# Patient Record
Sex: FEMALE | Race: WHITE | Employment: OTHER | URBAN - METROPOLITAN AREA
[De-identification: names, ages, dates, MRNs, and addresses within clinical notes are randomized per-mention and may not be internally consistent; named-entity substitution may affect disease eponyms.]

---

## 2017-01-17 ENCOUNTER — TELEPHONE (OUTPATIENT)
Dept: HEMATOLOGY/ONCOLOGY | Facility: HOSPITAL | Age: 72
End: 2017-01-17

## 2017-01-31 ENCOUNTER — OFFICE VISIT (OUTPATIENT)
Dept: FAMILY MEDICINE CLINIC | Facility: CLINIC | Age: 72
End: 2017-01-31

## 2017-01-31 VITALS — DIASTOLIC BLOOD PRESSURE: 80 MMHG | SYSTOLIC BLOOD PRESSURE: 130 MMHG

## 2017-01-31 DIAGNOSIS — E78.00 HYPERCHOLESTEREMIA: Primary | ICD-10-CM

## 2017-01-31 DIAGNOSIS — I10 ESSENTIAL HYPERTENSION: ICD-10-CM

## 2017-01-31 PROCEDURE — 99213 OFFICE O/P EST LOW 20 MIN: CPT | Performed by: FAMILY MEDICINE

## 2017-01-31 RX ORDER — ATORVASTATIN CALCIUM 20 MG/1
40 TABLET, FILM COATED ORAL NIGHTLY
Qty: 90 TABLET | Refills: 3 | Status: SHIPPED | OUTPATIENT
Start: 2017-01-31 | End: 2017-04-18

## 2017-01-31 NOTE — PROGRESS NOTES
[facility] Office Note  Chief Complaint:   Patient presents with:  Blood Pressure      HPI:   This is a 66year old female coming in for f\u cholesterol and blood pressure      Results for orders placed or performed dur cm.  The cores   are inked black and submitted in toto in cassettes A1 and A2. Jar A2 is    labeled \"left breast without calcifications, 2-3 o'clock\".   Specimen   consists of multiple pieces of yellow to white-tan fibrofatty needle core   biopsies of br BIOPSY STEREOTACTIC NODULE 1 SITE RIGHT  3/2015    Comment benign     Social History:    Smoking Status: Former Smoker                   Packs/Day: 1.00  Years: 21        Quit date: 10/10/2014    Alcohol Use: No              Family History:  Family History conjunctivae clear bilaterally, no eye discharge Ears: External normal. Nose: patent, no nasal discharge Throat:  No tonsillar erythema or exudate. Mouth:  No oral lesions or ulcerations, good dentition. NECK: Supple, no CLAD, no JVD, no thyromegaly.   [facility] Office Note  Chief Complaint:   Patient presents with:  Blood Pressure      HPI:   This is a 66year old female coming in for      Results for orders placed or performed during the hospital encounter of 11/08/16  -CHANDNI submitted in toto in cassettes A1 and A2. Jar A2 is    labeled \"left breast without calcifications, 2-3 o'clock\". Specimen   consists of multiple pieces of yellow to white-tan fibrofatty needle core   biopsies of breast tissue measuring in aggregate 2. 3/2015    Comment benign     Social History:    Smoking Status: Former Smoker                   Packs/Day: 1.00  Years: 21        Quit date: 10/10/2014    Alcohol Use: No              Family History:  Family History   Problem Relation Age of Onset   • Elisabeth Ears: External normal. Nose: patent, no nasal discharge Throat:  No tonsillar erythema or exudate. Mouth:  No oral lesions or ulcerations, good dentition. NECK: Supple, no CLAD, no JVD, no thyromegaly.   SKIN: No rashes, no skin lesion, no bruising, good

## 2017-02-02 ENCOUNTER — TELEPHONE (OUTPATIENT)
Dept: FAMILY MEDICINE CLINIC | Facility: CLINIC | Age: 72
End: 2017-02-02

## 2017-02-02 RX ORDER — ATORVASTATIN CALCIUM 40 MG/1
40 TABLET, FILM COATED ORAL NIGHTLY
Qty: 90 TABLET | Refills: 2 | Status: ON HOLD | OUTPATIENT
Start: 2017-02-02 | End: 2018-01-07

## 2017-02-14 ENCOUNTER — HOSPITAL ENCOUNTER (OUTPATIENT)
Age: 72
Discharge: HOME | End: 2017-02-14
Attending: FAMILY MEDICINE
Payer: MEDICARE

## 2017-02-14 VITALS
WEIGHT: 215 LBS | DIASTOLIC BLOOD PRESSURE: 91 MMHG | BODY MASS INDEX: 32.58 KG/M2 | HEART RATE: 78 BPM | HEIGHT: 68 IN | SYSTOLIC BLOOD PRESSURE: 129 MMHG | OXYGEN SATURATION: 98 % | RESPIRATION RATE: 18 BRPM | TEMPERATURE: 98 F

## 2017-02-14 DIAGNOSIS — J06.9 UPPER RESPIRATORY TRACT INFECTION, UNSPECIFIED TYPE: Primary | ICD-10-CM

## 2017-02-14 PROCEDURE — 99214 OFFICE O/P EST MOD 30 MIN: CPT

## 2017-02-14 PROCEDURE — 99213 OFFICE O/P EST LOW 20 MIN: CPT

## 2017-02-14 RX ORDER — BENZONATATE 200 MG/1
200 CAPSULE ORAL 3 TIMES DAILY PRN
Qty: 30 CAPSULE | Refills: 0 | Status: SHIPPED | OUTPATIENT
Start: 2017-02-14 | End: 2017-02-24

## 2017-02-14 RX ORDER — FLUTICASONE PROPIONATE 50 MCG
SPRAY, SUSPENSION (ML) NASAL
Qty: 1 INHALER | Refills: 0 | Status: SHIPPED | OUTPATIENT
Start: 2017-02-14 | End: 2017-04-18

## 2017-02-14 NOTE — ED PROVIDER NOTES
Patient Seen in: [facility]    History   Patient presents with:  Sinus Problem    Stated Complaint: SINUS ISSUES / Shira NOYOLA    This 13-year-old female presents to the office with complaint of cough which is productive of scarlet CHEMOTHERAPY  2014    LUMPECTOMY LEFT  5/2014    Comment IDC -done at [address] [place] [address]  2014    MARIAM BIOPSY STEREOTACTIC NODULE 1 SITE RIGHT  3/2015    Comment benign       Medications :   Fluticasone Propionate 50 MCG/ACT Nasal Eri HPI.    Physical Exam     ED Triage Vitals   BP 02/14/17 0805 129/91 mmHg   Pulse 02/14/17 0805 78   Resp 02/14/17 0805 18   Temp 02/14/17 0805 98.3 °F (36.8 °C)   Temp src 02/14/17 0805 Temporal   SpO2 02/14/17 0805 98 %   O2 Device 02/14/17 0805 None (Ro Discharge Medication List    START taking these medications    Fluticasone Propionate 50 MCG/ACT Nasal Suspension  Use 2 sprays each nostril once daily after shower. Stop if you develop nose bleeds.   Qty: 1 Inhaler Refills: 0  Associated Diagnoses:Upper re

## 2017-02-14 NOTE — ED INITIAL ASSESSMENT (HPI)
Pt began with a cough 1-2 days ago, she has congestion and has clear mucus from her nose, she also has a headache

## 2017-02-23 ENCOUNTER — TELEPHONE (OUTPATIENT)
Dept: FAMILY MEDICINE CLINIC | Facility: CLINIC | Age: 72
End: 2017-02-23

## 2017-02-27 ENCOUNTER — TELEPHONE (OUTPATIENT)
Dept: HEMATOLOGY/ONCOLOGY | Facility: HOSPITAL | Age: 72
End: 2017-02-27

## 2017-04-18 ENCOUNTER — OFFICE VISIT (OUTPATIENT)
Dept: HEMATOLOGY/ONCOLOGY | Facility: HOSPITAL | Age: 72
End: 2017-04-18
Attending: INTERNAL MEDICINE
Payer: MEDICARE

## 2017-04-18 VITALS
SYSTOLIC BLOOD PRESSURE: 143 MMHG | TEMPERATURE: 99 F | HEART RATE: 94 BPM | OXYGEN SATURATION: 94 % | RESPIRATION RATE: 18 BRPM | DIASTOLIC BLOOD PRESSURE: 82 MMHG

## 2017-04-18 DIAGNOSIS — C50.412 MALIGNANT NEOPLASM OF UPPER-OUTER QUADRANT OF LEFT FEMALE BREAST (HCC): ICD-10-CM

## 2017-04-18 DIAGNOSIS — Z85.3 HISTORY OF BREAST CANCER: Primary | ICD-10-CM

## 2017-04-18 PROCEDURE — 99213 OFFICE O/P EST LOW 20 MIN: CPT | Performed by: INTERNAL MEDICINE

## 2017-04-18 NOTE — PROGRESS NOTES
Pt here for 6 month MD f/u for hx of LB ca. Continues on Tamoxifen daily- c/o ongoing hot flashes that are manageable. Pt has no further complaints at this time.      Education Record    Learner:  Patient    Disease / Diagnosis:    Barriers / Limitations:

## 2017-05-02 NOTE — PROGRESS NOTES
[facility]    PATIENT'S NAME: Tomi [place]   ATTENDING PHYSICIAN: Justina Kiser M.D.    PATIENT ACCOUNT #: [de-identified] LOCATION: [address] [place] [address] RECORD #: [id number] YOB: 1945   DATE OF SERVICE: 04/18/2017       CAN GENERAL:  She is a well-developed, obese female in no acute distress. VITAL SIGNS:  Her performance status is 0. Her weight was not measured. Blood pressure 143/82, pulse 94, respiratory rate 20, temperature 98. 6. HEENT:  Unremarkable.   She has pink

## 2017-08-11 ENCOUNTER — TELEPHONE (OUTPATIENT)
Dept: FAMILY MEDICINE CLINIC | Facility: CLINIC | Age: 72
End: 2017-08-11

## 2017-08-11 NOTE — TELEPHONE ENCOUNTER
Patient states that she stopped her Isosorbide mononitrate due to extreme fatigue and sob, she has not had it in 5 days and feels a lot better. I advised her that I would let Dr. Yg Dunbar know and if he wanted to something different I would contact her.  She s

## 2017-09-25 RX ORDER — METOPROLOL SUCCINATE 50 MG/1
TABLET, EXTENDED RELEASE ORAL
Qty: 30 TABLET | Refills: 0 | Status: SHIPPED | OUTPATIENT
Start: 2017-09-25 | End: 2017-10-27

## 2017-09-25 RX ORDER — METOPROLOL SUCCINATE 50 MG/1
TABLET, EXTENDED RELEASE ORAL
Qty: 30 TABLET | Refills: 0 | Status: SHIPPED | OUTPATIENT
Start: 2017-09-25 | End: 2017-09-25

## 2017-09-25 RX ORDER — DULOXETIN HYDROCHLORIDE 60 MG/1
CAPSULE, DELAYED RELEASE ORAL
Qty: 30 CAPSULE | Refills: 0 | Status: SHIPPED | OUTPATIENT
Start: 2017-09-25 | End: 2017-09-25

## 2017-09-25 RX ORDER — ISOSORBIDE MONONITRATE 30 MG/1
TABLET, EXTENDED RELEASE ORAL
Qty: 30 TABLET | Refills: 0 | Status: SHIPPED | OUTPATIENT
Start: 2017-09-25 | End: 2017-09-25

## 2017-09-25 RX ORDER — ISOSORBIDE MONONITRATE 30 MG/1
TABLET, EXTENDED RELEASE ORAL
Qty: 30 TABLET | Refills: 0 | Status: SHIPPED | OUTPATIENT
Start: 2017-09-25 | End: 2017-11-10

## 2017-09-25 RX ORDER — DULOXETIN HYDROCHLORIDE 60 MG/1
CAPSULE, DELAYED RELEASE ORAL
Qty: 30 CAPSULE | Refills: 0 | Status: SHIPPED | OUTPATIENT
Start: 2017-09-25 | End: 2017-10-27

## 2017-10-03 ENCOUNTER — TELEPHONE (OUTPATIENT)
Dept: HEMATOLOGY/ONCOLOGY | Facility: HOSPITAL | Age: 72
End: 2017-10-03

## 2017-10-13 RX ORDER — CELECOXIB 200 MG/1
200 CAPSULE ORAL DAILY
Qty: 30 CAPSULE | Refills: 5 | Status: ON HOLD | OUTPATIENT
Start: 2017-10-13 | End: 2018-01-07

## 2017-10-27 ENCOUNTER — TELEPHONE (OUTPATIENT)
Dept: FAMILY MEDICINE CLINIC | Facility: CLINIC | Age: 72
End: 2017-10-27

## 2017-10-27 RX ORDER — DULOXETIN HYDROCHLORIDE 60 MG/1
CAPSULE, DELAYED RELEASE ORAL
Qty: 90 CAPSULE | Refills: 0 | OUTPATIENT
Start: 2017-10-27

## 2017-10-27 RX ORDER — METOPROLOL SUCCINATE 50 MG/1
TABLET, EXTENDED RELEASE ORAL
Qty: 90 TABLET | Refills: 0 | OUTPATIENT
Start: 2017-10-27

## 2017-10-27 RX ORDER — DULOXETIN HYDROCHLORIDE 60 MG/1
CAPSULE, DELAYED RELEASE ORAL
Qty: 30 CAPSULE | Refills: 0 | Status: SHIPPED | OUTPATIENT
Start: 2017-10-27 | End: 2017-11-17

## 2017-10-27 RX ORDER — METOPROLOL SUCCINATE 50 MG/1
TABLET, EXTENDED RELEASE ORAL
Qty: 30 TABLET | Refills: 0 | Status: SHIPPED | OUTPATIENT
Start: 2017-10-27 | End: 2017-11-10 | Stop reason: ALTCHOICE

## 2017-10-27 NOTE — TELEPHONE ENCOUNTER
Patient is calling to get a refill on two of her medications. She has an appointment scheduled for 11/10.  She needs her metroprolol and duloxetine refill until her appointment time

## 2017-10-30 RX ORDER — DULOXETIN HYDROCHLORIDE 60 MG/1
CAPSULE, DELAYED RELEASE ORAL
Qty: 30 CAPSULE | Refills: 0 | Status: SHIPPED | OUTPATIENT
Start: 2017-10-30 | End: 2018-01-05

## 2017-11-09 ENCOUNTER — HOSPITAL ENCOUNTER (OUTPATIENT)
Dept: MAMMOGRAPHY | Facility: HOSPITAL | Age: 72
Discharge: HOME | End: 2017-11-09
Attending: INTERNAL MEDICINE
Payer: MEDICARE

## 2017-11-09 DIAGNOSIS — Z85.3 HISTORY OF BREAST CANCER: ICD-10-CM

## 2017-11-09 PROCEDURE — 77066 DX MAMMO INCL CAD BI: CPT | Performed by: INTERNAL MEDICINE

## 2017-11-09 PROCEDURE — 77062 BREAST TOMOSYNTHESIS BI: CPT | Performed by: INTERNAL MEDICINE

## 2017-11-10 ENCOUNTER — OFFICE VISIT (OUTPATIENT)
Dept: FAMILY MEDICINE CLINIC | Facility: CLINIC | Age: 72
End: 2017-11-10

## 2017-11-10 VITALS
HEART RATE: 88 BPM | SYSTOLIC BLOOD PRESSURE: 150 MMHG | OXYGEN SATURATION: 94 % | BODY MASS INDEX: 39.87 KG/M2 | HEIGHT: 67.5 IN | DIASTOLIC BLOOD PRESSURE: 100 MMHG | WEIGHT: 257 LBS

## 2017-11-10 DIAGNOSIS — I10 ESSENTIAL HYPERTENSION: Primary | ICD-10-CM

## 2017-11-10 DIAGNOSIS — E66.09 OBESITY DUE TO EXCESS CALORIES, UNSPECIFIED CLASSIFICATION, UNSPECIFIED WHETHER SERIOUS COMORBIDITY PRESENT: ICD-10-CM

## 2017-11-10 DIAGNOSIS — Z00.00 ENCOUNTER FOR MEDICARE ANNUAL WELLNESS EXAM: ICD-10-CM

## 2017-11-10 DIAGNOSIS — C50.919 MALIGNANT NEOPLASM OF FEMALE BREAST, UNSPECIFIED ESTROGEN RECEPTOR STATUS, UNSPECIFIED LATERALITY, UNSPECIFIED SITE OF BREAST (HCC): ICD-10-CM

## 2017-11-10 DIAGNOSIS — Z13.6 SCREENING FOR CARDIOVASCULAR CONDITION: ICD-10-CM

## 2017-11-10 DIAGNOSIS — E78.00 HYPERCHOLESTEREMIA: ICD-10-CM

## 2017-11-10 DIAGNOSIS — F41.9 ANXIETY: ICD-10-CM

## 2017-11-10 PROCEDURE — G0439 PPPS, SUBSEQ VISIT: HCPCS | Performed by: FAMILY MEDICINE

## 2017-11-10 RX ORDER — ALPRAZOLAM 0.25 MG/1
0.25 TABLET ORAL 3 TIMES DAILY PRN
Qty: 60 TABLET | Refills: 1 | Status: SHIPPED | OUTPATIENT
Start: 2017-11-10 | End: 2019-04-05 | Stop reason: ALTCHOICE

## 2017-11-10 RX ORDER — AMLODIPINE BESYLATE AND BENAZEPRIL HYDROCHLORIDE 5; 20 MG/1; MG/1
1 CAPSULE ORAL DAILY
Qty: 90 CAPSULE | Refills: 3 | Status: SHIPPED | OUTPATIENT
Start: 2017-11-10 | End: 2017-11-17

## 2017-11-10 NOTE — PROGRESS NOTES
[facility] Office Note  Chief Complaint:   Patient presents with:   Well Adult  HTN  Shortness Of Breath      HPI:   This is a 74year old female coming in for well exam.      Results for orders placed or performed imelda cm.  The cores   are inked black and submitted in toto in cassettes A1 and A2. Jar A2 is    labeled \"left breast without calcifications, 2-3 o'clock\".   Specimen   consists of multiple pieces of yellow to white-tan fibrofatty needle core   biopsies of br Comment: Procedure: ESOPHAGOGASTRODUODENOSCOPY,                COLONOSCOPY, POSSIBLE BIOPSY, POSSIBLE                POLYPECTOMY [id number];  Surgeon:  Maggy Flores MD;  Location: [facility] throat. INTEGUMENTARY:  Denies rashes, itching, skin lesion, or excessive skin dryness.   CARDIOVASCULAR:  Denies chest pain, chest pressure, chest discomfort, palpitations, edema, dyspnea on exertion or at rest.  RESPIRATORY:  Denies shortness of breath, tenderness. ABDOMEN:  Soft, nondistended, nontender, bowel sounds normal in all 4 quadrants, no masses, no hepatosplenomegaly. BACK: No tenderness, no spasm, SLR test negative, FROM.   EXTREMITIES:  No edema, no cyanosis, no clubbing, FROM, 2+ dorsalis pe to call with any questions, complications, allergies, or worsening or changing symptoms. Patient is to call with any side effects or complications from the treatments as a result of today.      Problem List:  Patient Active Problem List:     Breast cancer needed for Sleep.    DULOXETINE HCL 60 MG Oral Cap DR Particles TAKE 1 CAPSULE(60 MG) BY MOUTH EVERY DAY   DULoxetine HCl 60 MG Oral Cap DR Particles TAKE 1 CAPSULE(60 MG) BY MOUTH EVERY DAY   celecoxib 200 MG Oral Cap Take 1 capsule (200 mg total) by mouth denies heartburn  : denies dysuria, vaginal discharge or itching, no complaint of urinary incontinence   MUSCULOSKELETAL: denies back pain  NEURO: denies headaches  PSYCHE: denies depression or anxiety  HEMATOLOGIC: denies hx of anemia  ENDOCRINE: denies Skin: Skin color, texture, turgor normal, no rashes or lesions   Lymph nodes: Cervical, supraclavicular, and axillary nodes normal   Neurologic: Normal         SUGGESTED VACCINATIONS - Influenza, Pneumococcal, Zoster, Tetanus     There is no immunization activity?: Light    How would you describe your current health state?: Good    How do you maintain positive mental well-being?: Social Interaction;Puzzles;Games; Visiting Friends; Visiting Family    If you are a male age 38-65 or a female age 47-67, do you t patient  PREVENTATIVE SERVICES  INDICATIONS AND SCHEDULE Internal Lab or Procedure External Lab or Procedure   Diabetes Screening      HbgA1C   Annually   Lab Results  Component Value Date   A1C 5.3 09/09/2016    No flowsheet data found.     Fasting Blood S Pneumococcal 13 (Prevnar)  Covered Once after 65 No vaccine history found Please get once after your 65th birthday    Pneumococcal 23 (Pneumovax)  Covered Once after 65 No vaccine history found Please get once after your 65th birthday    Hepatitis B for (L) (%)   Date Value   09/09/2016 5.3    No flowsheet data found.     Creat/alb ratio  Annually      LDL  Annually LDL Cholesterol Calc (mg/dL)   Date Value   07/18/2006 67     LDL Cholesterol (mg/dL)   Date Value   09/09/2016 146 (H)    No flowsheet data f

## 2017-11-13 NOTE — PATIENT INSTRUCTIONS
[address] SCREENING SCHEDULE   Tests on this list are recommended by your physician but may not be covered, or covered at this frequency, by your insurer. Please check with your insurance carrier before scheduling to verify coverage.    [place] for this or any previous visit.  Limited to patients who meet one of the following criteria:   • Men who are 73-68 years old and have smoked more than 100 cigarettes in their lifetime   • Anyone with a family history    Colorectal Cancer Screening  Covered regularly   Immunizations      Influenza  Covered Annually No orders found for this or any previous visit. Please get every year    Pneumococcal 13 (Prevnar)  Covered Once after 65 No orders found for this or any previous visit.  Please get once after your

## 2017-11-17 ENCOUNTER — OFFICE VISIT (OUTPATIENT)
Dept: HEMATOLOGY/ONCOLOGY | Facility: HOSPITAL | Age: 72
End: 2017-11-17
Attending: INTERNAL MEDICINE
Payer: MEDICARE

## 2017-11-17 VITALS
HEIGHT: 66.54 IN | TEMPERATURE: 97 F | RESPIRATION RATE: 18 BRPM | SYSTOLIC BLOOD PRESSURE: 132 MMHG | HEART RATE: 68 BPM | DIASTOLIC BLOOD PRESSURE: 85 MMHG | OXYGEN SATURATION: 100 %

## 2017-11-17 DIAGNOSIS — Z17.0 MALIGNANT NEOPLASM OF UPPER-OUTER QUADRANT OF LEFT BREAST IN FEMALE, ESTROGEN RECEPTOR POSITIVE (HCC): Primary | ICD-10-CM

## 2017-11-17 DIAGNOSIS — C50.412 MALIGNANT NEOPLASM OF UPPER-OUTER QUADRANT OF LEFT BREAST IN FEMALE, ESTROGEN RECEPTOR POSITIVE (HCC): Primary | ICD-10-CM

## 2017-11-17 PROCEDURE — 99213 OFFICE O/P EST LOW 20 MIN: CPT | Performed by: INTERNAL MEDICINE

## 2017-11-17 RX ORDER — AMLODIPINE BESYLATE 5 MG/1
5 TABLET ORAL DAILY
Refills: 3 | COMMUNITY
Start: 2017-11-10 | End: 2018-05-10

## 2017-11-17 RX ORDER — BENAZEPRIL HYDROCHLORIDE 20 MG/1
20 TABLET ORAL DAILY
Refills: 3 | COMMUNITY
Start: 2017-11-10 | End: 2018-05-10

## 2017-11-17 NOTE — PROGRESS NOTES
Patient is here for MD f/u for breast cancer. On Tamoxifen. Reports occasional hot flashes and night sweats at night. Appetite and energy level is good. Mild dizziness this week d/t blood pressure med changes. Pt had a mammogram one week ago.            Edu

## 2017-11-20 NOTE — PROGRESS NOTES
[facility]    PATIENT'S NAME: Reinaldo Swanson   ATTENDING PHYSICIAN: Elsa Johnson M.D.    PATIENT ACCOUNT #: [de-identified] LOCATION: [address] [place] [address] RECORD #: [id number] YOB: 1945   DATE OF SERVICE: 11/17/2017       CAN conjunctivae, anicteric sclerae. Pharynx without lesions. LYMPHATICS:  She has no cervical, supraclavicular, or axillary adenopathy. LUNGS:  Resonant to percussion and clear to auscultation with no wheezing, rales, or rhonchi.   HEART:  Regular S1 and S2

## 2017-12-11 ENCOUNTER — TELEPHONE (OUTPATIENT)
Dept: FAMILY MEDICINE CLINIC | Facility: CLINIC | Age: 72
End: 2017-12-11

## 2017-12-11 DIAGNOSIS — R06.02 SHORTNESS OF BREATH: Primary | ICD-10-CM

## 2017-12-11 NOTE — TELEPHONE ENCOUNTER
P/Dr. Gilles Corrales order a chest xray and PFT's. Orders are in the system and she can call and schedule for when she can.

## 2017-12-12 ENCOUNTER — TELEPHONE (OUTPATIENT)
Dept: FAMILY MEDICINE CLINIC | Facility: CLINIC | Age: 72
End: 2017-12-12

## 2017-12-12 ENCOUNTER — HOSPITAL ENCOUNTER (OUTPATIENT)
Dept: GENERAL RADIOLOGY | Facility: HOSPITAL | Age: 72
Discharge: HOME | End: 2017-12-12
Attending: FAMILY MEDICINE
Payer: MEDICARE

## 2017-12-12 DIAGNOSIS — R06.02 SHORTNESS OF BREATH: ICD-10-CM

## 2017-12-12 PROCEDURE — 71020 XR CHEST PA + LAT CHEST (CPT=71020): CPT | Performed by: FAMILY MEDICINE

## 2017-12-13 ENCOUNTER — NURSE ONLY (OUTPATIENT)
Dept: RESPIRATORY THERAPY | Facility: HOSPITAL | Age: 72
End: 2017-12-13
Attending: FAMILY MEDICINE
Payer: MEDICARE

## 2017-12-13 DIAGNOSIS — R06.02 SHORTNESS OF BREATH: ICD-10-CM

## 2017-12-13 PROCEDURE — 94010 BREATHING CAPACITY TEST: CPT

## 2017-12-13 PROCEDURE — 94726 PLETHYSMOGRAPHY LUNG VOLUMES: CPT

## 2017-12-13 PROCEDURE — 94729 DIFFUSING CAPACITY: CPT

## 2017-12-13 NOTE — TELEPHONE ENCOUNTER
Advised patient that there is a hiatal hernia, she states that she was aware of the that and has no issues with it. Will be having PFT test tomorrow.

## 2017-12-13 NOTE — PROCEDURES
Spirometry and flow volume loop appear normal with no evidence of airway obstruction     Spirometry and flow volume loop suggest restriction but the total lung capacity is within normal limits.   Normal TLC, no evidence of restriction    The diffusing capac

## 2017-12-18 ENCOUNTER — TELEPHONE (OUTPATIENT)
Dept: FAMILY MEDICINE CLINIC | Facility: CLINIC | Age: 72
End: 2017-12-18

## 2017-12-18 DIAGNOSIS — R06.02 SHORTNESS OF BREATH: Primary | ICD-10-CM

## 2017-12-18 NOTE — TELEPHONE ENCOUNTER
Patient informed that she needed to see a pulmonologist, she will look for one at go and will go get bw done as well.

## 2018-01-02 ENCOUNTER — TELEPHONE (OUTPATIENT)
Dept: FAMILY MEDICINE CLINIC | Facility: CLINIC | Age: 73
End: 2018-01-02

## 2018-01-02 ENCOUNTER — LAB ENCOUNTER (OUTPATIENT)
Dept: LAB | Facility: HOSPITAL | Age: 73
End: 2018-01-02
Attending: FAMILY MEDICINE
Payer: MEDICARE

## 2018-01-02 DIAGNOSIS — I10 ESSENTIAL HYPERTENSION: ICD-10-CM

## 2018-01-02 DIAGNOSIS — E78.00 HYPERCHOLESTEREMIA: ICD-10-CM

## 2018-01-02 LAB
ALBUMIN SERPL-MCNC: 3.2 G/DL (ref 3.5–4.8)
ALP LIVER SERPL-CCNC: 64 U/L (ref 55–142)
ALT SERPL-CCNC: 16 U/L (ref 14–54)
AST SERPL-CCNC: 16 U/L (ref 15–41)
BASOPHILS # BLD AUTO: 0.13 X10(3) UL (ref 0–0.1)
BASOPHILS NFR BLD AUTO: 1.2 %
BILIRUB SERPL-MCNC: 0.3 MG/DL (ref 0.1–2)
BUN BLD-MCNC: 17 MG/DL (ref 8–20)
CALCIUM BLD-MCNC: 8.9 MG/DL (ref 8.3–10.3)
CHLORIDE: 110 MMOL/L (ref 101–111)
CHOLEST SMN-MCNC: 243 MG/DL (ref ?–200)
CO2: 21 MMOL/L (ref 22–32)
CREAT BLD-MCNC: 1.05 MG/DL (ref 0.55–1.02)
EOSINOPHIL # BLD AUTO: 0.46 X10(3) UL (ref 0–0.3)
EOSINOPHIL NFR BLD AUTO: 4.3 %
ERYTHROCYTE [DISTWIDTH] IN BLOOD BY AUTOMATED COUNT: 16.1 % (ref 11.5–16)
GLUCOSE BLD-MCNC: 128 MG/DL (ref 70–99)
HCT VFR BLD AUTO: 30.2 % (ref 34–50)
HDLC SERPL-MCNC: 48 MG/DL (ref 45–?)
HDLC SERPL: 5.06 {RATIO} (ref ?–4.44)
HGB BLD-MCNC: 9 G/DL (ref 12–16)
IMMATURE GRANULOCYTE COUNT: 0.03 X10(3) UL (ref 0–1)
IMMATURE GRANULOCYTE RATIO %: 0.3 %
LDLC SERPL CALC-MCNC: 134 MG/DL (ref ?–130)
LYMPHOCYTES # BLD AUTO: 3.71 X10(3) UL (ref 0.9–4)
LYMPHOCYTES NFR BLD AUTO: 34.8 %
M PROTEIN MFR SERPL ELPH: 7.1 G/DL (ref 6.1–8.3)
MCH RBC QN AUTO: 24.6 PG (ref 27–33.2)
MCHC RBC AUTO-ENTMCNC: 29.8 G/DL (ref 31–37)
MCV RBC AUTO: 82.5 FL (ref 81–100)
MONOCYTES # BLD AUTO: 0.74 X10(3) UL (ref 0.1–0.6)
MONOCYTES NFR BLD AUTO: 6.9 %
NEUTROPHIL ABS PRELIM: 5.58 X10 (3) UL (ref 1.3–6.7)
NEUTROPHILS # BLD AUTO: 5.58 X10(3) UL (ref 1.3–6.7)
NEUTROPHILS NFR BLD AUTO: 52.5 %
NONHDLC SERPL-MCNC: 195 MG/DL (ref ?–130)
PLATELET # BLD AUTO: 385 10(3)UL (ref 150–450)
POTASSIUM SERPL-SCNC: 3.8 MMOL/L (ref 3.6–5.1)
RBC # BLD AUTO: 3.66 X10(6)UL (ref 3.8–5.1)
RED CELL DISTRIBUTION WIDTH-SD: 48.7 FL (ref 35.1–46.3)
SODIUM SERPL-SCNC: 142 MMOL/L (ref 136–144)
TRIGL SERPL-MCNC: 304 MG/DL (ref ?–150)
VLDLC SERPL CALC-MCNC: 61 MG/DL (ref 5–40)
WBC # BLD AUTO: 10.7 X10(3) UL (ref 4–13)

## 2018-01-02 PROCEDURE — 80053 COMPREHEN METABOLIC PANEL: CPT

## 2018-01-02 PROCEDURE — 36415 COLL VENOUS BLD VENIPUNCTURE: CPT

## 2018-01-02 PROCEDURE — 85025 COMPLETE CBC W/AUTO DIFF WBC: CPT

## 2018-01-02 PROCEDURE — 80061 LIPID PANEL: CPT

## 2018-01-05 ENCOUNTER — TELEPHONE (OUTPATIENT)
Dept: FAMILY MEDICINE CLINIC | Facility: CLINIC | Age: 73
End: 2018-01-05

## 2018-01-05 RX ORDER — DULOXETIN HYDROCHLORIDE 60 MG/1
CAPSULE, DELAYED RELEASE ORAL
Qty: 30 CAPSULE | Refills: 0 | Status: SHIPPED | OUTPATIENT
Start: 2018-01-05 | End: 2018-01-18

## 2018-01-05 RX ORDER — TAMOXIFEN CITRATE 20 MG/1
TABLET ORAL
Qty: 90 TABLET | Refills: 0 | OUTPATIENT
Start: 2018-01-05

## 2018-01-05 RX ORDER — TAMOXIFEN CITRATE 20 MG/1
TABLET ORAL
Qty: 30 TABLET | Refills: 0 | Status: SHIPPED | OUTPATIENT
Start: 2018-01-05 | End: 2018-02-02

## 2018-01-06 NOTE — TELEPHONE ENCOUNTER
Per Dr. Arti Mobley, to have patient come in and discuss results. Patient would only like to go to [address] [place] [address] an appointment for Friday the 12th, and patient declined, states she doesn't want to wait that long.   She would like to be seen on Tuesday [facility]

## 2018-01-07 ENCOUNTER — APPOINTMENT (OUTPATIENT)
Dept: GENERAL RADIOLOGY | Facility: HOSPITAL | Age: 73
DRG: 378 | End: 2018-01-07
Attending: EMERGENCY MEDICINE
Payer: MEDICARE

## 2018-01-07 ENCOUNTER — HOSPITAL ENCOUNTER (INPATIENT)
Facility: HOSPITAL | Age: 73
LOS: 3 days | Discharge: HOME | DRG: 378 | End: 2018-01-10
Attending: EMERGENCY MEDICINE | Admitting: INTERNAL MEDICINE
Payer: MEDICARE

## 2018-01-07 DIAGNOSIS — D50.8 IRON DEFICIENCY ANEMIA SECONDARY TO INADEQUATE DIETARY IRON INTAKE: ICD-10-CM

## 2018-01-07 DIAGNOSIS — D64.9 SYMPTOMATIC ANEMIA: ICD-10-CM

## 2018-01-07 DIAGNOSIS — K92.2 GASTROINTESTINAL HEMORRHAGE, UNSPECIFIED GASTROINTESTINAL HEMORRHAGE TYPE: Primary | ICD-10-CM

## 2018-01-07 DIAGNOSIS — R06.00 DOE (DYSPNEA ON EXERTION): ICD-10-CM

## 2018-01-07 PROBLEM — R06.09 DOE (DYSPNEA ON EXERTION): Status: ACTIVE | Noted: 2018-01-07

## 2018-01-07 LAB
ALBUMIN SERPL-MCNC: 3.2 G/DL (ref 3.5–4.8)
ALP LIVER SERPL-CCNC: 66 U/L (ref 55–142)
ALT SERPL-CCNC: 17 U/L (ref 14–54)
ANTIBODY SCREEN: NEGATIVE
APTT PPP: 27.5 SECONDS (ref 25–34)
AST SERPL-CCNC: 15 U/L (ref 15–41)
BASOPHILS # BLD AUTO: 0.04 X10(3) UL (ref 0–0.1)
BASOPHILS NFR BLD AUTO: 0.5 %
BILIRUB SERPL-MCNC: 0.3 MG/DL (ref 0.1–2)
BUN BLD-MCNC: 22 MG/DL (ref 8–20)
CALCIUM BLD-MCNC: 8.8 MG/DL (ref 8.3–10.3)
CHLORIDE: 108 MMOL/L (ref 101–111)
CO2: 24 MMOL/L (ref 22–32)
CREAT BLD-MCNC: 1.04 MG/DL (ref 0.55–1.02)
D-DIMER: 0.4 UG/ML FEU (ref 0–0.49)
DEPRECATED HBV CORE AB SER IA-ACNC: 5.9 NG/ML (ref 10–291)
EOSINOPHIL # BLD AUTO: 0.19 X10(3) UL (ref 0–0.3)
EOSINOPHIL NFR BLD AUTO: 2.2 %
ERYTHROCYTE [DISTWIDTH] IN BLOOD BY AUTOMATED COUNT: 16 % (ref 11.5–16)
GLUCOSE BLD-MCNC: 97 MG/DL (ref 70–99)
HCT VFR BLD AUTO: 28.2 % (ref 34–50)
HGB BLD-MCNC: 8.4 G/DL (ref 12–16)
IMMATURE GRANULOCYTE COUNT: 0.04 X10(3) UL (ref 0–1)
IMMATURE GRANULOCYTE RATIO %: 0.5 %
INR BLD: 1.36 (ref 0.89–1.11)
IRON SATURATION: 3 % (ref 13–45)
IRON: 16 UG/DL (ref 28–170)
LYMPHOCYTES # BLD AUTO: 1.4 X10(3) UL (ref 0.9–4)
LYMPHOCYTES NFR BLD AUTO: 15.9 %
M PROTEIN MFR SERPL ELPH: 6.9 G/DL (ref 6.1–8.3)
MCH RBC QN AUTO: 24.1 PG (ref 27–33.2)
MCHC RBC AUTO-ENTMCNC: 29.8 G/DL (ref 31–37)
MCV RBC AUTO: 81 FL (ref 81–100)
MONOCYTES # BLD AUTO: 0.73 X10(3) UL (ref 0.1–0.6)
MONOCYTES NFR BLD AUTO: 8.3 %
NEUTROPHIL ABS PRELIM: 6.41 X10 (3) UL (ref 1.3–6.7)
NEUTROPHILS # BLD AUTO: 6.41 X10(3) UL (ref 1.3–6.7)
NEUTROPHILS NFR BLD AUTO: 72.6 %
PLATELET # BLD AUTO: 291 10(3)UL (ref 150–450)
POTASSIUM SERPL-SCNC: 4.4 MMOL/L (ref 3.6–5.1)
PRO-BETA NATRIURETIC PEPTIDE: 39 PG/ML (ref ?–125)
PSA SERPL DL<=0.01 NG/ML-MCNC: 16.9 SECONDS (ref 12–14.3)
RBC # BLD AUTO: 3.48 X10(6)UL (ref 3.8–5.1)
RED CELL DISTRIBUTION WIDTH-SD: 47.2 FL (ref 35.1–46.3)
RH BLOOD TYPE: NEGATIVE
SODIUM SERPL-SCNC: 141 MMOL/L (ref 136–144)
TOTAL IRON BINDING CAPACITY: 536 UG/DL (ref 298–536)
TRANSFERRIN: 360 MG/DL (ref 200–360)
TROPONIN: <0.046 NG/ML (ref ?–0.05)
WBC # BLD AUTO: 8.8 X10(3) UL (ref 4–13)

## 2018-01-07 PROCEDURE — 99223 1ST HOSP IP/OBS HIGH 75: CPT | Performed by: INTERNAL MEDICINE

## 2018-01-07 PROCEDURE — 71045 X-RAY EXAM CHEST 1 VIEW: CPT | Performed by: EMERGENCY MEDICINE

## 2018-01-07 RX ORDER — ONDANSETRON 2 MG/ML
4 INJECTION INTRAMUSCULAR; INTRAVENOUS EVERY 6 HOURS PRN
Status: DISCONTINUED | OUTPATIENT
Start: 2018-01-07 | End: 2018-01-10

## 2018-01-07 RX ORDER — TAMOXIFEN CITRATE 10 MG/1
20 TABLET ORAL DAILY
Status: DISCONTINUED | OUTPATIENT
Start: 2018-01-08 | End: 2018-01-10

## 2018-01-07 RX ORDER — AMLODIPINE BESYLATE 5 MG/1
5 TABLET ORAL DAILY
Status: DISCONTINUED | OUTPATIENT
Start: 2018-01-07 | End: 2018-01-08

## 2018-01-07 RX ORDER — LISINOPRIL 20 MG/1
20 TABLET ORAL DAILY
Status: DISCONTINUED | OUTPATIENT
Start: 2018-01-07 | End: 2018-01-10

## 2018-01-07 RX ORDER — DULOXETIN HYDROCHLORIDE 30 MG/1
60 CAPSULE, DELAYED RELEASE ORAL DAILY
Status: DISCONTINUED | OUTPATIENT
Start: 2018-01-07 | End: 2018-01-10

## 2018-01-07 RX ORDER — ALPRAZOLAM 0.25 MG/1
0.25 TABLET ORAL 3 TIMES DAILY PRN
Status: DISCONTINUED | OUTPATIENT
Start: 2018-01-07 | End: 2018-01-10

## 2018-01-07 RX ORDER — TAMOXIFEN CITRATE 10 MG/1
20 TABLET ORAL DAILY
Status: DISCONTINUED | OUTPATIENT
Start: 2018-01-07 | End: 2018-01-07 | Stop reason: SDUPTHER

## 2018-01-07 NOTE — ED PROVIDER NOTES
Patient Seen in: [facility] Emergency Department    History   Patient presents with:  Dyspnea MILA SOB (respiratory)    Stated Complaint: dyspnea on exertion -- chronic.   Worsening today    HPI    70-year-old female with history of breast cancer, ane ESOPHAGOGASTRODUODENOSCOPY,                COLONOSCOPY, POSSIBLE BIOPSY, POSSIBLE                POLYPECTOMY [id number];  Surgeon:  Bret Santiago MD;  Location: [address] [place] [address]  12/11/2014: PATIENT Ciara Etienne Trachea midline. No cervical lymphadenopathy. HEART: Regular rate and rhythm, no murmurs. LUNGS: Clear to auscultation bilaterally. No Rales, no rhonchi, no wheezing, no stridor.   ABDOMEN: Soft, nondistended,non tender, bowel sounds are present, no linda ---------                               -----------         ------                     CBC W/ DIFFERENTIAL[[id number]]          Abnormal            Final result                 Please view results for these tests on the individual orders.    URINALYSIS WIT Medication List        Present on Admission  Date Reviewed: 11/19/2017          ICD-10-CM Noted POA    Gastrointestinal hemorrhage K92.2 1/7/2018 Unknown

## 2018-01-07 NOTE — ED NOTES
Rectal exam done by MD at this time accompanied by RN. Pt with + microscopic blood noted per hemoccult. Pt and daughter updated on plan of care.

## 2018-01-07 NOTE — ED INITIAL ASSESSMENT (HPI)
Pt presents to the ED accompanied by family with complaints of worsening dyspnea on exertion over past few weeks. Pt states she is short of breath with only walking short distances.  Pt states she has seen her doctor for this shortness of breath and has had

## 2018-01-08 ENCOUNTER — APPOINTMENT (OUTPATIENT)
Dept: CV DIAGNOSTICS | Facility: HOSPITAL | Age: 73
DRG: 378 | End: 2018-01-08
Attending: INTERNAL MEDICINE
Payer: MEDICARE

## 2018-01-08 ENCOUNTER — SURGERY (OUTPATIENT)
Age: 73
End: 2018-01-08

## 2018-01-08 LAB
ATRIAL RATE: 100 BPM
BASOPHILS # BLD AUTO: 0.06 X10(3) UL (ref 0–0.1)
BASOPHILS NFR BLD AUTO: 0.7 %
BUN BLD-MCNC: 20 MG/DL (ref 8–20)
CALCIUM BLD-MCNC: 8.9 MG/DL (ref 8.3–10.3)
CHLORIDE: 109 MMOL/L (ref 101–111)
CO2: 25 MMOL/L (ref 22–32)
CREAT BLD-MCNC: 1.07 MG/DL (ref 0.55–1.02)
EOSINOPHIL # BLD AUTO: 0.24 X10(3) UL (ref 0–0.3)
EOSINOPHIL NFR BLD AUTO: 2.8 %
ERYTHROCYTE [DISTWIDTH] IN BLOOD BY AUTOMATED COUNT: 16.2 % (ref 11.5–16)
GLUCOSE BLD-MCNC: 105 MG/DL (ref 70–99)
HAV IGM SER QL: 2.4 MG/DL (ref 1.7–3)
HCT VFR BLD AUTO: 24.4 % (ref 34–50)
HGB BLD-MCNC: 7.3 G/DL (ref 12–16)
IMMATURE GRANULOCYTE COUNT: 0.04 X10(3) UL (ref 0–1)
IMMATURE GRANULOCYTE RATIO %: 0.5 %
LYMPHOCYTES # BLD AUTO: 1.97 X10(3) UL (ref 0.9–4)
LYMPHOCYTES NFR BLD AUTO: 23 %
MCH RBC QN AUTO: 23.9 PG (ref 27–33.2)
MCHC RBC AUTO-ENTMCNC: 29.9 G/DL (ref 31–37)
MCV RBC AUTO: 79.7 FL (ref 81–100)
MONOCYTES # BLD AUTO: 0.74 X10(3) UL (ref 0.1–0.6)
MONOCYTES NFR BLD AUTO: 8.6 %
NEUTROPHIL ABS PRELIM: 5.52 X10 (3) UL (ref 1.3–6.7)
NEUTROPHILS # BLD AUTO: 5.52 X10(3) UL (ref 1.3–6.7)
NEUTROPHILS NFR BLD AUTO: 64.4 %
P AXIS: 35 DEGREES
P-R INTERVAL: 146 MS
PLATELET # BLD AUTO: 306 10(3)UL (ref 150–450)
POTASSIUM SERPL-SCNC: 4 MMOL/L (ref 3.6–5.1)
Q-T INTERVAL: 352 MS
QRS DURATION: 86 MS
QTC CALCULATION (BEZET): 454 MS
R AXIS: 3 DEGREES
RBC # BLD AUTO: 3.06 X10(6)UL (ref 3.8–5.1)
RED CELL DISTRIBUTION WIDTH-SD: 47 FL (ref 35.1–46.3)
SODIUM SERPL-SCNC: 142 MMOL/L (ref 136–144)
T AXIS: 63 DEGREES
VENTRICULAR RATE: 100 BPM
WBC # BLD AUTO: 8.6 X10(3) UL (ref 4–13)

## 2018-01-08 PROCEDURE — 99233 SBSQ HOSP IP/OBS HIGH 50: CPT | Performed by: HOSPITALIST

## 2018-01-08 PROCEDURE — 99222 1ST HOSP IP/OBS MODERATE 55: CPT | Performed by: INTERNAL MEDICINE

## 2018-01-08 PROCEDURE — 93306 TTE W/DOPPLER COMPLETE: CPT | Performed by: INTERNAL MEDICINE

## 2018-01-08 PROCEDURE — 0DB98ZX EXCISION OF DUODENUM, VIA NATURAL OR ARTIFICIAL OPENING ENDOSCOPIC, DIAGNOSTIC: ICD-10-PCS | Performed by: INTERNAL MEDICINE

## 2018-01-08 RX ORDER — MIDAZOLAM HYDROCHLORIDE 1 MG/ML
INJECTION INTRAMUSCULAR; INTRAVENOUS
Status: DISCONTINUED | OUTPATIENT
Start: 2018-01-08 | End: 2018-01-08 | Stop reason: HOSPADM

## 2018-01-08 RX ORDER — SODIUM CHLORIDE 9 MG/ML
INJECTION, SOLUTION INTRAVENOUS ONCE
Status: COMPLETED | OUTPATIENT
Start: 2018-01-08 | End: 2018-01-08

## 2018-01-08 NOTE — OPERATIVE REPORT
[address] Patient Status:  Inpatient    1945 MRN [id number]   [facility] ENDOSCOPY Attending Maury Troy MD   Hosp Day # 1 PCP Rosibel Craig MD         [facility]

## 2018-01-08 NOTE — ED NOTES
Awaiting protonix drip from pharmacy. Pt awake and alert, skin w/d,resps reg/unlabored. Pt appears comfortable and aware she is awaiting bed assignment.

## 2018-01-08 NOTE — ED NOTES
Report given to RUPALI Causey on floor at this time. Pt aware she is to remain NPO at this time per Dr. Nabil Louie and pt verbalized understanding. Lou ZAPIEN requesting pt transport to unit after shift change at 1930.  Pt appears comfortable on cart, watching TV at

## 2018-01-08 NOTE — H&P
[facility] [place] HOSPITALIST                                                               History & Physical         Devon Chavez Patient Status:  Inpatient    1945 MRN [id number]   [facility] 4NW-A Attending Yuri Vera MD   [facility] SURGERY      Comment: slipped disc  2014: CHEMOTHERAPY  12/11/2014: COLON CA SCRN NOT HI RSK IND N/A      Comment: Procedure: ESOPHAGOGASTRODUODENOSCOPY,                COLONOSCOPY, POSSIBLE BIOPSY, POSSIBLE                POLYPECTOMY 16004,[id number];  Surgeon drugs.     Allergies:    Codeine                     Home Medications:    Prescriptions Prior to Admission:  TAMOXIFEN CITRATE 20 MG Oral Tab TAKE 1 TABLET(20 MG) BY MOUTH EVERY DAY Disp: 30 tablet Rfl: 0 Taking   DULOXETINE HCL 60 MG Oral Cap DR Particles swelling noted. Integument: No lesions. No erythema. Psychiatric: Appropriate mood and affect.       Diagnostic Data:      Laboratory Data:     Lab Results  Component Value Date   WBC 8.8 01/07/2018   HGB 8.4 01/07/2018   HCT 28.2 01/07/2018   .0 0 deficiency anemia requiring IV iron infusions with oncology clinic– we will consult Dr. Tamra Hammond  4. Morbid obesity with a BMI of 40.5  5. Anxiety and depression-continue home medications including Cymbalta and Xanax as needed  6.  Essential hypertension-cont

## 2018-01-08 NOTE — PROGRESS NOTES
Full consult dictated. Patient well known to me form prior breast cancer occurrence. Also had iron deficiency at that time - required IV iron and had EGD and colonoscopy without source of blood loss. Gradually got SOB over the holidays. PFT's ok.   Bloo

## 2018-01-08 NOTE — TELEPHONE ENCOUNTER
Daughter answered and I explained that I was calling back regarding a message she had left, daughter stated that they where not happy with the care she was receiving and that she should of been seen sooner.  I did explain that her mom was not an easy person

## 2018-01-08 NOTE — PROGRESS NOTES
[facility]PITAL[facility]  Progress Note     Louellen Pass Patient Status:  Inpatient    1945 MRN [id number]   [facility] 4NW-A Attending Liliana Velarde MD   Hosp Day # 1 PCP Tunde Cardenas MD     Chief Complaint: sob with exertion anemia- resolved at rest  2. Hemoccult-positive in the emergency room, anemia, Protonix drip started in the emergency room- GI eval, r/o upper gi bleed, patient agreable with egd only for now  3.  History of breast cancer-status post left breast lumpectomy

## 2018-01-08 NOTE — CONSULTS
[address] Patient Status:  Inpatient    1945 MRN [id number]   [facility] 4NW-A Attending Goldie Oliva MD   Hosp Day # 1 PCP Kristan Fitzgerald MD       Asuncion Adam is a 74year old female for Hem ESOPHAGOGASTRODUODENOSCOPY,                COLONOSCOPY, POSSIBLE BIOPSY, POSSIBLE                POLYPECTOMY [id number],73431;  Surgeon:  Carlos Dunne MD;  Location: [address] [place] [address]  12/11/2014: PATIENT Kristen Doe developed, well nourished, NAD  SKIN: no rashes  HEENT: non-icteric  NECK: no JVD  LUNGS: CTA  CARDIO: RRR  GI: good BS's,no masses, HSM or tenderness RECTAL: Exam not done. EXTREM. : no edema, no clubbing NEURO: A + O    ASSESSMENT AND PLAN:   Niki Amanda

## 2018-01-09 ENCOUNTER — SURGERY (OUTPATIENT)
Age: 73
End: 2018-01-09

## 2018-01-09 LAB
BASOPHILS # BLD AUTO: 0.06 X10(3) UL (ref 0–0.1)
BASOPHILS NFR BLD AUTO: 0.7 %
EOSINOPHIL # BLD AUTO: 0.2 X10(3) UL (ref 0–0.3)
EOSINOPHIL NFR BLD AUTO: 2.2 %
ERYTHROCYTE [DISTWIDTH] IN BLOOD BY AUTOMATED COUNT: 16.4 % (ref 11.5–16)
HCT VFR BLD AUTO: 24.1 % (ref 34–50)
HGB BLD-MCNC: 7.2 G/DL (ref 12–16)
IMMATURE GRANULOCYTE COUNT: 0.04 X10(3) UL (ref 0–1)
IMMATURE GRANULOCYTE RATIO %: 0.4 %
LYMPHOCYTES # BLD AUTO: 2.43 X10(3) UL (ref 0.9–4)
LYMPHOCYTES NFR BLD AUTO: 26.7 %
MCH RBC QN AUTO: 24.1 PG (ref 27–33.2)
MCHC RBC AUTO-ENTMCNC: 29.9 G/DL (ref 31–37)
MCV RBC AUTO: 80.6 FL (ref 81–100)
MONOCYTES # BLD AUTO: 0.92 X10(3) UL (ref 0.1–0.6)
MONOCYTES NFR BLD AUTO: 10.1 %
NEUTROPHIL ABS PRELIM: 5.45 X10 (3) UL (ref 1.3–6.7)
NEUTROPHILS # BLD AUTO: 5.45 X10(3) UL (ref 1.3–6.7)
NEUTROPHILS NFR BLD AUTO: 59.9 %
PLATELET # BLD AUTO: 292 10(3)UL (ref 150–450)
RBC # BLD AUTO: 2.99 X10(6)UL (ref 3.8–5.1)
RED CELL DISTRIBUTION WIDTH-SD: 47.9 FL (ref 35.1–46.3)
WBC # BLD AUTO: 9.1 X10(3) UL (ref 4–13)

## 2018-01-09 PROCEDURE — 99231 SBSQ HOSP IP/OBS SF/LOW 25: CPT | Performed by: HOSPITALIST

## 2018-01-09 PROCEDURE — 0DJD8ZZ INSPECTION OF LOWER INTESTINAL TRACT, VIA NATURAL OR ARTIFICIAL OPENING ENDOSCOPIC: ICD-10-PCS | Performed by: INTERNAL MEDICINE

## 2018-01-09 PROCEDURE — 99232 SBSQ HOSP IP/OBS MODERATE 35: CPT | Performed by: INTERNAL MEDICINE

## 2018-01-09 RX ORDER — MIDAZOLAM HYDROCHLORIDE 1 MG/ML
INJECTION INTRAMUSCULAR; INTRAVENOUS
Status: DISCONTINUED | OUTPATIENT
Start: 2018-01-09 | End: 2018-01-09 | Stop reason: HOSPADM

## 2018-01-09 NOTE — PLAN OF CARE
Pt s/p EGD clear liquids and npo after midnight for colonoscopy in am. Pt voiding bowl prep started no bleeding noted at this time.  Pt up to commode report given to next shift

## 2018-01-09 NOTE — PLAN OF CARE
Assumed care @ 0730. Patient denies abdominal discomfort. Stool was clear and watery this morning. Colonoscopy done, procedure tolerated well. Patient's vital signs stable.

## 2018-01-09 NOTE — PLAN OF CARE
HEMATOLOGIC - ADULT    • Maintains hematologic stability Progressing    • Free from bleeding injury Progressing        Pt A/O x4. Completed Golytely. No bleeding noted. Clear bowel movement noted. Npo at midnight for colonoscopy. Vss. Slept well.  Denies pa

## 2018-01-09 NOTE — OPERATIVE REPORT
[address] Patient Status:  Inpatient    1945 MRN [id number]   [facility] ENDOSCOPY Attending Marleny De La Garza MD   Hosp Day # 2 PCP Alida Cho MD         PATIENT NAME: Veronica Meagan OF [place]

## 2018-01-09 NOTE — PROGRESS NOTES
Heme/Onc Progress Note    Patient Name: Isabel Chavez   YOB: 1945   Medical Record Number: [id number]   CSN: [id number]   Attending Physician: Britt Young M.D. Subjective:  Had EGD without significant findings.   Now prepped present. No edema. Neurological: Grossly intact. Lymphatics: There is no palpable lymphadenopathy throughout in the cervical, supraclavicular, axillary, or inguinal regions.   Psych/Depression: Spirits ok      Labs:    Recent Results (from the past 24 ho [address] [place] [address], [address] [place] [address]  [id number]

## 2018-01-09 NOTE — DISCHARGE SUMMARY
[facility] HOSPITALIST  DISCHARGE SUMMARY     Derrick Gaona Patient Status:  Inpatient    1945 MRN [id number]   [facility] 4NW-A Attending Rubi Donahue MD   Hosp Day # 2 PCP Ahmet Brice MD     Date of Admission: 2018  Date area.    Brief Synopsis: as above    Procedures during hospitalization:   • As above    Incidental or significant findings and recommendations (brief descriptions):   •     Lab/Test results pending at Discharge:   ·     Consultants:  • GI    Discharge Medic Positive bowel sounds. No rebound or guarding. Neurologic: No focal neurological deficits. Musculoskeletal: Moves all extremities. Extremities: No edema.   -----------------------------------------------------------------------------------------------

## 2018-01-10 VITALS
TEMPERATURE: 98 F | DIASTOLIC BLOOD PRESSURE: 77 MMHG | OXYGEN SATURATION: 92 % | HEIGHT: 67 IN | RESPIRATION RATE: 20 BRPM | WEIGHT: 258.63 LBS | SYSTOLIC BLOOD PRESSURE: 119 MMHG | HEART RATE: 93 BPM | BODY MASS INDEX: 40.59 KG/M2

## 2018-01-10 LAB
BASOPHILS # BLD AUTO: 0.05 X10(3) UL (ref 0–0.1)
BASOPHILS NFR BLD AUTO: 0.6 %
EOSINOPHIL # BLD AUTO: 0.38 X10(3) UL (ref 0–0.3)
EOSINOPHIL NFR BLD AUTO: 4.6 %
ERYTHROCYTE [DISTWIDTH] IN BLOOD BY AUTOMATED COUNT: 16.7 % (ref 11.5–16)
HCT VFR BLD AUTO: 24.9 % (ref 34–50)
HGB BLD-MCNC: 7.4 G/DL (ref 12–16)
IMMATURE GRANULOCYTE COUNT: 0.07 X10(3) UL (ref 0–1)
IMMATURE GRANULOCYTE RATIO %: 0.8 %
LYMPHOCYTES # BLD AUTO: 2.19 X10(3) UL (ref 0.9–4)
LYMPHOCYTES NFR BLD AUTO: 26.6 %
MCH RBC QN AUTO: 24.1 PG (ref 27–33.2)
MCHC RBC AUTO-ENTMCNC: 29.7 G/DL (ref 31–37)
MCV RBC AUTO: 81.1 FL (ref 81–100)
MONOCYTES # BLD AUTO: 0.81 X10(3) UL (ref 0.1–0.6)
MONOCYTES NFR BLD AUTO: 9.8 %
NEUTROPHIL ABS PRELIM: 4.74 X10 (3) UL (ref 1.3–6.7)
NEUTROPHILS # BLD AUTO: 4.74 X10(3) UL (ref 1.3–6.7)
NEUTROPHILS NFR BLD AUTO: 57.6 %
PLATELET # BLD AUTO: 280 10(3)UL (ref 150–450)
RBC # BLD AUTO: 3.07 X10(6)UL (ref 3.8–5.1)
RED CELL DISTRIBUTION WIDTH-SD: 47.9 FL (ref 35.1–46.3)
WBC # BLD AUTO: 8.2 X10(3) UL (ref 4–13)

## 2018-01-10 PROCEDURE — 99232 SBSQ HOSP IP/OBS MODERATE 35: CPT | Performed by: INTERNAL MEDICINE

## 2018-01-10 PROCEDURE — 99239 HOSP IP/OBS DSCHRG MGMT >30: CPT | Performed by: INTERNAL MEDICINE

## 2018-01-10 RX ORDER — ACETAMINOPHEN 325 MG/1
325 TABLET ORAL EVERY 6 HOURS PRN
Status: DISCONTINUED | OUTPATIENT
Start: 2018-01-10 | End: 2018-01-10

## 2018-01-10 NOTE — PLAN OF CARE
Diabetes/Glucose Control    • Glucose maintained within prescribed range Progressing        HEMATOLOGIC - ADULT    • Maintains hematologic stability Progressing    • Free from bleeding injury Progressing        Patient received this am alert and oriented r

## 2018-01-10 NOTE — PLAN OF CARE
HEMATOLOGIC - ADULT    • Maintains hematologic stability Progressing    • Free from bleeding injury Progressing        Pt A/ O x4. C/ O sob on exertion. Still weak. C/ o headache. hospitalist paged with tylenol given with good relief. No BM.  No bleeding no

## 2018-01-10 NOTE — PROGRESS NOTES
Heme/Onc Progress Note    Patient Name: Kushal Craig   YOB: 1945   Medical Record Number: [id number]   CSN: [id number]   Attending Physician: Mirella Del Toro M.D.      Subjective:  Colonoscopy unrevealing regarding source of blood pulses are present. No edema. Neurological: Grossly intact. Lymphatics: There is no palpable lymphadenopathy throughout in the cervical, supraclavicular, axillary, or inguinal regions.     Labs:  No results found for this or any previous visit (from the

## 2018-01-10 NOTE — DISCHARGE SUMMARY
[facility] PS[facility]IAT[facility]IC [place] HOSPITALIST  DISCHARGE SUMMARY     Fiona Griffith Patient Status:  Inpatient    1945 MRN [id number]   [facility] 4NW-A Attending Lillian Nagel MD   [facility] Day # 3 PCP Tunde Cardenas MD     Date of Admission: 2018  Date left breast lumpectomy and chemotherapy in 2014 and followed up with hematology oncology Dr. Anuja Dyer and had iron deficiency anemia and needed IV iron infusions for anemia at that time. Patient states she has never received any blood transfusions before. Michael Silverman - GI    Discharge Medication List:     Discharge Medications      CONTINUE taking these medications      Instructions Prescription details   ALPRAZolam 0.25 MG Tabs  Commonly known as:  XANAX      Take 1 tablet (0.25 mg total) by mouth 3 (three) darya

## 2018-01-12 ENCOUNTER — PATIENT OUTREACH (OUTPATIENT)
Dept: CASE MANAGEMENT | Age: 73
End: 2018-01-12

## 2018-01-12 LAB — BLOOD TYPE BARCODE: 9500

## 2018-01-12 NOTE — PROGRESS NOTES
Initial Post Discharge Follow Up   Discharge Date: 1/10/18  Contact Date: 1/12/2018    Consent Verification:  Assessment Completed With: Patient  HIPAA Verified?   Yes    Discharge Dx:   SOB, anemia, suspected GI bleed; S/P iv iron infusions EGD/Colonosc the hospital? no new medications  • May I go over your medications with you to make sure we are not missing anything? yes  • Are there any reasons that keep you from taking your medication as prescribed?  No    Referrals/orders at D/C:  Home Health ordered a confirmed TCM HFU on 1/18/17 with Dr. Mei Montano      Have you made all of your follow up appointments? yes    Is there any reason as to why you cannot make your appointments?    No     NCM Reviewed upcoming Specialist Appt with patient     Yes, patient ha

## 2018-01-15 NOTE — CONSULTS
[facility]    PATIENT'S NAME: Mikhail Boateng   ATTENDING PHYSICIAN: Marylin Carter MD   CONSULTING PHYSICIAN: Emy Amor M.D.    PATIENT ACCOUNT#:   [de-identified]    LOCATION:  [address] [place] [address]  MEDICAL RECORD #:   [id number]       DATE OF BI anxiety, depression, dyslipidemia, hypertension. PAST SURGICAL HISTORY:  She has had back surgery. She has had lumpectomy and sentinel node procedure. She has had previous upper and lower endoscopy.   She had radiation to the left breast.    Aline Leung if possible. I will start it here and give her doses of 300 mg of Venofer and then eventually continue this as an outpatient. She will probably need close to 1500 mg of iron in order to replete herself completely.   We will need to continue to check her h

## 2018-01-16 ENCOUNTER — OFFICE VISIT (OUTPATIENT)
Dept: HEMATOLOGY/ONCOLOGY | Facility: HOSPITAL | Age: 73
End: 2018-01-16
Attending: INTERNAL MEDICINE
Payer: MEDICARE

## 2018-01-16 VITALS
OXYGEN SATURATION: 100 % | TEMPERATURE: 98 F | DIASTOLIC BLOOD PRESSURE: 96 MMHG | RESPIRATION RATE: 18 BRPM | HEIGHT: 66.54 IN | SYSTOLIC BLOOD PRESSURE: 136 MMHG | HEART RATE: 60 BPM

## 2018-01-16 DIAGNOSIS — D50.0 IRON DEFICIENCY ANEMIA DUE TO CHRONIC BLOOD LOSS: ICD-10-CM

## 2018-01-16 DIAGNOSIS — C50.412 MALIGNANT NEOPLASM OF UPPER-OUTER QUADRANT OF LEFT BREAST IN FEMALE, ESTROGEN RECEPTOR POSITIVE (HCC): Primary | ICD-10-CM

## 2018-01-16 DIAGNOSIS — D50.0 IRON DEFICIENCY ANEMIA DUE TO CHRONIC BLOOD LOSS: Primary | ICD-10-CM

## 2018-01-16 DIAGNOSIS — Z17.0 MALIGNANT NEOPLASM OF UPPER-OUTER QUADRANT OF LEFT BREAST IN FEMALE, ESTROGEN RECEPTOR POSITIVE (HCC): Primary | ICD-10-CM

## 2018-01-16 DIAGNOSIS — Z17.0 MALIGNANT NEOPLASM OF UPPER-OUTER QUADRANT OF LEFT BREAST IN FEMALE, ESTROGEN RECEPTOR POSITIVE (HCC): ICD-10-CM

## 2018-01-16 DIAGNOSIS — C50.412 MALIGNANT NEOPLASM OF UPPER-OUTER QUADRANT OF LEFT BREAST IN FEMALE, ESTROGEN RECEPTOR POSITIVE (HCC): ICD-10-CM

## 2018-01-16 LAB
BASOPHILS # BLD AUTO: 0.08 X10(3) UL (ref 0–0.1)
BASOPHILS NFR BLD AUTO: 1.1 %
EOSINOPHIL # BLD AUTO: 0.43 X10(3) UL (ref 0–0.3)
EOSINOPHIL NFR BLD AUTO: 5.7 %
ERYTHROCYTE [DISTWIDTH] IN BLOOD BY AUTOMATED COUNT: 20.6 % (ref 11.5–16)
HCT VFR BLD AUTO: 31.7 % (ref 34–50)
HGB BLD-MCNC: 9.5 G/DL (ref 12–16)
IMMATURE GRANULOCYTE COUNT: 0.03 X10(3) UL (ref 0–1)
IMMATURE GRANULOCYTE RATIO %: 0.4 %
LYMPHOCYTES # BLD AUTO: 1.99 X10(3) UL (ref 0.9–4)
LYMPHOCYTES NFR BLD AUTO: 26.5 %
MCH RBC QN AUTO: 25.6 PG (ref 27–33.2)
MCHC RBC AUTO-ENTMCNC: 30 G/DL (ref 31–37)
MCV RBC AUTO: 85.4 FL (ref 81–100)
MONOCYTES # BLD AUTO: 0.46 X10(3) UL (ref 0.1–0.6)
MONOCYTES NFR BLD AUTO: 6.1 %
NEUTROPHIL ABS PRELIM: 4.51 X10 (3) UL (ref 1.3–6.7)
NEUTROPHILS # BLD AUTO: 4.51 X10(3) UL (ref 1.3–6.7)
NEUTROPHILS NFR BLD AUTO: 60.2 %
PLATELET # BLD AUTO: 332 10(3)UL (ref 150–450)
RBC # BLD AUTO: 3.71 X10(6)UL (ref 3.8–5.1)
RED CELL DISTRIBUTION WIDTH-SD: 58.1 FL (ref 35.1–46.3)
WBC # BLD AUTO: 7.5 X10(3) UL (ref 4–13)

## 2018-01-16 PROCEDURE — 96365 THER/PROPH/DIAG IV INF INIT: CPT

## 2018-01-16 PROCEDURE — 99214 OFFICE O/P EST MOD 30 MIN: CPT | Performed by: INTERNAL MEDICINE

## 2018-01-16 NOTE — PROGRESS NOTES
Patient is here for MD post hospital f/u. Pt was discharged last week. While in the hospital patient had an upper and lower GI. Pt had an iron infusion while in the hospital. Pt reports she feels a little but not where she would want to feel.  Appetite is g

## 2018-01-16 NOTE — PROGRESS NOTES
Education Record    Learner:  Patient    Disease / Diagnosis:fe def anemia    Barriers / Limitations:  None   Comments:    Method:  Discussion   Comments:    General Topics:  Medication, Procedure, Side effects and symptom management, Plan of care reviewed

## 2018-01-18 ENCOUNTER — OFFICE VISIT (OUTPATIENT)
Dept: FAMILY MEDICINE CLINIC | Facility: CLINIC | Age: 73
End: 2018-01-18

## 2018-01-18 VITALS
RESPIRATION RATE: 20 BRPM | OXYGEN SATURATION: 96 % | DIASTOLIC BLOOD PRESSURE: 58 MMHG | SYSTOLIC BLOOD PRESSURE: 126 MMHG | BODY MASS INDEX: 40.98 KG/M2 | WEIGHT: 258 LBS | HEIGHT: 66.5 IN | TEMPERATURE: 97 F | HEART RATE: 127 BPM

## 2018-01-18 DIAGNOSIS — K44.9 HIATAL HERNIA: ICD-10-CM

## 2018-01-18 DIAGNOSIS — F32.A DEPRESSION, UNSPECIFIED DEPRESSION TYPE: ICD-10-CM

## 2018-01-18 DIAGNOSIS — D50.9 IRON DEFICIENCY ANEMIA, UNSPECIFIED IRON DEFICIENCY ANEMIA TYPE: Primary | ICD-10-CM

## 2018-01-18 DIAGNOSIS — R06.00 DOE (DYSPNEA ON EXERTION): ICD-10-CM

## 2018-01-18 DIAGNOSIS — I10 ESSENTIAL HYPERTENSION: ICD-10-CM

## 2018-01-18 DIAGNOSIS — F41.9 ANXIETY: ICD-10-CM

## 2018-01-18 DIAGNOSIS — D64.9 SYMPTOMATIC ANEMIA: ICD-10-CM

## 2018-01-18 DIAGNOSIS — K64.8 INTERNAL HEMORRHOIDS: ICD-10-CM

## 2018-01-18 DIAGNOSIS — E78.2 HYPERLIPIDEMIA, MIXED: ICD-10-CM

## 2018-01-18 DIAGNOSIS — C50.912 BREAST CARCINOMA, FEMALE, LEFT (HCC): ICD-10-CM

## 2018-01-18 PROCEDURE — 99495 TRANSJ CARE MGMT MOD F2F 14D: CPT | Performed by: FAMILY MEDICINE

## 2018-01-18 RX ORDER — DULOXETIN HYDROCHLORIDE 60 MG/1
CAPSULE, DELAYED RELEASE ORAL
Qty: 90 CAPSULE | Refills: 0 | Status: SHIPPED | OUTPATIENT
Start: 2018-01-18 | End: 2018-05-10

## 2018-01-18 NOTE — PROGRESS NOTES
HPI:    Rhiannon Ram is a 74year old female here today for hospital follow up.    She was discharged from Inpatient hospital, [facility] to Home   Admission Date: 1/7/18   Discharge Date: 1/10/18  Hospital Discharge Diagnoses (since 12/19  Denies any associated abdominal pain.  Shortness of breath relieved with rest according to patient.  Denies any headache.  Denies any dysuria frequency.  Denies any heartburn.  Denies any dizziness.  Denies any diaphoresis.  Denies any lower extremity swe total cholesterol come back elevated. Patient said that she had muscle cramps related to the previous medication. Will try maybe Crestor in the spring after she is done with evaluation of her anemia. Patient will have a blood work repeated at that time. female; Breast Cancer (age of onset: 77) in her self; Breast Cancer (age of onset: 70) in her sister; COPD in her sister; Heart Attack in her father; pneumonia in her mother; unknown cause 28 in her brother.    She  reports that she quit smoking about 24 ye tenderness  MUSCULOSKELETAL: back is not tender, FROM of the extremities  EXTREMITIES: no cyanosis, clubbing or edema  NEURO: Oriented times three, cranial nerves are intact, motor and sensory are grossly intact      Results for orders placed or performed additional testing to look for the source of the hemoglobin drop. Symptomatic anemia with shortness of breath shortness of breath slightly improved, . anticipate improvement as the iron levels and hemoglobin improved.      Dyspnea on exertion monitor seem Risk:   severe    Patient seen within 14 days from date of discharge.      Stephanie Feldman MD, 1/18/2018

## 2018-01-22 NOTE — PROGRESS NOTES
[facility]    PATIENT'S NAME: Reinaldo Swanson   ATTENDING PHYSICIAN: Elsa Johnson M.D.    PATIENT ACCOUNT #: [de-identified] LOCATION: [address] [place] [address] RECORD #: [id number] YOB: 1945   DATE OF SERVICE: 01/16/2018       CAN weight is 258 pounds. Blood pressure is 136/96, pulse is 60, respiratory rate is 20, temperature is 97.7. HEENT:  Unremarkable. She has pink conjunctivae, anicteric sclerae. Pharynx without lesions.   LYMPHATICS:  She has no cervical, supraclavicular, o

## 2018-01-29 ENCOUNTER — TELEPHONE (OUTPATIENT)
Dept: FAMILY MEDICINE CLINIC | Facility: CLINIC | Age: 73
End: 2018-01-29

## 2018-01-29 NOTE — TELEPHONE ENCOUNTER
Daughter dropped off pt's parking placard for Dr. Abdiel Angel to complete. Advised daughter there may be a $25 fee for form to be completed outside an office visit. This msg printed and put in Dr. Tyrell Hopper in box.

## 2018-01-29 NOTE — TELEPHONE ENCOUNTER
Pt'd daughter dropped off parking placard form to be completed by Dr. Joradna Long. Daughter advised there may be a $25 fee for form to be completed. Pls call daughter when completed. This msg printed and put in Dr. Minh Mcwilliams in box.

## 2018-01-30 ENCOUNTER — NURSE ONLY (OUTPATIENT)
Dept: HEMATOLOGY/ONCOLOGY | Facility: HOSPITAL | Age: 73
End: 2018-01-30
Attending: INTERNAL MEDICINE
Payer: MEDICARE

## 2018-01-30 DIAGNOSIS — Z17.0 MALIGNANT NEOPLASM OF UPPER-OUTER QUADRANT OF LEFT BREAST IN FEMALE, ESTROGEN RECEPTOR POSITIVE (HCC): ICD-10-CM

## 2018-01-30 DIAGNOSIS — D50.0 IRON DEFICIENCY ANEMIA DUE TO CHRONIC BLOOD LOSS: ICD-10-CM

## 2018-01-30 DIAGNOSIS — C50.412 MALIGNANT NEOPLASM OF UPPER-OUTER QUADRANT OF LEFT BREAST IN FEMALE, ESTROGEN RECEPTOR POSITIVE (HCC): ICD-10-CM

## 2018-01-30 LAB
BASOPHILS # BLD AUTO: 0.08 X10(3) UL (ref 0–0.1)
BASOPHILS NFR BLD AUTO: 1.2 %
DEPRECATED HBV CORE AB SER IA-ACNC: 431.5 NG/ML (ref 10–291)
EOSINOPHIL # BLD AUTO: 0.35 X10(3) UL (ref 0–0.3)
EOSINOPHIL NFR BLD AUTO: 5.2 %
ERYTHROCYTE [DISTWIDTH] IN BLOOD BY AUTOMATED COUNT: 22.6 % (ref 11.5–16)
HCT VFR BLD AUTO: 33 % (ref 34–50)
HGB BLD-MCNC: 10.1 G/DL (ref 12–16)
IMMATURE GRANULOCYTE COUNT: 0.02 X10(3) UL (ref 0–1)
IMMATURE GRANULOCYTE RATIO %: 0.3 %
IRON SATURATION: 15 % (ref 13–45)
IRON: 51 UG/DL (ref 28–170)
LYMPHOCYTES # BLD AUTO: 2.02 X10(3) UL (ref 0.9–4)
LYMPHOCYTES NFR BLD AUTO: 30.1 %
MCH RBC QN AUTO: 27 PG (ref 27–33.2)
MCHC RBC AUTO-ENTMCNC: 30.6 G/DL (ref 31–37)
MCV RBC AUTO: 88.2 FL (ref 81–100)
MONOCYTES # BLD AUTO: 0.5 X10(3) UL (ref 0.1–0.6)
MONOCYTES NFR BLD AUTO: 7.4 %
MORPHOLOGY: NORMAL
NEUTROPHIL ABS PRELIM: 3.75 X10 (3) UL (ref 1.3–6.7)
NEUTROPHILS # BLD AUTO: 3.75 X10(3) UL (ref 1.3–6.7)
NEUTROPHILS NFR BLD AUTO: 55.8 %
PLATELET # BLD AUTO: 269 10(3)UL (ref 150–450)
PLATELET MORPHOLOGY: NORMAL
RBC # BLD AUTO: 3.74 X10(6)UL (ref 3.8–5.1)
RED CELL DISTRIBUTION WIDTH-SD: 71.3 FL (ref 35.1–46.3)
TOTAL IRON BINDING CAPACITY: 349 UG/DL (ref 298–536)
TRANSFERRIN: 234 MG/DL (ref 200–360)
WBC # BLD AUTO: 6.7 X10(3) UL (ref 4–13)

## 2018-01-30 PROCEDURE — 83540 ASSAY OF IRON: CPT

## 2018-01-30 PROCEDURE — 36415 COLL VENOUS BLD VENIPUNCTURE: CPT

## 2018-01-30 PROCEDURE — 85025 COMPLETE CBC W/AUTO DIFF WBC: CPT

## 2018-01-30 PROCEDURE — 83550 IRON BINDING TEST: CPT

## 2018-01-30 PROCEDURE — 82728 ASSAY OF FERRITIN: CPT

## 2018-01-31 ENCOUNTER — SNF/IP PROF CHARGE ONLY (OUTPATIENT)
Dept: HEMATOLOGY/ONCOLOGY | Facility: HOSPITAL | Age: 73
End: 2018-01-31

## 2018-01-31 ENCOUNTER — TELEPHONE (OUTPATIENT)
Dept: FAMILY MEDICINE CLINIC | Facility: CLINIC | Age: 73
End: 2018-01-31

## 2018-01-31 DIAGNOSIS — C50.912 BREAST CARCINOMA, FEMALE, LEFT (HCC): ICD-10-CM

## 2018-01-31 PROCEDURE — G9678 ONCOLOGY CARE MODEL SERVICE: HCPCS | Performed by: INTERNAL MEDICINE

## 2018-01-31 NOTE — TELEPHONE ENCOUNTER
Spoke with Shearon Blizzard the patient daughter and she stated   That this will be her mom's first placard.    She's never had one in the past.

## 2018-02-01 NOTE — TELEPHONE ENCOUNTER
Spoke with Leslie Rocha the patient and she stated that no she doesn't have any of those criteria's   That are listed. She stated that she can't walk far due to her Anemia. She stated that she gets SOB  A lot especially while walking.  She doesn't walk with a cane

## 2018-02-02 RX ORDER — TAMOXIFEN CITRATE 20 MG/1
20 TABLET ORAL DAILY
Qty: 90 TABLET | Refills: 1 | Status: SHIPPED | OUTPATIENT
Start: 2018-02-02 | End: 2018-08-07

## 2018-02-02 NOTE — TELEPHONE ENCOUNTER
Reviewed notes as below. Will use patient shortness of breath due to severe anemia and history of breast cancer as a diagnosis for 3 months parking placard. Filled out form was given to [address] [place] [address].

## 2018-02-06 NOTE — TELEPHONE ENCOUNTER
George Sanchez stopped by to  placard. Page two was not completed. Pls call Jami Po [phone] when ready. This msg printed and put on Montrell's chair with form.

## 2018-02-12 RX ORDER — TAMOXIFEN CITRATE 20 MG/1
TABLET ORAL
Qty: 30 TABLET | Refills: 0 | OUTPATIENT
Start: 2018-02-12

## 2018-02-12 RX ORDER — DULOXETIN HYDROCHLORIDE 60 MG/1
CAPSULE, DELAYED RELEASE ORAL
Qty: 30 CAPSULE | Refills: 0 | OUTPATIENT
Start: 2018-02-12

## 2018-02-26 ENCOUNTER — TELEPHONE (OUTPATIENT)
Dept: HEMATOLOGY/ONCOLOGY | Facility: HOSPITAL | Age: 73
End: 2018-02-26

## 2018-02-28 ENCOUNTER — SNF/IP PROF CHARGE ONLY (OUTPATIENT)
Dept: HEMATOLOGY/ONCOLOGY | Facility: HOSPITAL | Age: 73
End: 2018-02-28

## 2018-02-28 DIAGNOSIS — C50.912 BREAST CARCINOMA, FEMALE, LEFT (HCC): ICD-10-CM

## 2018-02-28 PROCEDURE — G9678 ONCOLOGY CARE MODEL SERVICE: HCPCS | Performed by: INTERNAL MEDICINE

## 2018-03-31 ENCOUNTER — SNF/IP PROF CHARGE ONLY (OUTPATIENT)
Dept: HEMATOLOGY/ONCOLOGY | Facility: HOSPITAL | Age: 73
End: 2018-03-31

## 2018-03-31 DIAGNOSIS — C50.912 BREAST CARCINOMA, FEMALE, LEFT (HCC): ICD-10-CM

## 2018-03-31 PROCEDURE — G9678 ONCOLOGY CARE MODEL SERVICE: HCPCS | Performed by: INTERNAL MEDICINE

## 2018-04-30 ENCOUNTER — SNF/IP PROF CHARGE ONLY (OUTPATIENT)
Dept: HEMATOLOGY/ONCOLOGY | Facility: HOSPITAL | Age: 73
End: 2018-04-30

## 2018-04-30 DIAGNOSIS — C50.912 BREAST CARCINOMA, FEMALE, LEFT (HCC): ICD-10-CM

## 2018-04-30 PROCEDURE — G9678 ONCOLOGY CARE MODEL SERVICE: HCPCS | Performed by: INTERNAL MEDICINE

## 2018-05-09 NOTE — TELEPHONE ENCOUNTER
DULOXETINE DR 60MG CAPSULES    Not a per protocol medication. Rx is pending for your approval.    Please sign,    Thank you    Last OV was on 01*18*18. Last refill was on 01*18*18 for 90/0 refills.

## 2018-05-10 ENCOUNTER — OFFICE VISIT (OUTPATIENT)
Dept: FAMILY MEDICINE CLINIC | Facility: CLINIC | Age: 73
End: 2018-05-10

## 2018-05-10 VITALS
TEMPERATURE: 98 F | HEIGHT: 66.5 IN | SYSTOLIC BLOOD PRESSURE: 126 MMHG | WEIGHT: 257 LBS | DIASTOLIC BLOOD PRESSURE: 64 MMHG | BODY MASS INDEX: 40.81 KG/M2 | RESPIRATION RATE: 18 BRPM | HEART RATE: 116 BPM

## 2018-05-10 DIAGNOSIS — F41.9 ANXIETY: ICD-10-CM

## 2018-05-10 DIAGNOSIS — D50.9 IRON DEFICIENCY ANEMIA, UNSPECIFIED IRON DEFICIENCY ANEMIA TYPE: ICD-10-CM

## 2018-05-10 DIAGNOSIS — C50.912 BREAST CARCINOMA, FEMALE, LEFT (HCC): ICD-10-CM

## 2018-05-10 DIAGNOSIS — I10 ESSENTIAL HYPERTENSION: Primary | ICD-10-CM

## 2018-05-10 DIAGNOSIS — E78.2 HYPERLIPIDEMIA, MIXED: ICD-10-CM

## 2018-05-10 DIAGNOSIS — K44.9 HIATAL HERNIA: ICD-10-CM

## 2018-05-10 DIAGNOSIS — R53.83 FATIGUE, UNSPECIFIED TYPE: ICD-10-CM

## 2018-05-10 PROCEDURE — 99214 OFFICE O/P EST MOD 30 MIN: CPT | Performed by: FAMILY MEDICINE

## 2018-05-10 PROCEDURE — 90670 PCV13 VACCINE IM: CPT | Performed by: FAMILY MEDICINE

## 2018-05-10 PROCEDURE — G0009 ADMIN PNEUMOCOCCAL VACCINE: HCPCS | Performed by: FAMILY MEDICINE

## 2018-05-10 RX ORDER — DULOXETIN HYDROCHLORIDE 60 MG/1
CAPSULE, DELAYED RELEASE ORAL
Qty: 90 CAPSULE | Refills: 1 | Status: SHIPPED | OUTPATIENT
Start: 2018-05-10 | End: 2018-10-17

## 2018-05-10 RX ORDER — BENAZEPRIL HYDROCHLORIDE 20 MG/1
20 TABLET ORAL DAILY
Qty: 90 TABLET | Refills: 1 | Status: SHIPPED | OUTPATIENT
Start: 2018-05-10 | End: 2018-10-17

## 2018-05-10 RX ORDER — DULOXETIN HYDROCHLORIDE 60 MG/1
CAPSULE, DELAYED RELEASE ORAL
Qty: 90 CAPSULE | Refills: 0 | OUTPATIENT
Start: 2018-05-10

## 2018-05-10 RX ORDER — AMLODIPINE BESYLATE 5 MG/1
5 TABLET ORAL DAILY
Qty: 90 TABLET | Refills: 1 | Status: SHIPPED | OUTPATIENT
Start: 2018-05-10 | End: 2018-10-17

## 2018-05-10 NOTE — PROGRESS NOTES
David Joe is a 74year old female. cc hypertension, anemia, depression/anxiety, breast cancer, hypercholesterolemia, obese  HPI:   HTN - doing well with medications. No side effects. BP is controlled at home. Takes meds regularly.  Needs refill Shortness of breath    • Unspecified essential hypertension       Social History:  Smoking status: Former Smoker                                                              Packs/day: 1.00      Years: 20.00        Quit date: 10/10/1993  Smokeless tobacco: 10(3)uL   MCV 88.2 81.0 - 100.0 fL   MCH 27.0 27.0 - 33.2 pg   MCHC 30.6 (L) 31.0 - 37.0 g/dL   RDW 22.6 (H) 11.5 - 16.0 %   RDW-SD 71.3 (H) 35.1 - 46.3 fL   Neutrophil Absolute Prelim 3.75 1.30 - 6.70 x10 (3) uL   Neutrophil Absolute 3.75 1.30 - 6.70 x10(

## 2018-05-14 ENCOUNTER — LAB ENCOUNTER (OUTPATIENT)
Dept: LAB | Age: 73
End: 2018-05-14
Attending: FAMILY MEDICINE
Payer: MEDICARE

## 2018-05-14 DIAGNOSIS — Z13.6 SCREENING FOR CARDIOVASCULAR CONDITION: ICD-10-CM

## 2018-05-14 DIAGNOSIS — E78.00 HYPERCHOLESTEREMIA: ICD-10-CM

## 2018-05-14 DIAGNOSIS — D50.9 IRON DEFICIENCY ANEMIA, UNSPECIFIED IRON DEFICIENCY ANEMIA TYPE: ICD-10-CM

## 2018-05-14 DIAGNOSIS — Z00.00 ENCOUNTER FOR MEDICARE ANNUAL WELLNESS EXAM: ICD-10-CM

## 2018-05-14 DIAGNOSIS — R53.83 FATIGUE, UNSPECIFIED TYPE: ICD-10-CM

## 2018-05-14 DIAGNOSIS — E78.2 HYPERLIPIDEMIA, MIXED: ICD-10-CM

## 2018-05-14 DIAGNOSIS — C50.919 MALIGNANT NEOPLASM OF FEMALE BREAST, UNSPECIFIED ESTROGEN RECEPTOR STATUS, UNSPECIFIED LATERALITY, UNSPECIFIED SITE OF BREAST (HCC): ICD-10-CM

## 2018-05-14 DIAGNOSIS — I10 ESSENTIAL HYPERTENSION: ICD-10-CM

## 2018-05-14 PROCEDURE — 82728 ASSAY OF FERRITIN: CPT

## 2018-05-14 PROCEDURE — 83540 ASSAY OF IRON: CPT

## 2018-05-14 PROCEDURE — 85025 COMPLETE CBC W/AUTO DIFF WBC: CPT

## 2018-05-14 PROCEDURE — 36415 COLL VENOUS BLD VENIPUNCTURE: CPT

## 2018-05-14 PROCEDURE — 82607 VITAMIN B-12: CPT

## 2018-05-14 PROCEDURE — 83550 IRON BINDING TEST: CPT

## 2018-05-14 PROCEDURE — 80053 COMPREHEN METABOLIC PANEL: CPT

## 2018-05-14 PROCEDURE — 84443 ASSAY THYROID STIM HORMONE: CPT

## 2018-05-14 PROCEDURE — 82306 VITAMIN D 25 HYDROXY: CPT

## 2018-05-14 PROCEDURE — 80061 LIPID PANEL: CPT

## 2018-05-16 DIAGNOSIS — E55.9 VITAMIN D DEFICIENCY: Primary | ICD-10-CM

## 2018-05-16 RX ORDER — ERGOCALCIFEROL 1.25 MG/1
50000 CAPSULE ORAL WEEKLY
Qty: 12 CAPSULE | Refills: 0 | Status: SHIPPED | OUTPATIENT
Start: 2018-05-16 | End: 2018-06-15

## 2018-05-25 ENCOUNTER — APPOINTMENT (OUTPATIENT)
Dept: HEMATOLOGY/ONCOLOGY | Facility: HOSPITAL | Age: 73
End: 2018-05-25
Attending: INTERNAL MEDICINE
Payer: MEDICARE

## 2018-05-31 ENCOUNTER — SNF/IP PROF CHARGE ONLY (OUTPATIENT)
Dept: HEMATOLOGY/ONCOLOGY | Facility: HOSPITAL | Age: 73
End: 2018-05-31

## 2018-05-31 DIAGNOSIS — C50.912 BREAST CARCINOMA, FEMALE, LEFT (HCC): ICD-10-CM

## 2018-05-31 PROCEDURE — G9678 ONCOLOGY CARE MODEL SERVICE: HCPCS | Performed by: INTERNAL MEDICINE

## 2018-06-01 ENCOUNTER — OFFICE VISIT (OUTPATIENT)
Dept: HEMATOLOGY/ONCOLOGY | Facility: HOSPITAL | Age: 73
End: 2018-06-01
Attending: INTERNAL MEDICINE
Payer: MEDICARE

## 2018-06-01 VITALS
HEART RATE: 103 BPM | OXYGEN SATURATION: 93 % | TEMPERATURE: 98 F | DIASTOLIC BLOOD PRESSURE: 82 MMHG | SYSTOLIC BLOOD PRESSURE: 133 MMHG | RESPIRATION RATE: 18 BRPM

## 2018-06-01 DIAGNOSIS — Z85.3 HISTORY OF BREAST CANCER: ICD-10-CM

## 2018-06-01 DIAGNOSIS — Z78.0 ASYMPTOMATIC MENOPAUSAL STATE: ICD-10-CM

## 2018-06-01 DIAGNOSIS — D50.0 IRON DEFICIENCY ANEMIA DUE TO CHRONIC BLOOD LOSS: Primary | ICD-10-CM

## 2018-06-01 PROCEDURE — 99213 OFFICE O/P EST LOW 20 MIN: CPT | Performed by: INTERNAL MEDICINE

## 2018-06-01 NOTE — PROGRESS NOTES
Outpatient Oncology Care Plan  Problem list:  knowledge deficit    Problems related to:    disease/disease progression    Interventions:  provided general teaching    Expected outcomes:  understands plan of care    Progress towards outcome:  making progres

## 2018-06-05 NOTE — PROGRESS NOTES
[facility]    PATIENT'S NAME: Fiona Aidan   ATTENDING PHYSICIAN: Britt Young M.D.    PATIENT ACCOUNT #: [de-identified] LOCATION: [address] [place] [address] RECORD #: [id number] YOB: 1945   DATE OF SERVICE: 06/01/2018       CAN tamoxifen. PHYSICAL EXAMINATION:    GENERAL:  She is a well-developed, obese female in no acute distress. VITAL SIGNS:  Her performance status is 0. Her weight is 257 pounds.   Blood pressure is 133/82, pulse 103, respiratory rate is 20, temperature is

## 2018-06-30 ENCOUNTER — SNF/IP PROF CHARGE ONLY (OUTPATIENT)
Dept: HEMATOLOGY/ONCOLOGY | Facility: HOSPITAL | Age: 73
End: 2018-06-30

## 2018-06-30 DIAGNOSIS — C50.412 MALIGNANT NEOPLASM OF UPPER-OUTER QUADRANT OF LEFT BREAST IN FEMALE, ESTROGEN RECEPTOR POSITIVE (HCC): ICD-10-CM

## 2018-06-30 DIAGNOSIS — Z17.0 MALIGNANT NEOPLASM OF UPPER-OUTER QUADRANT OF LEFT BREAST IN FEMALE, ESTROGEN RECEPTOR POSITIVE (HCC): ICD-10-CM

## 2018-06-30 PROCEDURE — G9678 ONCOLOGY CARE MODEL SERVICE: HCPCS | Performed by: INTERNAL MEDICINE

## 2018-08-03 ENCOUNTER — TELEPHONE (OUTPATIENT)
Dept: FAMILY MEDICINE CLINIC | Facility: CLINIC | Age: 73
End: 2018-08-03

## 2018-08-07 RX ORDER — CELECOXIB 200 MG/1
CAPSULE ORAL
Qty: 30 CAPSULE | Refills: 3 | Status: SHIPPED | OUTPATIENT
Start: 2018-08-07 | End: 2018-11-04

## 2018-08-07 RX ORDER — TAMOXIFEN CITRATE 20 MG/1
TABLET ORAL
Qty: 90 TABLET | Refills: 3 | Status: SHIPPED | OUTPATIENT
Start: 2018-08-07 | End: 2019-07-15

## 2018-08-11 DIAGNOSIS — E55.9 VITAMIN D DEFICIENCY: ICD-10-CM

## 2018-08-11 RX ORDER — ERGOCALCIFEROL 1.25 MG/1
CAPSULE ORAL
Qty: 12 CAPSULE | Refills: 0 | OUTPATIENT
Start: 2018-08-11

## 2018-10-17 ENCOUNTER — OFFICE VISIT (OUTPATIENT)
Dept: FAMILY MEDICINE CLINIC | Facility: CLINIC | Age: 73
End: 2018-10-17
Payer: MEDICARE

## 2018-10-17 VITALS
BODY MASS INDEX: 40.81 KG/M2 | WEIGHT: 257 LBS | HEIGHT: 66.5 IN | TEMPERATURE: 99 F | HEART RATE: 102 BPM | RESPIRATION RATE: 18 BRPM | SYSTOLIC BLOOD PRESSURE: 132 MMHG | DIASTOLIC BLOOD PRESSURE: 72 MMHG

## 2018-10-17 DIAGNOSIS — C50.912 BREAST CARCINOMA, FEMALE, LEFT (HCC): ICD-10-CM

## 2018-10-17 DIAGNOSIS — M25.561 BILATERAL CHRONIC KNEE PAIN: ICD-10-CM

## 2018-10-17 DIAGNOSIS — D50.9 IRON DEFICIENCY ANEMIA, UNSPECIFIED IRON DEFICIENCY ANEMIA TYPE: ICD-10-CM

## 2018-10-17 DIAGNOSIS — R06.00 DOE (DYSPNEA ON EXERTION): ICD-10-CM

## 2018-10-17 DIAGNOSIS — M25.562 BILATERAL CHRONIC KNEE PAIN: ICD-10-CM

## 2018-10-17 DIAGNOSIS — G89.29 BILATERAL CHRONIC KNEE PAIN: ICD-10-CM

## 2018-10-17 DIAGNOSIS — F32.A DEPRESSION, UNSPECIFIED DEPRESSION TYPE: ICD-10-CM

## 2018-10-17 DIAGNOSIS — H91.93 DECREASED HEARING OF BOTH EARS: ICD-10-CM

## 2018-10-17 DIAGNOSIS — I10 ESSENTIAL HYPERTENSION: ICD-10-CM

## 2018-10-17 DIAGNOSIS — E55.9 VITAMIN D DEFICIENCY: ICD-10-CM

## 2018-10-17 DIAGNOSIS — K44.9 HIATAL HERNIA: ICD-10-CM

## 2018-10-17 DIAGNOSIS — Z00.00 MEDICARE ANNUAL WELLNESS VISIT, SUBSEQUENT: Primary | ICD-10-CM

## 2018-10-17 DIAGNOSIS — E78.2 HYPERLIPIDEMIA, MIXED: ICD-10-CM

## 2018-10-17 PROCEDURE — 90653 IIV ADJUVANT VACCINE IM: CPT | Performed by: FAMILY MEDICINE

## 2018-10-17 PROCEDURE — G0008 ADMIN INFLUENZA VIRUS VAC: HCPCS | Performed by: FAMILY MEDICINE

## 2018-10-17 PROCEDURE — G0444 DEPRESSION SCREEN ANNUAL: HCPCS | Performed by: FAMILY MEDICINE

## 2018-10-17 PROCEDURE — 99214 OFFICE O/P EST MOD 30 MIN: CPT | Performed by: FAMILY MEDICINE

## 2018-10-17 PROCEDURE — G0439 PPPS, SUBSEQ VISIT: HCPCS | Performed by: FAMILY MEDICINE

## 2018-10-17 RX ORDER — DULOXETIN HYDROCHLORIDE 60 MG/1
CAPSULE, DELAYED RELEASE ORAL
Qty: 90 CAPSULE | Refills: 1 | Status: SHIPPED | OUTPATIENT
Start: 2018-10-17 | End: 2019-04-16

## 2018-10-17 RX ORDER — AMLODIPINE BESYLATE 5 MG/1
5 TABLET ORAL DAILY
Qty: 90 TABLET | Refills: 1 | Status: SHIPPED | OUTPATIENT
Start: 2018-10-17 | End: 2020-01-13

## 2018-10-17 RX ORDER — BENAZEPRIL HYDROCHLORIDE 20 MG/1
20 TABLET ORAL DAILY
Qty: 90 TABLET | Refills: 1 | Status: SHIPPED | OUTPATIENT
Start: 2018-10-17 | End: 2020-01-13

## 2018-10-17 NOTE — PATIENT INSTRUCTIONS
Continue current meds. Watch diet for fats and carbs. Stay active. call orthopedic doctor for evaluation. Call ENT Dr. Vj Rehman for hearing evaluation.     Consider doing weight loss program.      Abigail Alonso SCREENING SCHEDULE   Tests Welcome to Medicare Visit    Abdominal aortic aneurysm screening (once between ages 73-68) IPPE only No results found for this or any previous visit.  Limited to patients who meet one of the following criteria:   • Men who are 73-68 years old and have smoke Mammogram      Mammogram    Recommend Annually to at least age 76, and as needed after 76 There are no preventive care reminders to display for this patient.  Please get this Mammogram regularly   Immunizations      Influenza  Covered Annually No orders fou (the forms are also available in [facility])  [email or web address] org  This link also has information from the [facility] regarding Advance Directives.   [address] SCREENING SCHEDULE   Tests on this list are recommended by you Abdominal aortic aneurysm screening (once between ages 73-68) IPPE only No results found for this or any previous visit.  Limited to patients who meet one of the following criteria:   • Men who are 73-68 years old and have smoked more than 100 cigarettes in Recommend Annually to at least age 76, and as needed after 76 There are no preventive care reminders to display for this patient.  Please get this Mammogram regularly   Immunizations      Influenza  Covered Annually No orders found for this or any previou available in [address])  www. [email or web address] org  This link also has information from the [address] [place] [address] regarding Advance Directives.

## 2018-10-17 NOTE — PROGRESS NOTES
HPI:   Katlin Mejia is a 74year old female who presents for a Medicare Subsequent Annual Wellness visit (Pt already had Initial Annual Wellness) also follow-up on hypertension hyperlipidemia anxiety/depression anemia obesity, decreased hearin pleasure in doing things (over the last two weeks)?: Not at all  Feeling down, depressed, or hopeless (over the last two weeks)?: Not at all  PHQ-2 SCORE: 0     Advanced Directive:   She does have a Living Will but we do NOT have it on file in [address] [place] [address] Rd.    Not 88 05/14/2018        CBC  (most recent labs)   Lab Results   Component Value Date    WBC 7.3 05/14/2018    HGB 12.7 05/14/2018    .0 05/14/2018        ALLERGIES:   She is allergic to codeine.     CURRENT MEDICATIONS:     Outpatient Medications Marked mother; unknown cause 28 in her brother. SOCIAL HISTORY:   She  reports that she quit smoking about 25 years ago. She has a 20.00 pack-year smoking history. she has never used smokeless tobacco. She reports that she does not drink alcohol or use drugs. tenderness/mass/nodules;    Back:   Symmetric, no curvature, ROM normal, no CVA tenderness   Lungs:   Clear to auscultation bilaterally, respirations unlabored   Heart:  Regular rate and rhythm, S1 and S2 normal, no murmur,    Abdomen:   Soft, non-tender, type  -     DULoxetine HCl 60 MG Oral Cap DR Particles; TAKE 1 CAPSULE(60 MG) BY MOUTH EVERY DAY    Other orders  -     FLU VACCINE ADJUVANT IM  -     FLU VACCINE ADJUVANT IM    Decreased hearing of both ears refer to ENT Dr. Jennifer Rosario for evaluation.     Bi Component Value Date    A1C 5.3 09/09/2016    No flowsheet data found.     Fasting Blood Sugar (FSB)Annually Glucose (mg/dL)   Date Value   05/14/2018 88   07/18/2006 99     GLUCOSE (mg/dL)   Date Value   08/08/2014 92          Cardiovascular Disease Scre found Please get once after your 65th birthday    Hepatitis B for Moderate/High Risk No vaccine history found Medium/high risk factors:   End-stage renal disease   Hemophiliacs who received Factor VIII or IX concentrates   Clients of institutions for the

## 2018-10-19 ENCOUNTER — LAB ENCOUNTER (OUTPATIENT)
Dept: LAB | Age: 73
End: 2018-10-19
Attending: FAMILY MEDICINE
Payer: MEDICARE

## 2018-10-19 DIAGNOSIS — E55.9 VITAMIN D DEFICIENCY: ICD-10-CM

## 2018-10-19 DIAGNOSIS — E78.2 HYPERLIPIDEMIA, MIXED: ICD-10-CM

## 2018-10-19 DIAGNOSIS — D50.9 IRON DEFICIENCY ANEMIA, UNSPECIFIED IRON DEFICIENCY ANEMIA TYPE: ICD-10-CM

## 2018-10-19 DIAGNOSIS — I10 ESSENTIAL HYPERTENSION: ICD-10-CM

## 2018-10-19 PROCEDURE — 83540 ASSAY OF IRON: CPT

## 2018-10-19 PROCEDURE — 80061 LIPID PANEL: CPT

## 2018-10-19 PROCEDURE — 82728 ASSAY OF FERRITIN: CPT

## 2018-10-19 PROCEDURE — 84443 ASSAY THYROID STIM HORMONE: CPT

## 2018-10-19 PROCEDURE — 82306 VITAMIN D 25 HYDROXY: CPT

## 2018-10-19 PROCEDURE — 85025 COMPLETE CBC W/AUTO DIFF WBC: CPT

## 2018-10-19 PROCEDURE — 80053 COMPREHEN METABOLIC PANEL: CPT

## 2018-10-19 PROCEDURE — 36415 COLL VENOUS BLD VENIPUNCTURE: CPT

## 2018-10-19 PROCEDURE — 83550 IRON BINDING TEST: CPT

## 2018-10-19 PROCEDURE — 81003 URINALYSIS AUTO W/O SCOPE: CPT

## 2018-10-23 ENCOUNTER — TELEPHONE (OUTPATIENT)
Dept: FAMILY MEDICINE CLINIC | Facility: CLINIC | Age: 73
End: 2018-10-23

## 2018-10-23 DIAGNOSIS — E55.9 VITAMIN D DEFICIENCY: Primary | ICD-10-CM

## 2018-10-23 DIAGNOSIS — E78.2 HYPERLIPIDEMIA, MIXED: Primary | ICD-10-CM

## 2018-10-23 RX ORDER — ERGOCALCIFEROL 1.25 MG/1
50000 CAPSULE ORAL WEEKLY
Qty: 12 CAPSULE | Refills: 0 | Status: SHIPPED | OUTPATIENT
Start: 2018-10-23 | End: 2019-04-05 | Stop reason: ALTCHOICE

## 2018-10-23 RX ORDER — ROSUVASTATIN CALCIUM 5 MG/1
5 TABLET, COATED ORAL NIGHTLY
Qty: 30 TABLET | Refills: 2 | Status: SHIPPED | OUTPATIENT
Start: 2018-10-23 | End: 2019-01-19

## 2018-10-30 ENCOUNTER — HOSPITAL ENCOUNTER (OUTPATIENT)
Dept: BONE DENSITY | Age: 73
Discharge: HOME | End: 2018-10-30
Attending: INTERNAL MEDICINE
Payer: MEDICARE

## 2018-10-30 DIAGNOSIS — Z78.0 ASYMPTOMATIC MENOPAUSAL STATE: ICD-10-CM

## 2018-10-30 PROCEDURE — 77080 DXA BONE DENSITY AXIAL: CPT | Performed by: INTERNAL MEDICINE

## 2018-11-05 RX ORDER — CELECOXIB 200 MG/1
CAPSULE ORAL
Qty: 90 CAPSULE | Refills: 3 | Status: SHIPPED | OUTPATIENT
Start: 2018-11-05 | End: 2019-05-30

## 2018-11-13 ENCOUNTER — HOSPITAL ENCOUNTER (OUTPATIENT)
Dept: MAMMOGRAPHY | Facility: HOSPITAL | Age: 73
Discharge: HOME | End: 2018-11-13
Attending: INTERNAL MEDICINE
Payer: MEDICARE

## 2018-11-13 DIAGNOSIS — Z85.3 HISTORY OF BREAST CANCER: ICD-10-CM

## 2018-11-13 PROCEDURE — 77062 BREAST TOMOSYNTHESIS BI: CPT | Performed by: INTERNAL MEDICINE

## 2018-11-13 PROCEDURE — 77066 DX MAMMO INCL CAD BI: CPT | Performed by: INTERNAL MEDICINE

## 2018-12-04 ENCOUNTER — OFFICE VISIT (OUTPATIENT)
Dept: HEMATOLOGY/ONCOLOGY | Facility: HOSPITAL | Age: 73
End: 2018-12-04
Attending: INTERNAL MEDICINE
Payer: MEDICARE

## 2018-12-04 VITALS
RESPIRATION RATE: 18 BRPM | TEMPERATURE: 97 F | SYSTOLIC BLOOD PRESSURE: 174 MMHG | DIASTOLIC BLOOD PRESSURE: 88 MMHG | HEART RATE: 58 BPM | OXYGEN SATURATION: 96 %

## 2018-12-04 DIAGNOSIS — D50.9 IRON DEFICIENCY ANEMIA, UNSPECIFIED IRON DEFICIENCY ANEMIA TYPE: Primary | ICD-10-CM

## 2018-12-04 DIAGNOSIS — Z17.0 MALIGNANT NEOPLASM OF UPPER-OUTER QUADRANT OF LEFT BREAST IN FEMALE, ESTROGEN RECEPTOR POSITIVE (HCC): ICD-10-CM

## 2018-12-04 DIAGNOSIS — C50.412 MALIGNANT NEOPLASM OF UPPER-OUTER QUADRANT OF LEFT BREAST IN FEMALE, ESTROGEN RECEPTOR POSITIVE (HCC): ICD-10-CM

## 2018-12-04 PROCEDURE — 99213 OFFICE O/P EST LOW 20 MIN: CPT | Performed by: INTERNAL MEDICINE

## 2018-12-04 RX ORDER — LOPERAMIDE HYDROCHLORIDE 2 MG/1
2 CAPSULE ORAL 4 TIMES DAILY PRN
COMMUNITY
End: 2019-04-05 | Stop reason: ALTCHOICE

## 2018-12-04 NOTE — PROGRESS NOTES
Patient is here for MD f/u for breast cancer. On Tamoxifen daily. Tolerating well. Patient had a mammogram on 11/13. Feeling well. No complaints.     Education Record    Learner:  Patient    Disease / Diagnosis:  Breast cancer     Barriers / Limitations:  N

## 2018-12-18 NOTE — PROGRESS NOTES
[facility]    PATIENT'S NAME: Emmanuelle Feldman   ATTENDING PHYSICIAN: Tavon Gómez M.D.    PATIENT ACCOUNT #: [de-identified] LOCATION: [address] [place] [address] RECORD #: [id number] YOB: 1945   DATE OF SERVICE: 12/04/2018       CAN inhibitors. PHYSICAL EXAMINATION:    GENERAL:  She is a well-developed, obese female in no acute distress. VITAL SIGNS:  Performance status is 0. Her weight is 253 pounds. Blood pressure is 174/88 today, but it has been normal in other settings.   Pu

## 2018-12-26 ENCOUNTER — HOSPITAL ENCOUNTER (OUTPATIENT)
Dept: CT IMAGING | Facility: HOSPITAL | Age: 73
Discharge: HOME | End: 2018-12-26
Attending: FAMILY MEDICINE

## 2018-12-26 DIAGNOSIS — Z13.9 ENCOUNTER FOR SCREENING: ICD-10-CM

## 2018-12-31 DIAGNOSIS — R93.1 ELEVATED CORONARY ARTERY CALCIUM SCORE: ICD-10-CM

## 2018-12-31 DIAGNOSIS — R93.89 ABNORMAL CT SCAN: Primary | ICD-10-CM

## 2019-01-14 RX ORDER — ERGOCALCIFEROL 1.25 MG/1
CAPSULE ORAL
Qty: 12 CAPSULE | Refills: 0 | OUTPATIENT
Start: 2019-01-14

## 2019-01-21 RX ORDER — ROSUVASTATIN CALCIUM 5 MG/1
TABLET, COATED ORAL
Qty: 30 TABLET | Refills: 2 | Status: SHIPPED | OUTPATIENT
Start: 2019-01-21 | End: 2019-04-10

## 2019-04-05 ENCOUNTER — OFFICE VISIT (OUTPATIENT)
Dept: FAMILY MEDICINE CLINIC | Facility: CLINIC | Age: 74
End: 2019-04-05
Payer: MEDICARE

## 2019-04-05 VITALS
SYSTOLIC BLOOD PRESSURE: 128 MMHG | HEIGHT: 67 IN | HEART RATE: 102 BPM | RESPIRATION RATE: 18 BRPM | TEMPERATURE: 97 F | WEIGHT: 253 LBS | BODY MASS INDEX: 39.71 KG/M2 | DIASTOLIC BLOOD PRESSURE: 68 MMHG

## 2019-04-05 DIAGNOSIS — R19.7 DIARRHEA, UNSPECIFIED TYPE: Primary | ICD-10-CM

## 2019-04-05 DIAGNOSIS — R14.0 ABDOMINAL BLOATING: ICD-10-CM

## 2019-04-05 DIAGNOSIS — E78.00 HYPERCHOLESTEROLEMIA: ICD-10-CM

## 2019-04-05 DIAGNOSIS — R93.1 ABNORMAL CT SCAN OF HEART: ICD-10-CM

## 2019-04-05 DIAGNOSIS — R10.9 ABDOMINAL CRAMPS: ICD-10-CM

## 2019-04-05 DIAGNOSIS — K59.00 CONSTIPATION, UNSPECIFIED CONSTIPATION TYPE: ICD-10-CM

## 2019-04-05 PROCEDURE — 99214 OFFICE O/P EST MOD 30 MIN: CPT | Performed by: FAMILY MEDICINE

## 2019-04-05 NOTE — PATIENT INSTRUCTIONS
Return for fasting blood work. Try Bentyl 1 tablet before meals and at bedtime this is for irritable bowel syndrome. Gas-X as needed for gas. Continue probiotic. Continue Imodium and Metamucil.   Try Lactaid milk and Lactaid pills with milk-containing p

## 2019-04-06 NOTE — PROGRESS NOTES
Radha Gregg is a 74year old female. cc abdominal bloating, diarrhea, constipation, hypercholesterolemia, abnormal heart scan  HPI:   Patient is come to the office to discuss her stomach problems. She was diagnosed with iron deficiency anemia. CAPSULE(60 MG) BY MOUTH EVERY DAY Disp: 90 capsule Rfl: 1   TAMOXIFEN CITRATE 20 MG Oral Tab TAKE 1 TABLET(20 MG) BY MOUTH DAILY Disp: 90 tablet Rfl: 3   Cholecalciferol (VITAMIN D) 1000 units Oral Tab Take by mouth.  Daily except when she takes weekly 50,0 ASSESSMENT AND PLAN:     Diarrhea, unspecified type  (primary encounter diagnosis)  Constipation, unspecified constipation type  Abdominal bloating  Abdominal cramps  Abnormal ct scan of heart  Hypercholesterolemia    No orders of the defined types wer

## 2019-04-08 ENCOUNTER — LAB ENCOUNTER (OUTPATIENT)
Dept: LAB | Age: 74
End: 2019-04-08
Attending: FAMILY MEDICINE
Payer: MEDICARE

## 2019-04-08 DIAGNOSIS — D50.9 IRON DEFICIENCY ANEMIA, UNSPECIFIED IRON DEFICIENCY ANEMIA TYPE: ICD-10-CM

## 2019-04-08 DIAGNOSIS — E55.9 VITAMIN D DEFICIENCY: ICD-10-CM

## 2019-04-08 DIAGNOSIS — E78.2 HYPERLIPIDEMIA, MIXED: ICD-10-CM

## 2019-04-08 PROCEDURE — 82728 ASSAY OF FERRITIN: CPT

## 2019-04-08 PROCEDURE — 83540 ASSAY OF IRON: CPT

## 2019-04-08 PROCEDURE — 80061 LIPID PANEL: CPT

## 2019-04-08 PROCEDURE — 85025 COMPLETE CBC W/AUTO DIFF WBC: CPT

## 2019-04-08 PROCEDURE — 82306 VITAMIN D 25 HYDROXY: CPT

## 2019-04-08 PROCEDURE — 36415 COLL VENOUS BLD VENIPUNCTURE: CPT

## 2019-04-08 PROCEDURE — 83550 IRON BINDING TEST: CPT

## 2019-04-08 PROCEDURE — 80053 COMPREHEN METABOLIC PANEL: CPT

## 2019-04-10 DIAGNOSIS — E55.9 VITAMIN D DEFICIENCY: Primary | ICD-10-CM

## 2019-04-10 RX ORDER — ROSUVASTATIN CALCIUM 5 MG/1
TABLET, COATED ORAL
Qty: 30 TABLET | Refills: 5 | Status: SHIPPED | OUTPATIENT
Start: 2019-04-10 | End: 2019-10-13

## 2019-04-10 RX ORDER — ERGOCALCIFEROL 1.25 MG/1
50000 CAPSULE ORAL WEEKLY
Qty: 12 CAPSULE | Refills: 0 | Status: SHIPPED | OUTPATIENT
Start: 2019-04-10 | End: 2019-10-21 | Stop reason: ALTCHOICE

## 2019-04-16 DIAGNOSIS — F32.A DEPRESSION, UNSPECIFIED DEPRESSION TYPE: ICD-10-CM

## 2019-04-17 ENCOUNTER — OFFICE VISIT (OUTPATIENT)
Dept: HEMATOLOGY/ONCOLOGY | Facility: HOSPITAL | Age: 74
End: 2019-04-17
Attending: INTERNAL MEDICINE
Payer: MEDICARE

## 2019-04-17 VITALS
TEMPERATURE: 98 F | DIASTOLIC BLOOD PRESSURE: 83 MMHG | RESPIRATION RATE: 18 BRPM | OXYGEN SATURATION: 96 % | HEART RATE: 91 BPM | SYSTOLIC BLOOD PRESSURE: 139 MMHG

## 2019-04-17 DIAGNOSIS — D50.0 IRON DEFICIENCY ANEMIA DUE TO CHRONIC BLOOD LOSS: Primary | ICD-10-CM

## 2019-04-17 PROCEDURE — 96365 THER/PROPH/DIAG IV INF INIT: CPT

## 2019-04-17 RX ORDER — DULOXETIN HYDROCHLORIDE 60 MG/1
CAPSULE, DELAYED RELEASE ORAL
Qty: 90 CAPSULE | Refills: 0 | Status: SHIPPED | OUTPATIENT
Start: 2019-04-17 | End: 2019-07-15

## 2019-04-17 NOTE — TELEPHONE ENCOUNTER
DULOXETINE DR 60MG CAPSULES    Non protocol medication. Please see pended medications. Please sign & print if appropriate. Thank you    Her last OV was on 04/05/2019.

## 2019-04-17 NOTE — PROGRESS NOTES
Education Record    Learner:  Patient    Disease / Diagnosis: iron deficiency anemia    Barriers / Limitations:  None   Comments:    Method:  Discussion   Comments:    General Topics:  Medication and Plan of care reviewed   Comments:    Outcome:  Shows und

## 2019-04-22 ENCOUNTER — OFFICE VISIT (OUTPATIENT)
Dept: CARDIOLOGY | Age: 74
End: 2019-04-22

## 2019-04-22 VITALS
SYSTOLIC BLOOD PRESSURE: 126 MMHG | WEIGHT: 250 LBS | DIASTOLIC BLOOD PRESSURE: 64 MMHG | HEIGHT: 66 IN | HEART RATE: 110 BPM | BODY MASS INDEX: 40.18 KG/M2

## 2019-04-22 DIAGNOSIS — K44.9 HIATAL HERNIA: ICD-10-CM

## 2019-04-22 DIAGNOSIS — E78.00 HYPERCHOLESTEREMIA: ICD-10-CM

## 2019-04-22 DIAGNOSIS — R00.0 TACHYCARDIA: Primary | ICD-10-CM

## 2019-04-22 DIAGNOSIS — E78.1 HYPERTRIGLYCERIDEMIA: ICD-10-CM

## 2019-04-22 DIAGNOSIS — R93.1 AGATSTON CORONARY ARTERY CALCIUM SCORE GREATER THAN 400: ICD-10-CM

## 2019-04-22 DIAGNOSIS — R06.02 SHORTNESS OF BREATH: ICD-10-CM

## 2019-04-22 DIAGNOSIS — E66.01 OBESITY, CLASS III, BMI 40-49.9 (MORBID OBESITY) (CMD): ICD-10-CM

## 2019-04-22 DIAGNOSIS — I10 ESSENTIAL HYPERTENSION: ICD-10-CM

## 2019-04-22 DIAGNOSIS — I25.10 CORONARY ARTERY DISEASE INVOLVING NATIVE CORONARY ARTERY OF NATIVE HEART WITHOUT ANGINA PECTORIS: ICD-10-CM

## 2019-04-22 DIAGNOSIS — R94.31 ABNORMAL EKG: ICD-10-CM

## 2019-04-22 DIAGNOSIS — R53.83 FATIGUE, UNSPECIFIED TYPE: ICD-10-CM

## 2019-04-22 PROCEDURE — 99205 OFFICE O/P NEW HI 60 MIN: CPT | Performed by: INTERNAL MEDICINE

## 2019-04-22 PROCEDURE — 93000 ELECTROCARDIOGRAM COMPLETE: CPT | Performed by: INTERNAL MEDICINE

## 2019-04-22 RX ORDER — AMLODIPINE BESYLATE 5 MG/1
5 TABLET ORAL DAILY
COMMUNITY

## 2019-04-22 RX ORDER — CELECOXIB 200 MG/1
200 CAPSULE ORAL DAILY
COMMUNITY

## 2019-04-22 RX ORDER — TAMOXIFEN CITRATE 20 MG/1
20 TABLET ORAL DAILY
COMMUNITY

## 2019-04-22 RX ORDER — DULOXETIN HYDROCHLORIDE 60 MG/1
60 CAPSULE, DELAYED RELEASE ORAL DAILY
COMMUNITY

## 2019-04-22 RX ORDER — BENAZEPRIL HYDROCHLORIDE 20 MG/1
20 TABLET ORAL DAILY
COMMUNITY

## 2019-04-22 RX ORDER — ROSUVASTATIN CALCIUM 5 MG/1
5 TABLET, COATED ORAL DAILY
COMMUNITY

## 2019-04-26 ENCOUNTER — LAB ENCOUNTER (OUTPATIENT)
Dept: LAB | Age: 74
End: 2019-04-26
Attending: INTERNAL MEDICINE
Payer: MEDICARE

## 2019-04-26 DIAGNOSIS — I25.10 CORONARY ARTERY DISEASE: ICD-10-CM

## 2019-04-26 DIAGNOSIS — E78.1 HYPERTRIGLYCERIDEMIA: ICD-10-CM

## 2019-04-26 DIAGNOSIS — R00.0 TACHYCARDIA: Primary | ICD-10-CM

## 2019-04-26 DIAGNOSIS — R93.1 AGATSTON CORONARY ARTERY CALCIUM SCORE GREATER THAN 400: ICD-10-CM

## 2019-04-26 DIAGNOSIS — R06.02 SHORTNESS OF BREATH: ICD-10-CM

## 2019-04-26 DIAGNOSIS — R53.83 FATIGUE: ICD-10-CM

## 2019-04-26 DIAGNOSIS — I10 ESSENTIAL HYPERTENSION: ICD-10-CM

## 2019-04-26 PROCEDURE — 84443 ASSAY THYROID STIM HORMONE: CPT

## 2019-04-26 PROCEDURE — 83880 ASSAY OF NATRIURETIC PEPTIDE: CPT

## 2019-04-26 PROCEDURE — 36415 COLL VENOUS BLD VENIPUNCTURE: CPT

## 2019-05-07 ENCOUNTER — TELEPHONE (OUTPATIENT)
Dept: CARDIOLOGY | Age: 74
End: 2019-05-07

## 2019-05-09 ENCOUNTER — TELEPHONE (OUTPATIENT)
Dept: CARDIOLOGY | Age: 74
End: 2019-05-09

## 2019-05-09 ENCOUNTER — HOSPITAL ENCOUNTER (OUTPATIENT)
Dept: CV DIAGNOSTICS | Facility: HOSPITAL | Age: 74
Discharge: HOME | End: 2019-05-09
Attending: INTERNAL MEDICINE
Payer: MEDICARE

## 2019-05-09 DIAGNOSIS — R94.31 ABNORMAL EKG: ICD-10-CM

## 2019-05-09 DIAGNOSIS — R06.02 SOB (SHORTNESS OF BREATH): ICD-10-CM

## 2019-05-09 DIAGNOSIS — R93.1 AGATSTON CAC SCORE, >400: ICD-10-CM

## 2019-05-09 DIAGNOSIS — E78.00 PURE HYPERCHOLESTEROLEMIA: ICD-10-CM

## 2019-05-09 DIAGNOSIS — I10 ESSENTIAL HYPERTENSION, BENIGN: ICD-10-CM

## 2019-05-09 PROCEDURE — 78452 HT MUSCLE IMAGE SPECT MULT: CPT | Performed by: INTERNAL MEDICINE

## 2019-05-09 PROCEDURE — 93017 CV STRESS TEST TRACING ONLY: CPT | Performed by: INTERNAL MEDICINE

## 2019-05-09 PROCEDURE — 93018 CV STRESS TEST I&R ONLY: CPT | Performed by: INTERNAL MEDICINE

## 2019-05-09 NOTE — PROGRESS NOTES
Spoke with Angelina Nicholson RN in Dr David Kamara office regarding irreversible moderate sized apical-lateral defect on Lexiscan nuclear stress test per Nuc tech. No ST changes noted. Pt denied cardiac symptoms.  Dr. Brandy Oneal office will follow up with pt regarding test re

## 2019-05-15 ENCOUNTER — TELEPHONE (OUTPATIENT)
Dept: CARDIOLOGY | Age: 74
End: 2019-05-15

## 2019-05-15 DIAGNOSIS — R94.39 ABNORMAL NUCLEAR STRESS TEST: Primary | ICD-10-CM

## 2019-05-15 DIAGNOSIS — Z01.812 PRE-PROCEDURE LAB EXAM: ICD-10-CM

## 2019-05-16 ENCOUNTER — TELEPHONE (OUTPATIENT)
Dept: CARDIOLOGY | Age: 74
End: 2019-05-16

## 2019-05-16 DIAGNOSIS — R94.39 ABNORMAL STRESS TEST: Primary | ICD-10-CM

## 2019-05-20 ENCOUNTER — LAB ENCOUNTER (OUTPATIENT)
Dept: LAB | Age: 74
End: 2019-05-20
Attending: INTERNAL MEDICINE
Payer: MEDICARE

## 2019-05-20 ENCOUNTER — HOSPITAL ENCOUNTER (OUTPATIENT)
Dept: GENERAL RADIOLOGY | Age: 74
Discharge: HOME | End: 2019-05-20
Attending: INTERNAL MEDICINE
Payer: MEDICARE

## 2019-05-20 DIAGNOSIS — Z01.812 PRE-PROCEDURE LAB EXAM: ICD-10-CM

## 2019-05-20 DIAGNOSIS — R94.39 ABNORMAL NUCLEAR STRESS TEST: Primary | ICD-10-CM

## 2019-05-20 DIAGNOSIS — R94.39 ABNORMAL NUCLEAR STRESS TEST: ICD-10-CM

## 2019-05-20 PROCEDURE — 80048 BASIC METABOLIC PNL TOTAL CA: CPT

## 2019-05-20 PROCEDURE — 71046 X-RAY EXAM CHEST 2 VIEWS: CPT | Performed by: INTERNAL MEDICINE

## 2019-05-20 PROCEDURE — 85025 COMPLETE CBC W/AUTO DIFF WBC: CPT

## 2019-05-20 PROCEDURE — 36415 COLL VENOUS BLD VENIPUNCTURE: CPT

## 2019-05-24 RX ORDER — ASPIRIN 81 MG/1
324 TABLET ORAL ONCE
Status: ON HOLD | COMMUNITY
End: 2019-05-29

## 2019-05-24 NOTE — HISTORICAL OFFICE NOTE
Marquise Johnson MD - 04/22/2019 2:30 PM CDT  Formatting of this note might be different from the original.  Subjective   Patient ID: Ms. Renetta Saab is a pleasant 74year old lady    Chief Complaint   Patient presents with   • Follow-up   abnormal ultraf file   Minutes per session: Not on file   • Stress: Not on file   Relationships   • Social connections:   Talks on phone: Not on file   Gets together: Not on file   Attends Shinto service: Not on file   Active member of club or organization: Not on file affect    1. Tachycardia   2. Coronary artery disease involving native coronary artery of native heart without angina pectoris   3. Hypercholesteremia   4. Agatston coronary artery calcium score greater than 400   5. Shortness of breath   6.  Hiatal hernia

## 2019-05-29 ENCOUNTER — HOSPITAL ENCOUNTER (OUTPATIENT)
Dept: INTERVENTIONAL RADIOLOGY/VASCULAR | Facility: HOSPITAL | Age: 74
Discharge: HOME | End: 2019-05-29
Attending: INTERNAL MEDICINE | Admitting: INTERNAL MEDICINE
Payer: MEDICARE

## 2019-05-29 VITALS
WEIGHT: 250 LBS | BODY MASS INDEX: 40.18 KG/M2 | DIASTOLIC BLOOD PRESSURE: 73 MMHG | SYSTOLIC BLOOD PRESSURE: 120 MMHG | RESPIRATION RATE: 18 BRPM | OXYGEN SATURATION: 95 % | HEIGHT: 66 IN | HEART RATE: 109 BPM | TEMPERATURE: 97 F

## 2019-05-29 DIAGNOSIS — R94.39 ABNORMAL STRESS TEST: ICD-10-CM

## 2019-05-29 PROCEDURE — B2111ZZ FLUOROSCOPY OF MULTIPLE CORONARY ARTERIES USING LOW OSMOLAR CONTRAST: ICD-10-PCS | Performed by: INTERNAL MEDICINE

## 2019-05-29 PROCEDURE — 4A023N7 MEASUREMENT OF CARDIAC SAMPLING AND PRESSURE, LEFT HEART, PERCUTANEOUS APPROACH: ICD-10-PCS | Performed by: INTERNAL MEDICINE

## 2019-05-29 PROCEDURE — B2151ZZ FLUOROSCOPY OF LEFT HEART USING LOW OSMOLAR CONTRAST: ICD-10-PCS | Performed by: INTERNAL MEDICINE

## 2019-05-29 PROCEDURE — 99152 MOD SED SAME PHYS/QHP 5/>YRS: CPT

## 2019-05-29 PROCEDURE — 93458 L HRT ARTERY/VENTRICLE ANGIO: CPT

## 2019-05-29 RX ORDER — VERAPAMIL HYDROCHLORIDE 2.5 MG/ML
INJECTION, SOLUTION INTRAVENOUS
Status: COMPLETED
Start: 2019-05-29 | End: 2019-05-29

## 2019-05-29 RX ORDER — ASPIRIN 81 MG/1
324 TABLET, CHEWABLE ORAL ONCE
Status: DISCONTINUED | OUTPATIENT
Start: 2019-05-29 | End: 2019-05-29 | Stop reason: HOSPADM

## 2019-05-29 RX ORDER — HEPARIN SODIUM 5000 [USP'U]/ML
INJECTION, SOLUTION INTRAVENOUS; SUBCUTANEOUS
Status: COMPLETED
Start: 2019-05-29 | End: 2019-05-29

## 2019-05-29 RX ORDER — MIDAZOLAM HYDROCHLORIDE 1 MG/ML
INJECTION INTRAMUSCULAR; INTRAVENOUS
Status: COMPLETED
Start: 2019-05-29 | End: 2019-05-29

## 2019-05-29 RX ORDER — SODIUM CHLORIDE 9 MG/ML
INJECTION, SOLUTION INTRAVENOUS CONTINUOUS
Status: DISCONTINUED | OUTPATIENT
Start: 2019-05-29 | End: 2019-05-29

## 2019-05-29 RX ORDER — LIDOCAINE HYDROCHLORIDE 10 MG/ML
INJECTION, SOLUTION EPIDURAL; INFILTRATION; INTRACAUDAL; PERINEURAL
Status: COMPLETED
Start: 2019-05-29 | End: 2019-05-29

## 2019-05-29 RX ADMIN — SODIUM CHLORIDE: 9 INJECTION, SOLUTION INTRAVENOUS at 11:31:00

## 2019-05-29 RX ADMIN — SODIUM CHLORIDE: 9 INJECTION, SOLUTION INTRAVENOUS at 14:45:00

## 2019-05-29 NOTE — PROCEDURES
[facility]  Angio via Radial Artery Procedure Note    Eleanor Haas Location: Cath Lab    CSN [id number] MRN [id number]   Admission Date 5/29/2019 Procedure Date 5/29/2019   Attending Physician Edgar Blanchard MD Procedure Physician Mahsa Gan significant disease  2. The left main coronary artery is no significant disease  3. The circumflex has no significant disease  4. There is a 70 to 80% stenosis in the first diagonal branch. Impression:  1. Normal left ventricular function  2.   Signi

## 2019-05-29 NOTE — H&P
History & Physical Examination    Patient Name: Antonietta Jones  MRN: [id number]  CSN: [id number]  YOB: 1945    Diagnosis: Coronary artery disease, abnormal stress test    Present Illness: Cardiac catheterization      Medications Prior of antineoplastic chemotherapy    • Shortness of breath    • Unspecified essential hypertension      Past Surgical History:   Procedure Laterality Date   • BACK SURGERY  1970    slipped disc   • CAPSULE ENDOSCOPY - INTERNAL REFERRAL  1/24/18= Normal   • [facility] within the last 30 days. Any changes noted above.     Cordelia Rahman MD  5/29/2019  1:26 PM

## 2019-05-29 NOTE — PROGRESS NOTES
Pt s/p LHC with Dr. Humphrey Valenzuela. Pt recovered in holding. Pt arrived with right wrist vasc band 8cc of air. Right wrist no bleeding or hematoma. +radial pulse.  After an hour in recovery air was gradually removed from the vasc band until all the air was removed

## 2019-05-30 DIAGNOSIS — Z17.0 MALIGNANT NEOPLASM OF RIGHT BREAST IN FEMALE, ESTROGEN RECEPTOR POSITIVE, UNSPECIFIED SITE OF BREAST (HCC): Primary | ICD-10-CM

## 2019-05-30 DIAGNOSIS — C50.911 MALIGNANT NEOPLASM OF RIGHT BREAST IN FEMALE, ESTROGEN RECEPTOR POSITIVE, UNSPECIFIED SITE OF BREAST (HCC): Primary | ICD-10-CM

## 2019-05-30 RX ORDER — CELECOXIB 200 MG/1
CAPSULE ORAL
Qty: 90 CAPSULE | Refills: 0 | Status: SHIPPED | OUTPATIENT
Start: 2019-05-30 | End: 2019-09-07

## 2019-06-04 ENCOUNTER — TELEPHONE (OUTPATIENT)
Dept: FAMILY MEDICINE CLINIC | Facility: CLINIC | Age: 74
End: 2019-06-04

## 2019-06-12 ENCOUNTER — TELEPHONE (OUTPATIENT)
Dept: CARDIOLOGY | Age: 74
End: 2019-06-12

## 2019-07-01 ENCOUNTER — OFFICE VISIT (OUTPATIENT)
Dept: CARDIOLOGY | Age: 74
End: 2019-07-01

## 2019-07-01 VITALS
HEART RATE: 112 BPM | DIASTOLIC BLOOD PRESSURE: 60 MMHG | SYSTOLIC BLOOD PRESSURE: 130 MMHG | HEIGHT: 66 IN | BODY MASS INDEX: 40.35 KG/M2

## 2019-07-01 DIAGNOSIS — R93.1 AGATSTON CORONARY ARTERY CALCIUM SCORE GREATER THAN 400: ICD-10-CM

## 2019-07-01 DIAGNOSIS — E78.1 HYPERTRIGLYCERIDEMIA: ICD-10-CM

## 2019-07-01 DIAGNOSIS — R06.02 SHORTNESS OF BREATH: Primary | ICD-10-CM

## 2019-07-01 DIAGNOSIS — K44.9 HIATAL HERNIA: ICD-10-CM

## 2019-07-01 DIAGNOSIS — E78.00 HYPERCHOLESTEREMIA: ICD-10-CM

## 2019-07-01 DIAGNOSIS — E66.01 OBESITY, CLASS III, BMI 40-49.9 (MORBID OBESITY) (CMD): ICD-10-CM

## 2019-07-01 DIAGNOSIS — R00.0 TACHYCARDIA: ICD-10-CM

## 2019-07-01 DIAGNOSIS — I10 ESSENTIAL HYPERTENSION: ICD-10-CM

## 2019-07-01 DIAGNOSIS — I25.10 CORONARY ARTERY DISEASE INVOLVING NATIVE CORONARY ARTERY OF NATIVE HEART WITHOUT ANGINA PECTORIS: ICD-10-CM

## 2019-07-01 PROCEDURE — 99215 OFFICE O/P EST HI 40 MIN: CPT | Performed by: INTERNAL MEDICINE

## 2019-07-01 RX ORDER — ERGOCALCIFEROL 1.25 MG/1
50000 CAPSULE ORAL
COMMUNITY
Start: 2019-04-10 | End: 2020-04-21

## 2019-07-01 SDOH — HEALTH STABILITY: PHYSICAL HEALTH: ON AVERAGE, HOW MANY DAYS PER WEEK DO YOU ENGAGE IN MODERATE TO STRENUOUS EXERCISE (LIKE A BRISK WALK)?: 0 DAYS

## 2019-07-01 SDOH — HEALTH STABILITY: PHYSICAL HEALTH: ON AVERAGE, HOW MANY MINUTES DO YOU ENGAGE IN EXERCISE AT THIS LEVEL?: 0 MIN

## 2019-07-03 ENCOUNTER — MED REC SCAN ONLY (OUTPATIENT)
Dept: FAMILY MEDICINE CLINIC | Facility: CLINIC | Age: 74
End: 2019-07-03

## 2019-07-09 ENCOUNTER — OFFICE VISIT (OUTPATIENT)
Dept: HEMATOLOGY/ONCOLOGY | Facility: HOSPITAL | Age: 74
End: 2019-07-09
Attending: INTERNAL MEDICINE
Payer: MEDICARE

## 2019-07-09 VITALS
TEMPERATURE: 99 F | OXYGEN SATURATION: 95 % | RESPIRATION RATE: 20 BRPM | SYSTOLIC BLOOD PRESSURE: 111 MMHG | DIASTOLIC BLOOD PRESSURE: 76 MMHG | HEART RATE: 116 BPM

## 2019-07-09 DIAGNOSIS — Z17.0 MALIGNANT NEOPLASM OF RIGHT BREAST IN FEMALE, ESTROGEN RECEPTOR POSITIVE, UNSPECIFIED SITE OF BREAST (HCC): ICD-10-CM

## 2019-07-09 DIAGNOSIS — Z85.3 HISTORY OF BREAST CANCER: Primary | ICD-10-CM

## 2019-07-09 DIAGNOSIS — D50.8 OTHER IRON DEFICIENCY ANEMIA: ICD-10-CM

## 2019-07-09 DIAGNOSIS — C50.911 MALIGNANT NEOPLASM OF RIGHT BREAST IN FEMALE, ESTROGEN RECEPTOR POSITIVE, UNSPECIFIED SITE OF BREAST (HCC): ICD-10-CM

## 2019-07-09 PROCEDURE — 99214 OFFICE O/P EST MOD 30 MIN: CPT | Performed by: INTERNAL MEDICINE

## 2019-07-09 NOTE — PROGRESS NOTES
Patient is here for MD f/u for breast cancer. Patient is on Tamoxifen and is tolerating well. Last mammogram was on 11/13/18. She had an angiogram and stress test on 5/29. Feeling well. No complaints. Mild fatigue.      Education Record    Learner:  Patient

## 2019-07-15 DIAGNOSIS — F32.A DEPRESSION, UNSPECIFIED DEPRESSION TYPE: ICD-10-CM

## 2019-07-15 RX ORDER — AMLODIPINE BESYLATE 5 MG/1
TABLET ORAL
Qty: 90 TABLET | Refills: 0 | Status: SHIPPED | OUTPATIENT
Start: 2019-07-15 | End: 2019-10-13

## 2019-07-15 RX ORDER — DULOXETIN HYDROCHLORIDE 60 MG/1
CAPSULE, DELAYED RELEASE ORAL
Qty: 90 CAPSULE | Refills: 0 | Status: SHIPPED | OUTPATIENT
Start: 2019-07-15 | End: 2019-10-13

## 2019-07-15 RX ORDER — TAMOXIFEN CITRATE 20 MG/1
TABLET ORAL
Qty: 90 TABLET | Refills: 3 | Status: SHIPPED | OUTPATIENT
Start: 2019-07-15 | End: 2020-07-02

## 2019-07-15 RX ORDER — BENAZEPRIL HYDROCHLORIDE 20 MG/1
TABLET ORAL
Qty: 90 TABLET | Refills: 0 | Status: SHIPPED | OUTPATIENT
Start: 2019-07-15 | End: 2019-10-13

## 2019-07-16 NOTE — PROGRESS NOTES
[facility]    PATIENT'S NAME: Weston Nunez   ATTENDING PHYSICIAN: Grace Johnson M.D.    PATIENT ACCOUNT #: [de-identified] LOCATION: [address] [place] [address] RECORD #: [id number] YOB: 1945   DATE OF SERVICE: 07/09/2019       CAN She is a well-developed, well-nourished female in no acute distress. VITAL SIGNS:  Her performance status is 0. Her weight is 250 pounds. Blood pressure is 111/76, pulse 116, respiratory rate is 20, temperature is 99.4. HEENT:  Unremarkable.   She ha

## 2019-08-27 ENCOUNTER — TELEPHONE (OUTPATIENT)
Dept: CARDIOLOGY | Age: 74
End: 2019-08-27

## 2019-09-07 DIAGNOSIS — C50.911 MALIGNANT NEOPLASM OF RIGHT BREAST IN FEMALE, ESTROGEN RECEPTOR POSITIVE, UNSPECIFIED SITE OF BREAST: ICD-10-CM

## 2019-09-07 DIAGNOSIS — Z17.0 MALIGNANT NEOPLASM OF RIGHT BREAST IN FEMALE, ESTROGEN RECEPTOR POSITIVE, UNSPECIFIED SITE OF BREAST: ICD-10-CM

## 2019-09-09 RX ORDER — CELECOXIB 200 MG/1
CAPSULE ORAL
Qty: 90 CAPSULE | Refills: 0 | Status: SHIPPED | OUTPATIENT
Start: 2019-09-09 | End: 2019-12-08

## 2019-10-13 DIAGNOSIS — F32.A DEPRESSION, UNSPECIFIED DEPRESSION TYPE: ICD-10-CM

## 2019-10-14 RX ORDER — ROSUVASTATIN CALCIUM 5 MG/1
TABLET, COATED ORAL
Qty: 90 TABLET | Refills: 0 | Status: SHIPPED | OUTPATIENT
Start: 2019-10-14 | End: 2020-01-13

## 2019-10-14 RX ORDER — DULOXETIN HYDROCHLORIDE 60 MG/1
CAPSULE, DELAYED RELEASE ORAL
Qty: 90 CAPSULE | Refills: 0 | Status: SHIPPED | OUTPATIENT
Start: 2019-10-14 | End: 2020-01-13

## 2019-10-14 RX ORDER — AMLODIPINE BESYLATE 5 MG/1
TABLET ORAL
Qty: 90 TABLET | Refills: 0 | Status: SHIPPED | OUTPATIENT
Start: 2019-10-14 | End: 2019-10-21

## 2019-10-14 RX ORDER — BENAZEPRIL HYDROCHLORIDE 20 MG/1
TABLET ORAL
Qty: 90 TABLET | Refills: 0 | Status: SHIPPED | OUTPATIENT
Start: 2019-10-14 | End: 2019-10-21

## 2019-10-21 ENCOUNTER — OFFICE VISIT (OUTPATIENT)
Dept: FAMILY MEDICINE CLINIC | Facility: CLINIC | Age: 74
End: 2019-10-21
Payer: MEDICARE

## 2019-10-21 VITALS
WEIGHT: 250 LBS | HEIGHT: 66 IN | HEART RATE: 98 BPM | TEMPERATURE: 98 F | BODY MASS INDEX: 40.18 KG/M2 | DIASTOLIC BLOOD PRESSURE: 78 MMHG | RESPIRATION RATE: 30 BRPM | OXYGEN SATURATION: 94 % | SYSTOLIC BLOOD PRESSURE: 116 MMHG

## 2019-10-21 DIAGNOSIS — E78.00 HYPERCHOLESTEROLEMIA: ICD-10-CM

## 2019-10-21 DIAGNOSIS — E66.01 OBESITY, MORBID (HCC): ICD-10-CM

## 2019-10-21 DIAGNOSIS — F41.9 ANXIETY: ICD-10-CM

## 2019-10-21 DIAGNOSIS — Z23 NEED FOR VACCINATION: ICD-10-CM

## 2019-10-21 DIAGNOSIS — C50.912 BREAST CARCINOMA, FEMALE, LEFT (HCC): ICD-10-CM

## 2019-10-21 DIAGNOSIS — K44.9 HIATAL HERNIA: ICD-10-CM

## 2019-10-21 DIAGNOSIS — I10 ESSENTIAL HYPERTENSION: ICD-10-CM

## 2019-10-21 DIAGNOSIS — R06.02 SHORTNESS OF BREATH: ICD-10-CM

## 2019-10-21 DIAGNOSIS — Z00.00 MEDICARE ANNUAL WELLNESS VISIT, SUBSEQUENT: Primary | ICD-10-CM

## 2019-10-21 DIAGNOSIS — D50.9 IRON DEFICIENCY ANEMIA, UNSPECIFIED IRON DEFICIENCY ANEMIA TYPE: ICD-10-CM

## 2019-10-21 DIAGNOSIS — E78.2 HYPERLIPIDEMIA, MIXED: ICD-10-CM

## 2019-10-21 DIAGNOSIS — H91.93 DECREASED HEARING OF BOTH EARS: ICD-10-CM

## 2019-10-21 PROCEDURE — G0444 DEPRESSION SCREEN ANNUAL: HCPCS | Performed by: FAMILY MEDICINE

## 2019-10-21 PROCEDURE — G0009 ADMIN PNEUMOCOCCAL VACCINE: HCPCS | Performed by: FAMILY MEDICINE

## 2019-10-21 PROCEDURE — G0439 PPPS, SUBSEQ VISIT: HCPCS | Performed by: FAMILY MEDICINE

## 2019-10-21 PROCEDURE — 90662 IIV NO PRSV INCREASED AG IM: CPT | Performed by: FAMILY MEDICINE

## 2019-10-21 PROCEDURE — 99214 OFFICE O/P EST MOD 30 MIN: CPT | Performed by: FAMILY MEDICINE

## 2019-10-21 PROCEDURE — G0008 ADMIN INFLUENZA VIRUS VAC: HCPCS | Performed by: FAMILY MEDICINE

## 2019-10-21 PROCEDURE — 90732 PPSV23 VACC 2 YRS+ SUBQ/IM: CPT | Performed by: FAMILY MEDICINE

## 2019-10-21 NOTE — PATIENT INSTRUCTIONS
Shingrix- adanyamiletmoses shot. Call [phone] to schedule CT chest for evaluation of the shortness of breath. Return for fasting blood work. Call ENT Dr. Savannah Willard for evaluation of hearing problem. Continue current medications. Healthy diet.   Follow-up covered service except at the Welcome to Medicare Visit    Abdominal aortic aneurysm screening (once between ages 73-68) IPPE only No results found for this or any previous visit.  Limited to patients who meet one of the following criteria:   • Men who are CHLAMYDIA No flowsheet data found. Screening Mammogram      Mammogram    Recommend Annually to at least age 76, and as needed after 76 There are no preventive care reminders to display for this patient.  Please get this Mammogram regularly   Immunization on it's website for anyone to review and print using their home computer and printer. (the forms are also available in [address])  [email or web address] [email or web address] [email or web address] org  This link also has information from the [facility] regarding Advance Directives.

## 2019-10-21 NOTE — PROGRESS NOTES
HPI:   Mary Ellen Quintana is a 66year old female who presents for a Medicare Subsequent Annual Wellness visit (Pt already had Initial Annual Wellness) also follow-up on hypertension hyperlipidemia anxiety/depression anemia obesity, decreased hearin Fall/Risk Assessment   She has been screened for Falls and is low risk: Fall/Risk Scorin    Cognitive Assessment   She had a completely normal cognitive assessment- see flowsheet entries    Functional Ability/Status   Fiona Griffith has a hemorrhoids     Hiatal hernia    Wt Readings from Last 3 Encounters:  10/21/19 : 250 lb (113.4 kg)  05/24/19 : 250 lb (113.4 kg)  04/05/19 : 253 lb (114.8 kg)     Last Cholesterol Labs:   Lab Results   Component Value Date    CHOLEST 187 04/08/2019    HDL (N/A, 12/11/2014); patient documented not to have experienced any of the following events (N/A, 12/11/2014); chemotherapy (2014); radiation left (2014); trudi biopsy stereo nodule 1 site right (cpt=19081) (3/2015); lumpectomy left (5/2014); upper gi endoscop Assessment  (Required for AWV/SWV)    Finger Rub-absent bilaterally       Visual Acuity                           General Appearance:  Alert, cooperative, no distress, appears stated age, obese   Head:  Normocephalic, without obvious abnormality, atraumati METABOLIC PANEL (14); Future  -     LIPID PANEL; Future  -     URINALYSIS WITH CULTURE REFLEX; Future    Shortness of breath  -     CT CHEST(CONTRAST ONLY) (CPT=71260);  Future    Obesity, morbid ([address]  -     VITAMIN D, 25-HYDROXY; Future    Decreased heari past six months, have you lost more than 10 pounds without trying?: 2 - No  Has your appetite been poor?: No  How does the patient maintain a good energy level?: (not able to due to SOB)  How would you describe your daily physical activity?: Light  How wou There are no preventive care reminders to display for this patient. Update Health Maintenance if applicable    Chlamydia  Annually if high risk No results found for: CHLAMYDIA No flowsheet data found.     Screening Mammogram      Mammogram Annually to 76, t Date Value   08/08/2014 1.37 (H)     Creatinine (mg/dL)   Date Value   05/20/2019 1.05 (H)    No flowsheet data found. Drug Serum Conc  Annually No results found for: DIGOXIN, DIG, VALP No flowsheet data found.                Template: [facility]IC[facility]

## 2019-10-24 ENCOUNTER — LAB ENCOUNTER (OUTPATIENT)
Dept: LAB | Age: 74
End: 2019-10-24
Attending: FAMILY MEDICINE
Payer: MEDICARE

## 2019-10-24 DIAGNOSIS — E66.01 OBESITY, MORBID (HCC): ICD-10-CM

## 2019-10-24 DIAGNOSIS — E55.9 VITAMIN D DEFICIENCY: ICD-10-CM

## 2019-10-24 DIAGNOSIS — Z85.3 HISTORY OF BREAST CANCER: ICD-10-CM

## 2019-10-24 DIAGNOSIS — I10 ESSENTIAL HYPERTENSION: ICD-10-CM

## 2019-10-24 DIAGNOSIS — D50.8 OTHER IRON DEFICIENCY ANEMIA: ICD-10-CM

## 2019-10-24 DIAGNOSIS — E78.00 HYPERCHOLESTEROLEMIA: ICD-10-CM

## 2019-10-24 PROCEDURE — 83550 IRON BINDING TEST: CPT

## 2019-10-24 PROCEDURE — 82306 VITAMIN D 25 HYDROXY: CPT

## 2019-10-24 PROCEDURE — 83540 ASSAY OF IRON: CPT

## 2019-10-24 PROCEDURE — 82728 ASSAY OF FERRITIN: CPT

## 2019-10-24 PROCEDURE — 80053 COMPREHEN METABOLIC PANEL: CPT

## 2019-10-24 PROCEDURE — 81003 URINALYSIS AUTO W/O SCOPE: CPT

## 2019-10-24 PROCEDURE — 36415 COLL VENOUS BLD VENIPUNCTURE: CPT

## 2019-10-24 PROCEDURE — 85025 COMPLETE CBC W/AUTO DIFF WBC: CPT

## 2019-10-24 PROCEDURE — 80061 LIPID PANEL: CPT

## 2019-10-25 DIAGNOSIS — E55.9 VITAMIN D DEFICIENCY: Primary | ICD-10-CM

## 2019-10-25 RX ORDER — ERGOCALCIFEROL 1.25 MG/1
50000 CAPSULE ORAL WEEKLY
Qty: 12 CAPSULE | Refills: 0 | Status: SHIPPED | OUTPATIENT
Start: 2019-10-25 | End: 2020-07-20 | Stop reason: SDUPTHER

## 2019-11-05 ENCOUNTER — HOSPITAL ENCOUNTER (OUTPATIENT)
Dept: CT IMAGING | Facility: HOSPITAL | Age: 74
Discharge: HOME | End: 2019-11-05
Attending: FAMILY MEDICINE
Payer: MEDICARE

## 2019-11-05 DIAGNOSIS — R06.02 SHORTNESS OF BREATH: ICD-10-CM

## 2019-11-05 PROCEDURE — 71260 CT THORAX DX C+: CPT | Performed by: FAMILY MEDICINE

## 2019-11-06 DIAGNOSIS — E27.8 ADRENAL NODULE (HCC): Primary | ICD-10-CM

## 2019-11-13 ENCOUNTER — HOSPITAL ENCOUNTER (OUTPATIENT)
Dept: MAMMOGRAPHY | Facility: HOSPITAL | Age: 74
Discharge: HOME | End: 2019-11-13
Attending: INTERNAL MEDICINE
Payer: MEDICARE

## 2019-11-13 DIAGNOSIS — Z85.3 HISTORY OF BREAST CANCER: ICD-10-CM

## 2019-11-13 PROCEDURE — 77062 BREAST TOMOSYNTHESIS BI: CPT | Performed by: INTERNAL MEDICINE

## 2019-11-13 PROCEDURE — 77066 DX MAMMO INCL CAD BI: CPT | Performed by: INTERNAL MEDICINE

## 2019-11-13 PROCEDURE — 76642 ULTRASOUND BREAST LIMITED: CPT | Performed by: INTERNAL MEDICINE

## 2019-12-08 DIAGNOSIS — C50.911 MALIGNANT NEOPLASM OF RIGHT BREAST IN FEMALE, ESTROGEN RECEPTOR POSITIVE, UNSPECIFIED SITE OF BREAST: ICD-10-CM

## 2019-12-08 DIAGNOSIS — Z17.0 MALIGNANT NEOPLASM OF RIGHT BREAST IN FEMALE, ESTROGEN RECEPTOR POSITIVE, UNSPECIFIED SITE OF BREAST: ICD-10-CM

## 2019-12-09 RX ORDER — CELECOXIB 200 MG/1
CAPSULE ORAL
Qty: 90 CAPSULE | Refills: 0 | Status: SHIPPED | OUTPATIENT
Start: 2019-12-09 | End: 2020-03-02

## 2020-01-06 ENCOUNTER — APPOINTMENT (OUTPATIENT)
Dept: CARDIOLOGY | Age: 75
End: 2020-01-06

## 2020-01-07 ENCOUNTER — APPOINTMENT (OUTPATIENT)
Dept: LAB | Age: 75
End: 2020-01-07
Attending: FAMILY MEDICINE
Payer: MEDICARE

## 2020-01-08 ENCOUNTER — LAB ENCOUNTER (OUTPATIENT)
Dept: LAB | Age: 75
End: 2020-01-08
Attending: INTERNAL MEDICINE
Payer: MEDICARE

## 2020-01-08 DIAGNOSIS — E78.1 PURE HYPERGLYCERIDEMIA: ICD-10-CM

## 2020-01-08 DIAGNOSIS — Z85.3 HISTORY OF BREAST CANCER: ICD-10-CM

## 2020-01-08 DIAGNOSIS — R06.02 SHORTNESS OF BREATH: Primary | ICD-10-CM

## 2020-01-08 DIAGNOSIS — E78.00 PURE HYPERCHOLESTEROLEMIA: ICD-10-CM

## 2020-01-08 DIAGNOSIS — D50.8 OTHER IRON DEFICIENCY ANEMIA: ICD-10-CM

## 2020-01-08 LAB
ALBUMIN SERPL-MCNC: 3.3 G/DL (ref 3.4–5)
ALBUMIN/GLOB SERPL: 0.8 {RATIO} (ref 1–2)
ALP LIVER SERPL-CCNC: 54 U/L (ref 55–142)
ALT SERPL-CCNC: 23 U/L (ref 13–56)
ANION GAP SERPL CALC-SCNC: 6 MMOL/L (ref 0–18)
AST SERPL-CCNC: 16 U/L (ref 15–37)
BASOPHILS # BLD AUTO: 0.11 X10(3) UL (ref 0–0.2)
BASOPHILS NFR BLD AUTO: 1.6 %
BILIRUB SERPL-MCNC: 0.6 MG/DL (ref 0.1–2)
BUN BLD-MCNC: 21 MG/DL (ref 7–18)
BUN/CREAT SERPL: 20.6 (ref 10–20)
CALCIUM BLD-MCNC: 9 MG/DL (ref 8.5–10.1)
CHLORIDE SERPL-SCNC: 111 MMOL/L (ref 98–112)
CHOLEST SMN-MCNC: 160 MG/DL (ref ?–200)
CO2 SERPL-SCNC: 24 MMOL/L (ref 21–32)
CREAT BLD-MCNC: 1.02 MG/DL (ref 0.55–1.02)
DEPRECATED HBV CORE AB SER IA-ACNC: 56.3 NG/ML (ref 18–340)
DEPRECATED RDW RBC AUTO: 48.9 FL (ref 35.1–46.3)
EOSINOPHIL # BLD AUTO: 0.26 X10(3) UL (ref 0–0.7)
EOSINOPHIL NFR BLD AUTO: 3.7 %
ERYTHROCYTE [DISTWIDTH] IN BLOOD BY AUTOMATED COUNT: 13.9 % (ref 11–15)
GLOBULIN PLAS-MCNC: 3.9 G/DL (ref 2.8–4.4)
GLUCOSE BLD-MCNC: 124 MG/DL (ref 70–99)
HCT VFR BLD AUTO: 41.4 % (ref 35–48)
HDLC SERPL-MCNC: 54 MG/DL (ref 40–59)
HGB BLD-MCNC: 13.2 G/DL (ref 12–16)
IMM GRANULOCYTES # BLD AUTO: 0.01 X10(3) UL (ref 0–1)
IMM GRANULOCYTES NFR BLD: 0.1 %
IRON SATURATION: 16 % (ref 15–50)
IRON SERPL-MCNC: 71 UG/DL (ref 50–170)
LDLC SERPL CALC-MCNC: 69 MG/DL (ref ?–100)
LYMPHOCYTES # BLD AUTO: 2.24 X10(3) UL (ref 1–4)
LYMPHOCYTES NFR BLD AUTO: 32 %
M PROTEIN MFR SERPL ELPH: 7.2 G/DL (ref 6.4–8.2)
MCH RBC QN AUTO: 30.3 PG (ref 26–34)
MCHC RBC AUTO-ENTMCNC: 31.9 G/DL (ref 31–37)
MCV RBC AUTO: 95.2 FL (ref 80–100)
MONOCYTES # BLD AUTO: 0.63 X10(3) UL (ref 0.1–1)
MONOCYTES NFR BLD AUTO: 9 %
NEUTROPHILS # BLD AUTO: 3.75 X10 (3) UL (ref 1.5–7.7)
NEUTROPHILS # BLD AUTO: 3.75 X10(3) UL (ref 1.5–7.7)
NEUTROPHILS NFR BLD AUTO: 53.6 %
NONHDLC SERPL-MCNC: 106 MG/DL (ref ?–130)
NT-PROBNP SERPL-MCNC: 37 PG/ML (ref ?–450)
OSMOLALITY SERPL CALC.SUM OF ELEC: 296 MOSM/KG (ref 275–295)
PLATELET # BLD AUTO: 260 10(3)UL (ref 150–450)
POTASSIUM SERPL-SCNC: 4.1 MMOL/L (ref 3.5–5.1)
RBC # BLD AUTO: 4.35 X10(6)UL (ref 3.8–5.3)
SODIUM SERPL-SCNC: 141 MMOL/L (ref 136–145)
TOTAL IRON BINDING CAPACITY: 456 UG/DL (ref 240–450)
TRANSFERRIN SERPL-MCNC: 306 MG/DL (ref 200–360)
TRIGL SERPL-MCNC: 187 MG/DL (ref 30–149)
VLDLC SERPL CALC-MCNC: 37 MG/DL (ref 0–30)
WBC # BLD AUTO: 7 X10(3) UL (ref 4–11)

## 2020-01-08 PROCEDURE — 83540 ASSAY OF IRON: CPT

## 2020-01-08 PROCEDURE — 83550 IRON BINDING TEST: CPT

## 2020-01-08 PROCEDURE — 80053 COMPREHEN METABOLIC PANEL: CPT

## 2020-01-08 PROCEDURE — 36415 COLL VENOUS BLD VENIPUNCTURE: CPT

## 2020-01-08 PROCEDURE — 85025 COMPLETE CBC W/AUTO DIFF WBC: CPT

## 2020-01-08 PROCEDURE — 82728 ASSAY OF FERRITIN: CPT

## 2020-01-08 PROCEDURE — 80061 LIPID PANEL: CPT

## 2020-01-08 PROCEDURE — 83880 ASSAY OF NATRIURETIC PEPTIDE: CPT

## 2020-01-11 DIAGNOSIS — F32.A DEPRESSION, UNSPECIFIED DEPRESSION TYPE: ICD-10-CM

## 2020-01-11 DIAGNOSIS — I10 ESSENTIAL HYPERTENSION: ICD-10-CM

## 2020-01-13 ENCOUNTER — OFFICE VISIT (OUTPATIENT)
Dept: CARDIOLOGY | Age: 75
End: 2020-01-13

## 2020-01-13 VITALS — DIASTOLIC BLOOD PRESSURE: 76 MMHG | SYSTOLIC BLOOD PRESSURE: 108 MMHG | HEART RATE: 132 BPM

## 2020-01-13 DIAGNOSIS — R06.02 SHORTNESS OF BREATH: ICD-10-CM

## 2020-01-13 DIAGNOSIS — I10 ESSENTIAL HYPERTENSION: ICD-10-CM

## 2020-01-13 DIAGNOSIS — E78.1 HYPERTRIGLYCERIDEMIA: ICD-10-CM

## 2020-01-13 DIAGNOSIS — I25.10 CORONARY ARTERY DISEASE INVOLVING NATIVE CORONARY ARTERY OF NATIVE HEART WITHOUT ANGINA PECTORIS: ICD-10-CM

## 2020-01-13 DIAGNOSIS — R00.0 TACHYCARDIA: Primary | ICD-10-CM

## 2020-01-13 DIAGNOSIS — E66.01 OBESITY, CLASS III, BMI 40-49.9 (MORBID OBESITY) (CMD): ICD-10-CM

## 2020-01-13 PROCEDURE — 93000 ELECTROCARDIOGRAM COMPLETE: CPT | Performed by: NURSE PRACTITIONER

## 2020-01-13 PROCEDURE — 99214 OFFICE O/P EST MOD 30 MIN: CPT | Performed by: NURSE PRACTITIONER

## 2020-01-13 RX ORDER — ROSUVASTATIN CALCIUM 5 MG/1
TABLET, COATED ORAL
Qty: 90 TABLET | Refills: 1 | Status: SHIPPED | OUTPATIENT
Start: 2020-01-13 | End: 2020-07-02

## 2020-01-13 RX ORDER — DULOXETIN HYDROCHLORIDE 60 MG/1
CAPSULE, DELAYED RELEASE ORAL
Qty: 30 CAPSULE | Refills: 0 | Status: SHIPPED | OUTPATIENT
Start: 2020-01-13 | End: 2020-04-10

## 2020-01-13 RX ORDER — METOPROLOL SUCCINATE 25 MG/1
25 TABLET, EXTENDED RELEASE ORAL DAILY
Qty: 30 TABLET | Refills: 11 | Status: SHIPPED | OUTPATIENT
Start: 2020-01-13 | End: 2020-01-14 | Stop reason: SDUPTHER

## 2020-01-13 RX ORDER — BENAZEPRIL HYDROCHLORIDE 20 MG/1
TABLET ORAL
Qty: 90 TABLET | Refills: 1 | Status: SHIPPED | OUTPATIENT
Start: 2020-01-13 | End: 2020-07-02

## 2020-01-13 RX ORDER — AMLODIPINE BESYLATE 5 MG/1
TABLET ORAL
Qty: 90 TABLET | Refills: 1 | Status: SHIPPED | OUTPATIENT
Start: 2020-01-13 | End: 2020-07-02

## 2020-01-13 ASSESSMENT — ENCOUNTER SYMPTOMS
COUGH: 0
ALLERGIC/IMMUNOLOGIC COMMENTS: NO NEW FOOD ALLERGIES
WEIGHT GAIN: 0
BRUISES/BLEEDS EASILY: 0
HEMOPTYSIS: 0
SUSPICIOUS LESIONS: 0
SHORTNESS OF BREATH: 1
FEVER: 0
WEIGHT LOSS: 0
CHILLS: 0
HEMATOCHEZIA: 0

## 2020-01-13 ASSESSMENT — PATIENT HEALTH QUESTIONNAIRE - PHQ9
2. FEELING DOWN, DEPRESSED OR HOPELESS: NOT AT ALL
SUM OF ALL RESPONSES TO PHQ9 QUESTIONS 1 AND 2: 0
1. LITTLE INTEREST OR PLEASURE IN DOING THINGS: NOT AT ALL
SUM OF ALL RESPONSES TO PHQ9 QUESTIONS 1 AND 2: 0

## 2020-01-13 NOTE — TELEPHONE ENCOUNTER
DULOXETINE DR 60MG CAPSULES    Non protocol medication. Please see pended medications. Please sign if appropriate. Thank you      Last OV: 10/21/2019    Last refill: 10/14/2019.

## 2020-01-15 RX ORDER — METOPROLOL SUCCINATE 25 MG/1
25 TABLET, EXTENDED RELEASE ORAL DAILY
Qty: 30 TABLET | Refills: 11 | Status: SHIPPED | OUTPATIENT
Start: 2020-01-15 | End: 2021-01-08

## 2020-03-02 DIAGNOSIS — C50.911 MALIGNANT NEOPLASM OF RIGHT BREAST IN FEMALE, ESTROGEN RECEPTOR POSITIVE, UNSPECIFIED SITE OF BREAST: ICD-10-CM

## 2020-03-02 DIAGNOSIS — Z17.0 MALIGNANT NEOPLASM OF RIGHT BREAST IN FEMALE, ESTROGEN RECEPTOR POSITIVE, UNSPECIFIED SITE OF BREAST: ICD-10-CM

## 2020-03-02 RX ORDER — CELECOXIB 200 MG/1
CAPSULE ORAL
Qty: 90 CAPSULE | Refills: 0 | Status: SHIPPED | OUTPATIENT
Start: 2020-03-02 | End: 2020-06-10

## 2020-04-10 DIAGNOSIS — F32.A DEPRESSION, UNSPECIFIED DEPRESSION TYPE: ICD-10-CM

## 2020-04-10 RX ORDER — DULOXETIN HYDROCHLORIDE 60 MG/1
CAPSULE, DELAYED RELEASE ORAL
Qty: 90 CAPSULE | Refills: 0 | Status: SHIPPED | OUTPATIENT
Start: 2020-04-10 | End: 2020-07-02

## 2020-04-17 ENCOUNTER — TELEPHONE (OUTPATIENT)
Dept: CARDIOLOGY | Age: 75
End: 2020-04-17

## 2020-04-19 ENCOUNTER — PATIENT MESSAGE (OUTPATIENT)
Dept: FAMILY MEDICINE CLINIC | Facility: CLINIC | Age: 75
End: 2020-04-19

## 2020-04-20 NOTE — TELEPHONE ENCOUNTER
Please give patient a call. Please schedule  video visit with me. Unfortunately I cannot make the diagnosis based on the pictures she sent me. The best way is use viola  on the phone.   I hope her daughter could help her to set it up and I can look at the

## 2020-04-20 NOTE — TELEPHONE ENCOUNTER
Discussed w Calderon Robbins PSR-sts she discussed poss video visit w pt who stated she does not have computer or wi-fi.    Scheduled OV for 4/22/2020 200pm  Update to dr Lund Earing as fyi only

## 2020-04-20 NOTE — TELEPHONE ENCOUNTER
From: Gustavo Simpson  To: Julio Pickard MD  Sent: 4/19/2020 8:03 PM CDT  Subject: Other    Hi, can you please give me a call to discuss this rash on my leg? It started very small but every day has gotten bigger and bigger.  It itches and is ha

## 2020-04-21 ENCOUNTER — OFFICE VISIT (OUTPATIENT)
Dept: CARDIOLOGY | Age: 75
End: 2020-04-21

## 2020-04-21 DIAGNOSIS — R06.02 SHORTNESS OF BREATH: Primary | ICD-10-CM

## 2020-04-21 DIAGNOSIS — R00.0 TACHYCARDIA: ICD-10-CM

## 2020-04-21 DIAGNOSIS — E78.00 HYPERCHOLESTEREMIA: ICD-10-CM

## 2020-04-21 DIAGNOSIS — E66.01 OBESITY, CLASS III, BMI 40-49.9 (MORBID OBESITY) (CMD): ICD-10-CM

## 2020-04-21 DIAGNOSIS — K44.9 HIATAL HERNIA: ICD-10-CM

## 2020-04-21 DIAGNOSIS — I10 ESSENTIAL HYPERTENSION: ICD-10-CM

## 2020-04-21 DIAGNOSIS — I25.10 CORONARY ARTERY DISEASE INVOLVING NATIVE CORONARY ARTERY OF NATIVE HEART WITHOUT ANGINA PECTORIS: ICD-10-CM

## 2020-04-21 DIAGNOSIS — R93.1 AGATSTON CORONARY ARTERY CALCIUM SCORE GREATER THAN 400: ICD-10-CM

## 2020-04-21 DIAGNOSIS — E78.1 HYPERTRIGLYCERIDEMIA: ICD-10-CM

## 2020-04-21 PROCEDURE — 99442 TELEPHONE E&M BY PHYSICIAN EST PT NOT ORIG PREV 7 DAYS 11-20 MIN: CPT | Performed by: INTERNAL MEDICINE

## 2020-04-21 SDOH — HEALTH STABILITY: MENTAL HEALTH: HOW OFTEN DO YOU HAVE A DRINK CONTAINING ALCOHOL?: NEVER

## 2020-04-21 ASSESSMENT — ENCOUNTER SYMPTOMS
WEIGHT GAIN: 0
HEMOPTYSIS: 0
ALLERGIC/IMMUNOLOGIC COMMENTS: NO NEW FOOD ALLERGIES
CHILLS: 0
FEVER: 0
BRUISES/BLEEDS EASILY: 0
WEIGHT LOSS: 0
COUGH: 0
HEMATOCHEZIA: 0
SUSPICIOUS LESIONS: 0

## 2020-04-21 ASSESSMENT — PATIENT HEALTH QUESTIONNAIRE - PHQ9
2. FEELING DOWN, DEPRESSED OR HOPELESS: NOT AT ALL
SUM OF ALL RESPONSES TO PHQ9 QUESTIONS 1 AND 2: 0
SUM OF ALL RESPONSES TO PHQ9 QUESTIONS 1 AND 2: 0
1. LITTLE INTEREST OR PLEASURE IN DOING THINGS: NOT AT ALL

## 2020-04-22 ENCOUNTER — OFFICE VISIT (OUTPATIENT)
Dept: FAMILY MEDICINE CLINIC | Facility: CLINIC | Age: 75
End: 2020-04-22
Payer: MEDICARE

## 2020-04-22 VITALS
TEMPERATURE: 98 F | HEART RATE: 104 BPM | RESPIRATION RATE: 18 BRPM | HEIGHT: 66 IN | SYSTOLIC BLOOD PRESSURE: 102 MMHG | DIASTOLIC BLOOD PRESSURE: 68 MMHG | BODY MASS INDEX: 40 KG/M2

## 2020-04-22 DIAGNOSIS — E78.2 HYPERLIPIDEMIA, MIXED: ICD-10-CM

## 2020-04-22 DIAGNOSIS — L98.9 SKIN LESION OF RIGHT LEG: Primary | ICD-10-CM

## 2020-04-22 DIAGNOSIS — C50.912 BREAST CARCINOMA, FEMALE, LEFT (HCC): ICD-10-CM

## 2020-04-22 DIAGNOSIS — I10 ESSENTIAL HYPERTENSION: ICD-10-CM

## 2020-04-22 DIAGNOSIS — E66.01 OBESITY, MORBID (HCC): ICD-10-CM

## 2020-04-22 DIAGNOSIS — E27.8 ADRENAL NODULE (HCC): ICD-10-CM

## 2020-04-22 DIAGNOSIS — F41.9 ANXIETY: ICD-10-CM

## 2020-04-22 PROBLEM — E27.9 ADRENAL NODULE (HCC): Status: ACTIVE | Noted: 2020-04-22

## 2020-04-22 PROCEDURE — 99214 OFFICE O/P EST MOD 30 MIN: CPT | Performed by: FAMILY MEDICINE

## 2020-04-22 RX ORDER — SULFAMETHOXAZOLE AND TRIMETHOPRIM 800; 160 MG/1; MG/1
1 TABLET ORAL 2 TIMES DAILY
Qty: 14 TABLET | Refills: 0 | Status: SHIPPED | OUTPATIENT
Start: 2020-04-22 | End: 2020-08-26

## 2020-04-22 NOTE — PATIENT INSTRUCTIONS
Start Bactrim DS 1 tablet twice a day. Take probiotic daily. Mupirocin applied 3 times per day for 10 to 14 days. Follow-up in 2 weeks with phone visit to update on the rash. Continue other medications.   Call office with your atenolol dose so we can up

## 2020-04-22 NOTE — PROGRESS NOTES
Heidi Ferreira is a 66year old female. cc right leg skin lesions, hypertension hyperlipidemia anxiety, breast cancer obesity,   HPI:   Patient is coming to the office complaining of having to lesions on the right leg.   The lesion on the distal par daily. 22 g 1   • DULoxetine HCl 60 MG Oral Cap DR Particles TAKE ONE CAPSULE BY MOUTH EVERY DAY 90 capsule 0   • CELECOXIB 200 MG Oral Cap TAKE 1 CAPSULE BY MOUTH EVERY DAY 90 capsule 0   • BENAZEPRIL HCL 20 MG Oral Tab TAKE 1 TABLET(20 MG) BY MOUTH DAILY Location: Right arm, Patient Position: Sitting, Cuff Size: large)   Pulse 104   Temp 98.4 °F (36.9 °C) (Oral)   Resp 18   Ht 66\"   BMI 40.35 kg/m²   GENERAL: well developed, well nourished,in no apparent distress  SKIN: no rashes,no suspicious lesions  HE MEDIASTINUM:  No mass or adenopathy. CARDIAC:  Coronary artery calcifications. PLEURA:  No mass or effusion. THORACIC AORTA:  No aneurysm. Atherosclerosis. CHEST WALL:  No mass or axillary adenopathy.     LIMITED ABDOMEN:  Limited images of the u Hantel on tamoxifen doing well    Anxiety on duloxetine doing well. No orders of the defined types were placed in this encounter.       Meds & Refills for this Visit:  Requested Prescriptions     Signed Prescriptions Disp Refills   • Sulfamethoxazole-TMP

## 2020-04-27 ENCOUNTER — APPOINTMENT (OUTPATIENT)
Dept: CARDIOLOGY | Age: 75
End: 2020-04-27

## 2020-05-04 ENCOUNTER — MED REC SCAN ONLY (OUTPATIENT)
Dept: FAMILY MEDICINE CLINIC | Facility: CLINIC | Age: 75
End: 2020-05-04

## 2020-06-09 DIAGNOSIS — C50.911 MALIGNANT NEOPLASM OF RIGHT BREAST IN FEMALE, ESTROGEN RECEPTOR POSITIVE, UNSPECIFIED SITE OF BREAST: ICD-10-CM

## 2020-06-09 DIAGNOSIS — Z17.0 MALIGNANT NEOPLASM OF RIGHT BREAST IN FEMALE, ESTROGEN RECEPTOR POSITIVE, UNSPECIFIED SITE OF BREAST: ICD-10-CM

## 2020-06-10 RX ORDER — CELECOXIB 200 MG/1
CAPSULE ORAL
Qty: 90 CAPSULE | Refills: 0 | Status: SHIPPED | OUTPATIENT
Start: 2020-06-10 | End: 2020-10-02

## 2020-07-02 DIAGNOSIS — I10 ESSENTIAL HYPERTENSION: ICD-10-CM

## 2020-07-02 DIAGNOSIS — F32.A DEPRESSION, UNSPECIFIED DEPRESSION TYPE: ICD-10-CM

## 2020-07-02 RX ORDER — AMLODIPINE BESYLATE 5 MG/1
TABLET ORAL
Qty: 90 TABLET | Refills: 0 | Status: SHIPPED | OUTPATIENT
Start: 2020-07-02 | End: 2020-09-30

## 2020-07-02 RX ORDER — ROSUVASTATIN CALCIUM 5 MG/1
TABLET, COATED ORAL
Qty: 90 TABLET | Refills: 0 | Status: SHIPPED | OUTPATIENT
Start: 2020-07-02 | End: 2020-09-30

## 2020-07-02 RX ORDER — BENAZEPRIL HYDROCHLORIDE 20 MG/1
TABLET ORAL
Qty: 90 TABLET | Refills: 0 | Status: SHIPPED | OUTPATIENT
Start: 2020-07-02 | End: 2020-09-30

## 2020-07-02 RX ORDER — TAMOXIFEN CITRATE 20 MG/1
TABLET ORAL
Qty: 90 TABLET | Refills: 3 | Status: SHIPPED | OUTPATIENT
Start: 2020-07-02 | End: 2021-06-28

## 2020-07-02 RX ORDER — DULOXETIN HYDROCHLORIDE 60 MG/1
CAPSULE, DELAYED RELEASE ORAL
Qty: 90 CAPSULE | Refills: 0 | Status: SHIPPED | OUTPATIENT
Start: 2020-07-02 | End: 2020-10-01

## 2020-07-02 NOTE — TELEPHONE ENCOUNTER
DULOXETINE DR 60MG CAPSULES    Non protocol medication. Please see pended medications. Please sign if appropriate.       Thank you    Last OV: 04/22/2020

## 2020-07-14 ENCOUNTER — APPOINTMENT (OUTPATIENT)
Dept: HEMATOLOGY/ONCOLOGY | Facility: HOSPITAL | Age: 75
End: 2020-07-14
Attending: INTERNAL MEDICINE
Payer: MEDICARE

## 2020-07-16 ENCOUNTER — LAB ENCOUNTER (OUTPATIENT)
Dept: LAB | Age: 75
End: 2020-07-16
Attending: INTERNAL MEDICINE
Payer: MEDICARE

## 2020-07-16 DIAGNOSIS — D50.0 IRON DEFICIENCY ANEMIA DUE TO CHRONIC BLOOD LOSS: ICD-10-CM

## 2020-07-16 DIAGNOSIS — E55.9 VITAMIN D DEFICIENCY: ICD-10-CM

## 2020-07-16 DIAGNOSIS — D50.8 OTHER IRON DEFICIENCY ANEMIA: Primary | ICD-10-CM

## 2020-07-16 LAB
BASOPHILS # BLD AUTO: 0.09 X10(3) UL (ref 0–0.2)
BASOPHILS NFR BLD AUTO: 0.9 %
DEPRECATED HBV CORE AB SER IA-ACNC: 19.1 NG/ML (ref 18–340)
DEPRECATED RDW RBC AUTO: 51.8 FL (ref 35.1–46.3)
EOSINOPHIL # BLD AUTO: 0.28 X10(3) UL (ref 0–0.7)
EOSINOPHIL NFR BLD AUTO: 2.9 %
ERYTHROCYTE [DISTWIDTH] IN BLOOD BY AUTOMATED COUNT: 14.3 % (ref 11–15)
HCT VFR BLD AUTO: 41.5 % (ref 35–48)
HGB BLD-MCNC: 12.8 G/DL (ref 12–16)
IMM GRANULOCYTES # BLD AUTO: 0.03 X10(3) UL (ref 0–1)
IMM GRANULOCYTES NFR BLD: 0.3 %
IRON SATURATION: 10 % (ref 15–50)
IRON SERPL-MCNC: 49 UG/DL (ref 50–170)
LYMPHOCYTES # BLD AUTO: 2.51 X10(3) UL (ref 1–4)
LYMPHOCYTES NFR BLD AUTO: 26 %
MCH RBC QN AUTO: 30.3 PG (ref 26–34)
MCHC RBC AUTO-ENTMCNC: 30.8 G/DL (ref 31–37)
MCV RBC AUTO: 98.1 FL (ref 80–100)
MONOCYTES # BLD AUTO: 0.81 X10(3) UL (ref 0.1–1)
MONOCYTES NFR BLD AUTO: 8.4 %
NEUTROPHILS # BLD AUTO: 5.94 X10 (3) UL (ref 1.5–7.7)
NEUTROPHILS # BLD AUTO: 5.94 X10(3) UL (ref 1.5–7.7)
NEUTROPHILS NFR BLD AUTO: 61.5 %
PLATELET # BLD AUTO: 257 10(3)UL (ref 150–450)
RBC # BLD AUTO: 4.23 X10(6)UL (ref 3.8–5.3)
TOTAL IRON BINDING CAPACITY: 475 UG/DL (ref 240–450)
TRANSFERRIN SERPL-MCNC: 319 MG/DL (ref 200–360)
VIT D+METAB SERPL-MCNC: 15 NG/ML (ref 30–100)
WBC # BLD AUTO: 9.7 X10(3) UL (ref 4–11)

## 2020-07-16 PROCEDURE — 85025 COMPLETE CBC W/AUTO DIFF WBC: CPT

## 2020-07-16 PROCEDURE — 82728 ASSAY OF FERRITIN: CPT

## 2020-07-16 PROCEDURE — 83540 ASSAY OF IRON: CPT

## 2020-07-16 PROCEDURE — 36415 COLL VENOUS BLD VENIPUNCTURE: CPT

## 2020-07-16 PROCEDURE — 83550 IRON BINDING TEST: CPT

## 2020-07-16 PROCEDURE — 82306 VITAMIN D 25 HYDROXY: CPT

## 2020-07-20 DIAGNOSIS — E55.9 VITAMIN D DEFICIENCY: Primary | ICD-10-CM

## 2020-07-20 RX ORDER — ERGOCALCIFEROL 1.25 MG/1
50000 CAPSULE ORAL WEEKLY
Qty: 12 CAPSULE | Refills: 0 | Status: SHIPPED | OUTPATIENT
Start: 2020-07-20 | End: 2020-10-02 | Stop reason: ALTCHOICE

## 2020-07-21 ENCOUNTER — OFFICE VISIT (OUTPATIENT)
Dept: HEMATOLOGY/ONCOLOGY | Facility: HOSPITAL | Age: 75
End: 2020-07-21
Attending: INTERNAL MEDICINE
Payer: MEDICARE

## 2020-07-21 VITALS
DIASTOLIC BLOOD PRESSURE: 95 MMHG | HEART RATE: 85 BPM | TEMPERATURE: 97 F | RESPIRATION RATE: 18 BRPM | SYSTOLIC BLOOD PRESSURE: 151 MMHG | OXYGEN SATURATION: 97 %

## 2020-07-21 DIAGNOSIS — D50.0 IRON DEFICIENCY ANEMIA DUE TO CHRONIC BLOOD LOSS: Primary | ICD-10-CM

## 2020-07-21 PROCEDURE — 96376 TX/PRO/DX INJ SAME DRUG ADON: CPT

## 2020-07-21 PROCEDURE — 96365 THER/PROPH/DIAG IV INF INIT: CPT

## 2020-07-21 NOTE — PROGRESS NOTES
Education Record    Learner:  Patient    Disease / Diagnosis: infed infusion    Barriers / Limitations:  None   Comments:    Method:  Brief focused and Discussion   Comments:    General Topics:  Side effects and symptom management and Plan of care reviewed

## 2020-08-26 ENCOUNTER — APPOINTMENT (OUTPATIENT)
Dept: GENERAL RADIOLOGY | Facility: HOSPITAL | Age: 75
End: 2020-08-26
Attending: EMERGENCY MEDICINE
Payer: MEDICARE

## 2020-08-26 ENCOUNTER — HOSPITAL ENCOUNTER (EMERGENCY)
Facility: HOSPITAL | Age: 75
Discharge: HOME | End: 2020-08-26
Attending: EMERGENCY MEDICINE
Payer: MEDICARE

## 2020-08-26 VITALS
WEIGHT: 250 LBS | HEIGHT: 67 IN | HEART RATE: 96 BPM | TEMPERATURE: 97 F | BODY MASS INDEX: 39.24 KG/M2 | RESPIRATION RATE: 18 BRPM | SYSTOLIC BLOOD PRESSURE: 149 MMHG | OXYGEN SATURATION: 96 % | DIASTOLIC BLOOD PRESSURE: 96 MMHG

## 2020-08-26 DIAGNOSIS — S62.101A CLOSED FRACTURE OF RIGHT WRIST, INITIAL ENCOUNTER: Primary | ICD-10-CM

## 2020-08-26 DIAGNOSIS — S92.414A CLOSED NONDISPLACED FRACTURE OF PROXIMAL PHALANX OF RIGHT GREAT TOE, INITIAL ENCOUNTER: ICD-10-CM

## 2020-08-26 PROCEDURE — 29125 APPL SHORT ARM SPLINT STATIC: CPT

## 2020-08-26 PROCEDURE — 99284 EMERGENCY DEPT VISIT MOD MDM: CPT

## 2020-08-26 PROCEDURE — 73110 X-RAY EXAM OF WRIST: CPT | Performed by: EMERGENCY MEDICINE

## 2020-08-26 PROCEDURE — 73660 X-RAY EXAM OF TOE(S): CPT | Performed by: EMERGENCY MEDICINE

## 2020-08-26 RX ORDER — HYDROCODONE BITARTRATE AND ACETAMINOPHEN 5; 325 MG/1; MG/1
1-2 TABLET ORAL EVERY 6 HOURS PRN
Qty: 10 TABLET | Refills: 0 | Status: SHIPPED | OUTPATIENT
Start: 2020-08-26 | End: 2020-09-02

## 2020-08-26 RX ORDER — IBUPROFEN 800 MG/1
800 TABLET ORAL ONCE
Status: DISCONTINUED | OUTPATIENT
Start: 2020-08-26 | End: 2020-08-26

## 2020-08-26 RX ORDER — HYDROCODONE BITARTRATE AND ACETAMINOPHEN 5; 325 MG/1; MG/1
1 TABLET ORAL ONCE
Status: COMPLETED | OUTPATIENT
Start: 2020-08-26 | End: 2020-08-26

## 2020-08-27 NOTE — ED PROVIDER NOTES
Patient Seen in: [facility] Emergency Department      History   Patient presents with:  Upper Extremity Injury    Stated Complaint: upper ext injury     HPI    75-year-old female presents emergency department who accidentally tripped over a power co above.    Physical Exam     ED Triage Vitals [08/26/20 2023]   BP    Pulse 109   Resp 18   Temp 97 °F (36.1 °C)   Temp src Temporal   SpO2 93 %   O2 Device None (Room air)       Current:Pulse 109   Temp 97 °F (36.1 °C) (Temporal)   Resp 18   Ht 170.2 cm (5 were obtained. COMPARISON:  None. INDICATIONS:  Status post injury, pain to the right great toe. PATIENT STATED HISTORY: (As transcribed by Technologist)  Patient tripped over a cord and fell, she has right wrist and right great toe pain.     FINDINGS: personally myself. Agree with radiology assessment. Went over films with patient. Patient unfortunately has a wrist fracture in a toe fracture. She was put in a short arm splint for her wrist and instructed to ice and elevate.   She will need to follo

## 2020-08-27 NOTE — ED INITIAL ASSESSMENT (HPI)
Pt tripped over cord, fell on right arm, injuring right wrist within 1 hr pta. Pt able to wiggle all fingers, good radial pulse.  Also c/o right great toe pain

## 2020-09-06 DIAGNOSIS — C50.911 MALIGNANT NEOPLASM OF RIGHT BREAST IN FEMALE, ESTROGEN RECEPTOR POSITIVE, UNSPECIFIED SITE OF BREAST: ICD-10-CM

## 2020-09-06 DIAGNOSIS — Z17.0 MALIGNANT NEOPLASM OF RIGHT BREAST IN FEMALE, ESTROGEN RECEPTOR POSITIVE, UNSPECIFIED SITE OF BREAST: ICD-10-CM

## 2020-09-08 RX ORDER — CELECOXIB 200 MG/1
CAPSULE ORAL
Qty: 90 CAPSULE | Refills: 0 | OUTPATIENT
Start: 2020-09-08

## 2020-09-10 ENCOUNTER — TELEPHONE (OUTPATIENT)
Dept: FAMILY MEDICINE CLINIC | Facility: CLINIC | Age: 75
End: 2020-09-10

## 2020-09-10 NOTE — TELEPHONE ENCOUNTER
Patient was previously getting a script for CELECOXIB 200 MG from Dr. Yanni Bond. She is no longer seeing him regularly (other than annual check up) and he will no longer fill this for her. She wants to know if Dr. Flavia Boucher will take over managing this med?

## 2020-09-10 NOTE — TELEPHONE ENCOUNTER
She could try some topical Voltaren gel  over-the-counter as needed. Still use Tylenol. She can schedule appointment office whenever there is feasible for her and should be at least 2 weeks after she was tested for COVID-19.   Thank you The patient is a 34y Female complaining of chest pain.

## 2020-09-10 NOTE — TELEPHONE ENCOUNTER
See note below, pt last office visit 4/22/20, pt needs office visit for this? If so what kind? Telephone or in-person?

## 2020-09-10 NOTE — TELEPHONE ENCOUNTER
If the patient would like to continue this medication will need office visit to discuss its use side effects etc.  She can intermittently try Tylenol arthritis over-the-counter to 1 tablet twice a day.

## 2020-09-10 NOTE — TELEPHONE ENCOUNTER
Pt offered SDA appt next week and declined, as pt has \"broken wrist\", states she has been exposed to a Covid + person, had COVID test done yesterday and waiting for results. Pt states she has tried Tylenol arthritis before and \"it does not work\".

## 2020-09-10 NOTE — TELEPHONE ENCOUNTER
She would have to get the video visit because of being Medicare. We may use SDA next week and see her in the office.   Thank you

## 2020-09-30 DIAGNOSIS — I10 ESSENTIAL HYPERTENSION: ICD-10-CM

## 2020-09-30 DIAGNOSIS — F32.A DEPRESSION, UNSPECIFIED DEPRESSION TYPE: ICD-10-CM

## 2020-09-30 RX ORDER — ROSUVASTATIN CALCIUM 5 MG/1
TABLET, COATED ORAL
Qty: 90 TABLET | Refills: 0 | Status: SHIPPED | OUTPATIENT
Start: 2020-09-30 | End: 2020-12-29

## 2020-09-30 RX ORDER — BENAZEPRIL HYDROCHLORIDE 20 MG/1
TABLET ORAL
Qty: 90 TABLET | Refills: 0 | Status: SHIPPED | OUTPATIENT
Start: 2020-09-30 | End: 2020-12-29

## 2020-09-30 RX ORDER — AMLODIPINE BESYLATE 5 MG/1
5 TABLET ORAL DAILY
Qty: 90 TABLET | Refills: 0 | Status: SHIPPED | OUTPATIENT
Start: 2020-09-30 | End: 2020-12-29

## 2020-09-30 NOTE — TELEPHONE ENCOUNTER
DULOXETINE DR 60MG CAPSULES    Non protocol medication. Please see pended medications. Please sign if appropriate. Thank you      She has a upcoming appt scheduled for 10/02/2020.

## 2020-10-01 RX ORDER — DULOXETIN HYDROCHLORIDE 60 MG/1
CAPSULE, DELAYED RELEASE ORAL
Qty: 90 CAPSULE | Refills: 0 | Status: SHIPPED | OUTPATIENT
Start: 2020-10-01 | End: 2020-12-29

## 2020-10-02 ENCOUNTER — OFFICE VISIT (OUTPATIENT)
Dept: FAMILY MEDICINE CLINIC | Facility: CLINIC | Age: 75
End: 2020-10-02
Payer: MEDICARE

## 2020-10-02 VITALS
HEIGHT: 67 IN | BODY MASS INDEX: 39.24 KG/M2 | TEMPERATURE: 97 F | OXYGEN SATURATION: 95 % | DIASTOLIC BLOOD PRESSURE: 86 MMHG | HEART RATE: 101 BPM | RESPIRATION RATE: 18 BRPM | WEIGHT: 250 LBS | SYSTOLIC BLOOD PRESSURE: 126 MMHG

## 2020-10-02 DIAGNOSIS — E66.01 OBESITY, MORBID (HCC): ICD-10-CM

## 2020-10-02 DIAGNOSIS — M25.561 CHRONIC PAIN OF RIGHT KNEE: Primary | ICD-10-CM

## 2020-10-02 DIAGNOSIS — R10.9 ABDOMINAL CRAMPS: ICD-10-CM

## 2020-10-02 DIAGNOSIS — G89.29 CHRONIC PAIN OF LEFT KNEE: ICD-10-CM

## 2020-10-02 DIAGNOSIS — G89.29 CHRONIC PAIN OF RIGHT KNEE: Primary | ICD-10-CM

## 2020-10-02 DIAGNOSIS — M25.562 CHRONIC PAIN OF LEFT KNEE: ICD-10-CM

## 2020-10-02 DIAGNOSIS — E55.9 VITAMIN D DEFICIENCY: ICD-10-CM

## 2020-10-02 PROCEDURE — 99214 OFFICE O/P EST MOD 30 MIN: CPT | Performed by: FAMILY MEDICINE

## 2020-10-02 RX ORDER — METOPROLOL SUCCINATE 25 MG/1
25 TABLET, EXTENDED RELEASE ORAL DAILY
COMMUNITY

## 2020-10-02 RX ORDER — DICYCLOMINE HCL 20 MG
20 TABLET ORAL
Qty: 60 TABLET | Refills: 1 | Status: SHIPPED | OUTPATIENT
Start: 2020-10-02 | End: 2021-11-08

## 2020-10-02 RX ORDER — CELECOXIB 200 MG/1
200 CAPSULE ORAL DAILY
Qty: 90 CAPSULE | Refills: 0 | Status: SHIPPED | OUTPATIENT
Start: 2020-10-02 | End: 2020-12-29

## 2020-10-02 NOTE — PATIENT INSTRUCTIONS
You can use Celebrex 1 tablet daily as needed for your knee pain. Call [phone] to schedule x-ray of your knees. Schedule an appointment with Dr. Ryley Oneill orthopedic specialist for evaluation of the knee pain.   Continue vitamin D3 1000 units daily over

## 2020-10-03 NOTE — PROGRESS NOTES
Dagoberto Duran is a 66year old female. cc bilateral knee pain, vitamin D deficiency, abdominal cramps/bloating, obesity  HPI:   Patient is come to the office for evaluation of the bilateral knee pain. Both of the knees are hurting her.   The pain is MOUTH DAILY 90 tablet 0   • amLODIPine Besylate 5 MG Oral Tab Take 1 tablet (5 mg total) by mouth daily.  90 tablet 0   • ROSUVASTATIN CALCIUM 5 MG Oral Tab TAKE 1 TABLET(5 MG) BY MOUTH EVERY NIGHT 90 tablet 0   • TAMOXIFEN CITRATE 20 MG Oral Tab TAKE 1 TAB BS's,no masses, HSM or tenderness  EXTREMITIES: no edema  Musculoskeletal no tenderness over the bilateral knees no swelling  Psychiatric - alert  and oriented x3, normal mood     Results for orders placed or performed in visit on 07/16/20   VITAMIN D, 25- Sig: Take 1 tablet (20 mg total) by mouth 4 (four) times daily before meals and nightly. You can use Celebrex 1 tablet daily as needed for your knee pain. I have  discussed with patient use, expected action and side effects of medication.  Questions an

## 2020-10-05 ENCOUNTER — APPOINTMENT (OUTPATIENT)
Dept: CARDIOLOGY | Age: 75
End: 2020-10-05

## 2020-10-27 ENCOUNTER — OFFICE VISIT (OUTPATIENT)
Dept: HEMATOLOGY/ONCOLOGY | Facility: HOSPITAL | Age: 75
End: 2020-10-27
Attending: INTERNAL MEDICINE
Payer: MEDICARE

## 2020-10-27 VITALS
SYSTOLIC BLOOD PRESSURE: 126 MMHG | OXYGEN SATURATION: 94 % | RESPIRATION RATE: 16 BRPM | DIASTOLIC BLOOD PRESSURE: 85 MMHG | HEART RATE: 113 BPM | TEMPERATURE: 97 F

## 2020-10-27 DIAGNOSIS — Z78.0 ASYMPTOMATIC MENOPAUSAL STATE: Primary | ICD-10-CM

## 2020-10-27 DIAGNOSIS — Z86.2 HISTORY OF IRON DEFICIENCY ANEMIA: ICD-10-CM

## 2020-10-27 DIAGNOSIS — Z85.3 HISTORY OF BREAST CANCER: ICD-10-CM

## 2020-10-27 PROCEDURE — 99213 OFFICE O/P EST LOW 20 MIN: CPT | Performed by: INTERNAL MEDICINE

## 2020-10-27 NOTE — PROGRESS NOTES
Patient is here for MD f/u for breast cancer and anemia. Patient is on Tamoxifen daily. Tolerating well. Last Infed infusion was in July. She is feeling well. Denies any complaints at present time. Patient is due for a DEXA scan and Mammogram in November.

## 2020-10-29 NOTE — PROGRESS NOTES
[facility]    PATIENT'S NAME: Adia Merino   ATTENDING PHYSICIAN: Sandra Woody M.D.    PATIENT ACCOUNT #: [de-identified] LOCATION: [address] [place] [address] RECORD #: [id number] YOB: 1945   DATE OF SERVICE: 10/27/2020       CANCER tamoxifen 20 mg daily. PHYSICAL EXAMINATION:    GENERAL:  She is an obese female in no acute distress. VITAL SIGNS:  Her performance status is 0 to 1. Her weight is 250 pounds.   Blood pressure is 126/85, pulse 111, respiratory rate is 20, temperature

## 2020-10-30 ENCOUNTER — LAB ENCOUNTER (OUTPATIENT)
Dept: LAB | Age: 75
End: 2020-10-30
Attending: INTERNAL MEDICINE
Payer: MEDICARE

## 2020-10-30 DIAGNOSIS — E55.9 VITAMIN D DEFICIENCY: Primary | ICD-10-CM

## 2020-10-30 DIAGNOSIS — E55.9 VITAMIN D DEFICIENCY: ICD-10-CM

## 2020-10-30 PROCEDURE — 36415 COLL VENOUS BLD VENIPUNCTURE: CPT

## 2020-10-30 PROCEDURE — 82306 VITAMIN D 25 HYDROXY: CPT

## 2020-10-30 RX ORDER — ERGOCALCIFEROL 1.25 MG/1
50000 CAPSULE ORAL WEEKLY
Qty: 12 CAPSULE | Refills: 0 | Status: SHIPPED | OUTPATIENT
Start: 2020-10-30 | End: 2020-11-29

## 2020-11-09 ENCOUNTER — HOSPITAL ENCOUNTER (OUTPATIENT)
Dept: BONE DENSITY | Age: 75
Discharge: HOME | End: 2020-11-09
Attending: INTERNAL MEDICINE
Payer: MEDICARE

## 2020-11-09 DIAGNOSIS — Z78.0 ASYMPTOMATIC MENOPAUSAL STATE: ICD-10-CM

## 2020-11-09 PROCEDURE — 77080 DXA BONE DENSITY AXIAL: CPT | Performed by: INTERNAL MEDICINE

## 2020-12-21 ENCOUNTER — APPOINTMENT (OUTPATIENT)
Dept: CARDIOLOGY | Age: 75
End: 2020-12-21

## 2020-12-29 DIAGNOSIS — I10 ESSENTIAL HYPERTENSION: ICD-10-CM

## 2020-12-29 DIAGNOSIS — F32.A DEPRESSION, UNSPECIFIED DEPRESSION TYPE: ICD-10-CM

## 2020-12-29 RX ORDER — AMLODIPINE BESYLATE 5 MG/1
TABLET ORAL
Qty: 90 TABLET | Refills: 0 | Status: SHIPPED | OUTPATIENT
Start: 2020-12-29 | End: 2021-03-29

## 2020-12-29 RX ORDER — CELECOXIB 200 MG/1
CAPSULE ORAL
Qty: 90 CAPSULE | Refills: 0 | Status: SHIPPED | OUTPATIENT
Start: 2020-12-29 | End: 2021-03-31

## 2020-12-29 RX ORDER — BENAZEPRIL HYDROCHLORIDE 20 MG/1
TABLET ORAL
Qty: 90 TABLET | Refills: 0 | Status: SHIPPED | OUTPATIENT
Start: 2020-12-29 | End: 2021-03-29

## 2020-12-29 RX ORDER — ROSUVASTATIN CALCIUM 5 MG/1
TABLET, COATED ORAL
Qty: 90 TABLET | Refills: 0 | Status: SHIPPED | OUTPATIENT
Start: 2020-12-29 | End: 2021-03-29

## 2020-12-29 RX ORDER — DULOXETIN HYDROCHLORIDE 60 MG/1
CAPSULE, DELAYED RELEASE ORAL
Qty: 90 CAPSULE | Refills: 0 | Status: SHIPPED | OUTPATIENT
Start: 2020-12-29 | End: 2021-03-29

## 2021-01-07 ENCOUNTER — OFFICE VISIT (OUTPATIENT)
Dept: FAMILY MEDICINE CLINIC | Facility: CLINIC | Age: 76
End: 2021-01-07
Payer: MEDICARE

## 2021-01-07 VITALS
OXYGEN SATURATION: 98 % | BODY MASS INDEX: 39.24 KG/M2 | RESPIRATION RATE: 18 BRPM | WEIGHT: 250 LBS | HEIGHT: 67 IN | TEMPERATURE: 99 F | HEART RATE: 85 BPM | SYSTOLIC BLOOD PRESSURE: 132 MMHG | DIASTOLIC BLOOD PRESSURE: 76 MMHG

## 2021-01-07 DIAGNOSIS — Z85.3 HISTORY OF LEFT BREAST CANCER: ICD-10-CM

## 2021-01-07 DIAGNOSIS — E66.01 OBESITY, MORBID (HCC): ICD-10-CM

## 2021-01-07 DIAGNOSIS — E55.9 VITAMIN D DEFICIENCY: ICD-10-CM

## 2021-01-07 DIAGNOSIS — E27.8 ADRENAL NODULE (HCC): ICD-10-CM

## 2021-01-07 DIAGNOSIS — F32.A DEPRESSION, UNSPECIFIED DEPRESSION TYPE: ICD-10-CM

## 2021-01-07 DIAGNOSIS — I10 ESSENTIAL HYPERTENSION: ICD-10-CM

## 2021-01-07 DIAGNOSIS — E78.2 HYPERLIPIDEMIA, MIXED: ICD-10-CM

## 2021-01-07 DIAGNOSIS — K44.9 HIATAL HERNIA: ICD-10-CM

## 2021-01-07 DIAGNOSIS — Z00.00 MEDICARE ANNUAL WELLNESS VISIT, SUBSEQUENT: Primary | ICD-10-CM

## 2021-01-07 PROCEDURE — G0439 PPPS, SUBSEQ VISIT: HCPCS | Performed by: FAMILY MEDICINE

## 2021-01-07 PROCEDURE — 99214 OFFICE O/P EST MOD 30 MIN: CPT | Performed by: FAMILY MEDICINE

## 2021-01-07 NOTE — PATIENT INSTRUCTIONS
Call [phone] to schedule Blood tests. Continue current meds. Watch diet for fats and carbs. Stay active.      [address] [facility] SCHEDULE   Tests on this list are recommended by your physician but may not be covered, or covered at t visit.  Limited to patients who meet one of the following criteria:   • Men who are 73-68 years old and have smoked more than 100 cigarettes in their lifetime   • Anyone with a family history    Colorectal Cancer Screening  Covered up to Age 76     Colonosc reminders to display for this patient.  Please get this Mammogram regularly   Immunizations      Influenza  Covered Annually Orders placed or performed in visit on 10/21/19   • FLU VACC HIGH DOSE PRSV FREE    Please get every year    Pneumococcal 13 (Misael Milling Emir[place])  [email or web address]  This link also has information from the [address] [place] [address] regarding Advance Directives.

## 2021-01-08 ENCOUNTER — TELEPHONE (OUTPATIENT)
Dept: CARDIOLOGY | Age: 76
End: 2021-01-08

## 2021-01-08 RX ORDER — METOPROLOL SUCCINATE 25 MG/1
25 TABLET, EXTENDED RELEASE ORAL DAILY
Qty: 90 TABLET | Refills: 0 | Status: SHIPPED | OUTPATIENT
Start: 2021-01-08 | End: 2021-04-13

## 2021-01-08 NOTE — PROGRESS NOTES
HPI:   Jovani Glasgow is a 66year old female who presents for a Medicare Subsequent Annual Wellness visit (Pt already had Initial Annual Wellness) also follow-up on hypertension hyperlipidemia anxiety/depression anemia obesity, h/o breast ca. as listed below:  She has difficulties Affording Meds based on screening of functional status. Are you able to afford your medications?: No   She has problems with Daily Activities based on screening of functional status.    Problems with daily activities Component Value Date    CHOLEST 160 01/08/2020    HDL 54 01/08/2020    LDL 69 01/08/2020    TRIG 187 (H) 01/08/2020          Last Chemistry Labs:   Lab Results   Component Value Date    AST 16 01/08/2020    ALT 23 01/08/2020    CA 9.0 01/08/2020    ALB 3 12/11/2014); chemotherapy (2014); radiation left (2014); trudi biopsy stereo nodule 1 site right (cpt=19081) (3/2015); lumpectomy left (5/2014); upper gi endoscopy,biopsy (1/8/18= Hiatal hernia.  SB Bx normal); colonoscopy (1/9/18= Hemorrhoids); colonoscopy ( Assessment  (Required for AWV/SWV)    Finger Rub-absent bilaterally       Visual Acuity                           General Appearance:  Alert, cooperative, no distress, appears stated age, obese   Head:  Normocephalic, without obvious abnormality, atraumati REFLEX; Future    Hyperlipidemia, mixed  -     LIPID PANEL; Future  -     COMP METABOLIC PANEL (14);  Future  -     TSH W REFLEX TO FREE T4; Future    Vitamin D deficiency  -     VITAMIN D, 25-HYDROXY; Future    Depression, unspecified depression type    Hi to patient  PREVENTATIVE SERVICES  INDICATIONS AND SCHEDULE Internal Lab or Procedure External Lab or Procedure   Diabetes Screening      HbgA1C   Annually Lab Results   Component Value Date    A1C 5.3 09/09/2016    No flowsheet data found.     Fasting Bloo get every year    Pneumococcal 13 (Prevnar)  Covered Once after 65 05/10/2018 Please get once after your 65th birthday    Pneumococcal 23 (Pneumovax)  Covered Once after 65 No vaccine history found Please get once after your 65th birthday    Hepatitis B fo

## 2021-01-12 ENCOUNTER — LAB ENCOUNTER (OUTPATIENT)
Dept: LAB | Age: 76
End: 2021-01-12
Attending: FAMILY MEDICINE
Payer: MEDICARE

## 2021-01-12 DIAGNOSIS — Z86.2 HISTORY OF IRON DEFICIENCY ANEMIA: ICD-10-CM

## 2021-01-12 DIAGNOSIS — E78.2 HYPERLIPIDEMIA, MIXED: ICD-10-CM

## 2021-01-12 DIAGNOSIS — R73.9 HYPERGLYCEMIA: Primary | ICD-10-CM

## 2021-01-12 DIAGNOSIS — E55.9 VITAMIN D DEFICIENCY: ICD-10-CM

## 2021-01-12 DIAGNOSIS — I10 ESSENTIAL HYPERTENSION: ICD-10-CM

## 2021-01-12 LAB
ALBUMIN SERPL-MCNC: 3.7 G/DL (ref 3.4–5)
ALBUMIN/GLOB SERPL: 1.2 {RATIO} (ref 1–2)
ALP LIVER SERPL-CCNC: 67 U/L
ALT SERPL-CCNC: 32 U/L
ANION GAP SERPL CALC-SCNC: 8 MMOL/L (ref 0–18)
AST SERPL-CCNC: 19 U/L (ref 15–37)
BILIRUB SERPL-MCNC: 0.4 MG/DL (ref 0.1–2)
BILIRUB UR QL STRIP.AUTO: NEGATIVE
BUN BLD-MCNC: 23 MG/DL (ref 7–18)
BUN/CREAT SERPL: 17.4 (ref 10–20)
CALCIUM BLD-MCNC: 10 MG/DL (ref 8.5–10.1)
CHLORIDE SERPL-SCNC: 111 MMOL/L (ref 98–112)
CHOLEST SMN-MCNC: 156 MG/DL (ref ?–200)
CLARITY UR REFRACT.AUTO: CLEAR
CO2 SERPL-SCNC: 23 MMOL/L (ref 21–32)
COLOR UR AUTO: YELLOW
CREAT BLD-MCNC: 1.32 MG/DL
GLOBULIN PLAS-MCNC: 3 G/DL (ref 2.8–4.4)
GLUCOSE BLD-MCNC: 120 MG/DL (ref 70–99)
GLUCOSE UR STRIP.AUTO-MCNC: NEGATIVE MG/DL
HDLC SERPL-MCNC: 59 MG/DL (ref 40–59)
KETONES UR STRIP.AUTO-MCNC: NEGATIVE MG/DL
LDLC SERPL CALC-MCNC: 59 MG/DL (ref ?–100)
LEUKOCYTE ESTERASE UR QL STRIP.AUTO: NEGATIVE
M PROTEIN MFR SERPL ELPH: 6.7 G/DL (ref 6.4–8.2)
NITRITE UR QL STRIP.AUTO: NEGATIVE
NONHDLC SERPL-MCNC: 97 MG/DL (ref ?–130)
OSMOLALITY SERPL CALC.SUM OF ELEC: 299 MOSM/KG (ref 275–295)
PH UR STRIP.AUTO: 5.5 [PH] (ref 4.5–8)
POTASSIUM SERPL-SCNC: 4.4 MMOL/L (ref 3.5–5.1)
RBC UR QL AUTO: NEGATIVE
SODIUM SERPL-SCNC: 142 MMOL/L (ref 136–145)
SP GR UR STRIP.AUTO: >=1.03 (ref 1–1.03)
TRIGL SERPL-MCNC: 191 MG/DL (ref 30–149)
TSI SER-ACNC: 3.42 MIU/ML (ref 0.36–3.74)
UROBILINOGEN UR STRIP.AUTO-MCNC: 0.2 MG/DL
VIT D+METAB SERPL-MCNC: 32 NG/ML (ref 30–100)
VLDLC SERPL CALC-MCNC: 38 MG/DL (ref 0–30)

## 2021-01-12 PROCEDURE — 80061 LIPID PANEL: CPT

## 2021-01-12 PROCEDURE — 83550 IRON BINDING TEST: CPT

## 2021-01-12 PROCEDURE — 36415 COLL VENOUS BLD VENIPUNCTURE: CPT

## 2021-01-12 PROCEDURE — 81001 URINALYSIS AUTO W/SCOPE: CPT

## 2021-01-12 PROCEDURE — 82728 ASSAY OF FERRITIN: CPT

## 2021-01-12 PROCEDURE — 84443 ASSAY THYROID STIM HORMONE: CPT

## 2021-01-12 PROCEDURE — 82306 VITAMIN D 25 HYDROXY: CPT

## 2021-01-12 PROCEDURE — 83540 ASSAY OF IRON: CPT

## 2021-01-12 PROCEDURE — 80053 COMPREHEN METABOLIC PANEL: CPT

## 2021-01-13 LAB
DEPRECATED HBV CORE AB SER IA-ACNC: 141.1 NG/ML
IRON SATURATION: 23 %
IRON SERPL-MCNC: 84 UG/DL
TOTAL IRON BINDING CAPACITY: 367 UG/DL (ref 240–450)
TRANSFERRIN SERPL-MCNC: 246 MG/DL (ref 200–360)

## 2021-01-29 ENCOUNTER — HOSPITAL ENCOUNTER (OUTPATIENT)
Dept: MAMMOGRAPHY | Facility: HOSPITAL | Age: 76
Discharge: HOME | End: 2021-01-29
Attending: INTERNAL MEDICINE
Payer: MEDICARE

## 2021-01-29 DIAGNOSIS — Z85.3 HISTORY OF BREAST CANCER: ICD-10-CM

## 2021-01-29 PROCEDURE — 77066 DX MAMMO INCL CAD BI: CPT | Performed by: INTERNAL MEDICINE

## 2021-01-29 PROCEDURE — 77062 BREAST TOMOSYNTHESIS BI: CPT | Performed by: INTERNAL MEDICINE

## 2021-02-01 DIAGNOSIS — Z23 NEED FOR VACCINATION: ICD-10-CM

## 2021-03-29 DIAGNOSIS — F32.A DEPRESSION, UNSPECIFIED DEPRESSION TYPE: ICD-10-CM

## 2021-03-29 DIAGNOSIS — I10 ESSENTIAL HYPERTENSION: ICD-10-CM

## 2021-03-29 RX ORDER — AMLODIPINE BESYLATE 5 MG/1
TABLET ORAL
Qty: 90 TABLET | Refills: 0 | Status: SHIPPED | OUTPATIENT
Start: 2021-03-29 | End: 2021-06-28

## 2021-03-29 RX ORDER — ROSUVASTATIN CALCIUM 5 MG/1
TABLET, COATED ORAL
Qty: 90 TABLET | Refills: 0 | Status: SHIPPED | OUTPATIENT
Start: 2021-03-29 | End: 2021-06-28

## 2021-03-29 RX ORDER — DULOXETIN HYDROCHLORIDE 60 MG/1
CAPSULE, DELAYED RELEASE ORAL
Qty: 90 CAPSULE | Refills: 0 | Status: SHIPPED | OUTPATIENT
Start: 2021-03-29 | End: 2021-06-28

## 2021-03-29 RX ORDER — BENAZEPRIL HYDROCHLORIDE 20 MG/1
TABLET ORAL
Qty: 90 TABLET | Refills: 0 | Status: SHIPPED | OUTPATIENT
Start: 2021-03-29 | End: 2021-06-28

## 2021-03-31 RX ORDER — CELECOXIB 200 MG/1
CAPSULE ORAL
Qty: 30 CAPSULE | Refills: 0 | Status: SHIPPED | OUTPATIENT
Start: 2021-03-31 | End: 2021-04-29

## 2021-03-31 RX ORDER — CELECOXIB 200 MG/1
CAPSULE ORAL
Qty: 90 CAPSULE | Refills: 0 | OUTPATIENT
Start: 2021-03-31

## 2021-03-31 NOTE — TELEPHONE ENCOUNTER
CELECOXIB 200MG CAPSULES    None protocol medication. Please see pended medications. Please sign if appropriate.       Thank you      Last OV: 01/06/2021

## 2021-04-02 NOTE — TELEPHONE ENCOUNTER
She has an occasionally flare up from a spider bite. States she just likes to have this on hand for those occasions. Is hoping to just get a refill. Please advise. Patient if unable to.

## 2021-04-13 RX ORDER — METOPROLOL SUCCINATE 25 MG/1
25 TABLET, EXTENDED RELEASE ORAL DAILY
Qty: 90 TABLET | Refills: 3 | Status: SHIPPED | OUTPATIENT
Start: 2021-04-13

## 2021-04-29 RX ORDER — CELECOXIB 200 MG/1
CAPSULE ORAL
Qty: 90 CAPSULE | Refills: 0 | Status: SHIPPED | OUTPATIENT
Start: 2021-04-29 | End: 2021-07-22

## 2021-06-27 DIAGNOSIS — F32.A DEPRESSION, UNSPECIFIED DEPRESSION TYPE: ICD-10-CM

## 2021-06-27 DIAGNOSIS — I10 ESSENTIAL HYPERTENSION: ICD-10-CM

## 2021-06-28 RX ORDER — BENAZEPRIL HYDROCHLORIDE 20 MG/1
TABLET ORAL
Qty: 90 TABLET | Refills: 0 | Status: SHIPPED | OUTPATIENT
Start: 2021-06-28 | End: 2021-08-23

## 2021-06-28 RX ORDER — TAMOXIFEN CITRATE 20 MG/1
TABLET ORAL
Qty: 90 TABLET | Refills: 3 | Status: SHIPPED | OUTPATIENT
Start: 2021-06-28 | End: 2022-06-22

## 2021-06-28 RX ORDER — DULOXETIN HYDROCHLORIDE 60 MG/1
CAPSULE, DELAYED RELEASE ORAL
Qty: 30 CAPSULE | Refills: 0 | Status: SHIPPED | OUTPATIENT
Start: 2021-06-28 | End: 2021-08-23

## 2021-06-28 RX ORDER — ROSUVASTATIN CALCIUM 5 MG/1
TABLET, COATED ORAL
Qty: 90 TABLET | Refills: 0 | Status: SHIPPED | OUTPATIENT
Start: 2021-06-28 | End: 2021-08-23

## 2021-06-28 RX ORDER — AMLODIPINE BESYLATE 5 MG/1
TABLET ORAL
Qty: 90 TABLET | Refills: 0 | Status: SHIPPED | OUTPATIENT
Start: 2021-06-28 | End: 2021-08-23

## 2021-07-22 ENCOUNTER — TELEPHONE (OUTPATIENT)
Dept: FAMILY MEDICINE CLINIC | Facility: CLINIC | Age: 76
End: 2021-07-22

## 2021-07-22 RX ORDER — CELECOXIB 200 MG/1
CAPSULE ORAL
Qty: 30 CAPSULE | Refills: 1 | Status: SHIPPED | OUTPATIENT
Start: 2021-07-22 | End: 2021-08-23

## 2021-07-22 NOTE — TELEPHONE ENCOUNTER
Medication(s) to Refill:   Requested Prescriptions     Pending Prescriptions Disp Refills   • CELECOXIB 200 MG Oral Cap [Pharmacy Med Name: CELECOXIB 200MG CAPSULES] 90 capsule 0     Sig: TAKE 1 CAPSULE(200 MG) BY MOUTH DAILY         Reason for Medication

## 2021-07-22 NOTE — TELEPHONE ENCOUNTER
We have received request for the Celebrex medication. Patient is overdue for that visit please set up an appointment with a local refill. Okay to use next available appointment.   Thank you

## 2021-07-23 RX ORDER — CELECOXIB 200 MG/1
CAPSULE ORAL
Qty: 90 CAPSULE | Refills: 0 | OUTPATIENT
Start: 2021-07-23

## 2021-08-23 ENCOUNTER — OFFICE VISIT (OUTPATIENT)
Dept: FAMILY MEDICINE CLINIC | Facility: CLINIC | Age: 76
End: 2021-08-23
Payer: MEDICARE

## 2021-08-23 VITALS
TEMPERATURE: 97 F | HEIGHT: 67 IN | OXYGEN SATURATION: 96 % | SYSTOLIC BLOOD PRESSURE: 110 MMHG | WEIGHT: 250 LBS | RESPIRATION RATE: 22 BRPM | DIASTOLIC BLOOD PRESSURE: 58 MMHG | HEART RATE: 107 BPM | BODY MASS INDEX: 39.24 KG/M2

## 2021-08-23 DIAGNOSIS — E66.01 OBESITY, MORBID (HCC): ICD-10-CM

## 2021-08-23 DIAGNOSIS — R73.9 HYPERGLYCEMIA: ICD-10-CM

## 2021-08-23 DIAGNOSIS — D50.9 IRON DEFICIENCY ANEMIA, UNSPECIFIED IRON DEFICIENCY ANEMIA TYPE: ICD-10-CM

## 2021-08-23 DIAGNOSIS — F41.9 ANXIETY: ICD-10-CM

## 2021-08-23 DIAGNOSIS — R06.02 SHORT OF BREATH ON EXERTION: ICD-10-CM

## 2021-08-23 DIAGNOSIS — E27.8 ADRENAL NODULE (HCC): ICD-10-CM

## 2021-08-23 DIAGNOSIS — M25.562 CHRONIC PAIN OF LEFT KNEE: ICD-10-CM

## 2021-08-23 DIAGNOSIS — K44.9 HIATAL HERNIA: ICD-10-CM

## 2021-08-23 DIAGNOSIS — E78.2 HYPERLIPIDEMIA, MIXED: ICD-10-CM

## 2021-08-23 DIAGNOSIS — F32.A DEPRESSION, UNSPECIFIED DEPRESSION TYPE: ICD-10-CM

## 2021-08-23 DIAGNOSIS — I10 ESSENTIAL HYPERTENSION: Primary | ICD-10-CM

## 2021-08-23 DIAGNOSIS — E55.9 VITAMIN D DEFICIENCY: ICD-10-CM

## 2021-08-23 DIAGNOSIS — G89.29 CHRONIC PAIN OF RIGHT KNEE: ICD-10-CM

## 2021-08-23 DIAGNOSIS — Z85.3 HISTORY OF LEFT BREAST CANCER: ICD-10-CM

## 2021-08-23 DIAGNOSIS — M25.561 CHRONIC PAIN OF RIGHT KNEE: ICD-10-CM

## 2021-08-23 DIAGNOSIS — G89.29 CHRONIC PAIN OF LEFT KNEE: ICD-10-CM

## 2021-08-23 DIAGNOSIS — R53.83 FATIGUE, UNSPECIFIED TYPE: ICD-10-CM

## 2021-08-23 PROCEDURE — 99214 OFFICE O/P EST MOD 30 MIN: CPT | Performed by: FAMILY MEDICINE

## 2021-08-23 RX ORDER — ALBUTEROL SULFATE 90 UG/1
2 AEROSOL, METERED RESPIRATORY (INHALATION) EVERY 4 HOURS PRN
Qty: 1 EACH | Refills: 0 | Status: SHIPPED | OUTPATIENT
Start: 2021-08-23 | End: 2021-11-08

## 2021-08-23 RX ORDER — DULOXETIN HYDROCHLORIDE 60 MG/1
60 CAPSULE, DELAYED RELEASE ORAL DAILY
Qty: 90 CAPSULE | Refills: 1 | Status: SHIPPED | OUTPATIENT
Start: 2021-08-23 | End: 2021-12-27

## 2021-08-23 RX ORDER — CELECOXIB 200 MG/1
200 CAPSULE ORAL DAILY
Qty: 90 CAPSULE | Refills: 1 | Status: SHIPPED | OUTPATIENT
Start: 2021-08-23 | End: 2021-10-12

## 2021-08-23 RX ORDER — AMLODIPINE BESYLATE 5 MG/1
5 TABLET ORAL DAILY
Qty: 90 TABLET | Refills: 1 | Status: SHIPPED | OUTPATIENT
Start: 2021-08-23

## 2021-08-23 RX ORDER — ROSUVASTATIN CALCIUM 5 MG/1
5 TABLET, COATED ORAL EVERY EVENING
Qty: 90 TABLET | Refills: 1 | Status: SHIPPED | OUTPATIENT
Start: 2021-08-23

## 2021-08-23 RX ORDER — BENAZEPRIL HYDROCHLORIDE 20 MG/1
20 TABLET ORAL DAILY
Qty: 90 TABLET | Refills: 1 | Status: SHIPPED | OUTPATIENT
Start: 2021-08-23

## 2021-08-23 NOTE — PATIENT INSTRUCTIONS
Continue current meds. Watch diet for fats and carbs. Stay active. Call pulmonologist Dr. Rhianna Cano to set up an appointment for evaluation. Return for fasting blood work. Schedule Medicare wellness visit for January 2022.

## 2021-08-24 NOTE — PROGRESS NOTES
Nelson Felipe is a 64year old female. cc hypertension, anemia, depression/anxiety, breast cancer, hypercholesterolemia, obese shortness of breath, fatigue.,   HPI:   HTN - doing well with medications. No side effects. BP is controlled at home.  Takes besylate 5 MG Oral Tab Take 1 tablet (5 mg total) by mouth daily. 90 tablet 1   • rosuvastatin 5 MG Oral Tab Take 1 tablet (5 mg total) by mouth every evening. 90 tablet 1   • Benazepril HCl 20 MG Oral Tab Take 1 tablet (20 mg total) by mouth daily.  90 tab exertion  GI: denies abdominal pain and denies heartburn  NEURO: denies headaches  Psych stable mood,     EXAM:   /58 (BP Location: Left arm, Patient Position: Sitting, Cuff Size: large)   Pulse 107   Temp 97.1 °F (36.2 °C) (Oral)   Resp 22   Ht 5' 7 present    TSH W REFLEX TO FREE T4   Result Value Ref Range    TSH 3.420 0.358 - 3.740 mIU/mL   URINALYSIS WITH CULTURE REFLEX    Specimen: Urine   Result Value Ref Range    Urine Color Yellow Yellow    Clarity Urine Clear Clear    Spec Gravity >=1.030 1. 0 Reflex [E]      Meds & Refills for this Visit:  Requested Prescriptions     Signed Prescriptions Disp Refills   • celecoxib 200 MG Oral Cap 90 capsule 1     Sig: Take 1 capsule (200 mg total) by mouth daily.    • amLODIPine besylate 5 MG Oral Tab 90 tablet

## 2021-08-26 NOTE — TELEPHONE ENCOUNTER
Left a message for the pt to contact the myself RE: the Aldosterone. Dr. Jenni Monahan would like for me to go over the preparation for the test so the pt can decide what she want to do.

## 2021-08-26 NOTE — TELEPHONE ENCOUNTER
Orders for the blood work placed.   Patient needs to be awake for 2 hours before doing the test.  She needs to be sitting for 15 minutes before doing the test.  Thank you

## 2021-08-27 NOTE — TELEPHONE ENCOUNTER
I called and spoke to pt. I instructed her the orders for her blood work were placed.  Dr. Haresh Trinidad wants to make sure she is awake 2 hours before doing the test. She needs to be sitting for 15 minutes before doing the test. Pt agreed to plan and verbali

## 2021-08-30 ENCOUNTER — LAB ENCOUNTER (OUTPATIENT)
Dept: LAB | Age: 76
End: 2021-08-30
Attending: FAMILY MEDICINE
Payer: MEDICARE

## 2021-08-30 DIAGNOSIS — D50.9 IRON DEFICIENCY ANEMIA, UNSPECIFIED IRON DEFICIENCY ANEMIA TYPE: ICD-10-CM

## 2021-08-30 DIAGNOSIS — Z86.2 HISTORY OF IRON DEFICIENCY ANEMIA: ICD-10-CM

## 2021-08-30 DIAGNOSIS — E55.9 VITAMIN D DEFICIENCY: ICD-10-CM

## 2021-08-30 DIAGNOSIS — I10 ESSENTIAL HYPERTENSION: ICD-10-CM

## 2021-08-30 DIAGNOSIS — E78.2 HYPERLIPIDEMIA, MIXED: ICD-10-CM

## 2021-08-30 DIAGNOSIS — R53.83 FATIGUE, UNSPECIFIED TYPE: ICD-10-CM

## 2021-08-30 DIAGNOSIS — R73.9 HYPERGLYCEMIA: ICD-10-CM

## 2021-08-30 DIAGNOSIS — E27.8 ADRENAL NODULE (HCC): ICD-10-CM

## 2021-08-30 LAB
ALBUMIN SERPL-MCNC: 3.4 G/DL (ref 3.4–5)
ALBUMIN/GLOB SERPL: 0.9 {RATIO} (ref 1–2)
ALP LIVER SERPL-CCNC: 51 U/L
ALT SERPL-CCNC: 19 U/L
ANION GAP SERPL CALC-SCNC: 5 MMOL/L (ref 0–18)
AST SERPL-CCNC: 16 U/L (ref 15–37)
BASOPHILS # BLD AUTO: 0.07 X10(3) UL (ref 0–0.2)
BASOPHILS NFR BLD AUTO: 0.8 %
BILIRUB SERPL-MCNC: 0.4 MG/DL (ref 0.1–2)
BUN BLD-MCNC: 22 MG/DL (ref 7–18)
CALCIUM BLD-MCNC: 9.3 MG/DL (ref 8.5–10.1)
CHLORIDE SERPL-SCNC: 113 MMOL/L (ref 98–112)
CHOLEST SMN-MCNC: 187 MG/DL (ref ?–200)
CO2 SERPL-SCNC: 24 MMOL/L (ref 21–32)
CORTIS SERPL-MCNC: 19.2 UG/DL
CREAT BLD-MCNC: 1.13 MG/DL
DEPRECATED HBV CORE AB SER IA-ACNC: 19.6 NG/ML
EOSINOPHIL # BLD AUTO: 0.31 X10(3) UL (ref 0–0.7)
EOSINOPHIL NFR BLD AUTO: 3.6 %
ERYTHROCYTE [DISTWIDTH] IN BLOOD BY AUTOMATED COUNT: 14.2 %
EST. AVERAGE GLUCOSE BLD GHB EST-MCNC: 111 MG/DL (ref 68–126)
GLOBULIN PLAS-MCNC: 3.8 G/DL (ref 2.8–4.4)
GLUCOSE BLD-MCNC: 101 MG/DL (ref 70–99)
HBA1C MFR BLD HPLC: 5.5 % (ref ?–5.7)
HCT VFR BLD AUTO: 41.2 %
HDLC SERPL-MCNC: 64 MG/DL (ref 40–59)
HGB BLD-MCNC: 12.9 G/DL
IMM GRANULOCYTES # BLD AUTO: 0.01 X10(3) UL (ref 0–1)
IMM GRANULOCYTES NFR BLD: 0.1 %
IRON SATURATION: 15 %
IRON SERPL-MCNC: 71 UG/DL
LDLC SERPL CALC-MCNC: 88 MG/DL (ref ?–100)
LYMPHOCYTES # BLD AUTO: 2.1 X10(3) UL (ref 1–4)
LYMPHOCYTES NFR BLD AUTO: 24.5 %
M PROTEIN MFR SERPL ELPH: 7.2 G/DL (ref 6.4–8.2)
MCH RBC QN AUTO: 30.7 PG (ref 26–34)
MCHC RBC AUTO-ENTMCNC: 31.3 G/DL (ref 31–37)
MCV RBC AUTO: 98.1 FL
MONOCYTES # BLD AUTO: 0.67 X10(3) UL (ref 0.1–1)
MONOCYTES NFR BLD AUTO: 7.8 %
NEUTROPHILS # BLD AUTO: 5.41 X10 (3) UL (ref 1.5–7.7)
NEUTROPHILS # BLD AUTO: 5.41 X10(3) UL (ref 1.5–7.7)
NEUTROPHILS NFR BLD AUTO: 63.2 %
NONHDLC SERPL-MCNC: 123 MG/DL (ref ?–130)
OSMOLALITY SERPL CALC.SUM OF ELEC: 297 MOSM/KG (ref 275–295)
PATIENT FASTING Y/N/NP: YES
PATIENT FASTING Y/N/NP: YES
PLATELET # BLD AUTO: 267 10(3)UL (ref 150–450)
POTASSIUM SERPL-SCNC: 4.3 MMOL/L (ref 3.5–5.1)
RBC # BLD AUTO: 4.2 X10(6)UL
SODIUM SERPL-SCNC: 142 MMOL/L (ref 136–145)
TOTAL IRON BINDING CAPACITY: 474 UG/DL (ref 240–450)
TRANSFERRIN SERPL-MCNC: 318 MG/DL (ref 200–360)
TRIGL SERPL-MCNC: 213 MG/DL (ref 30–149)
VIT B12 SERPL-MCNC: 577 PG/ML (ref 193–986)
VIT D+METAB SERPL-MCNC: 29.3 NG/ML (ref 30–100)
VLDLC SERPL CALC-MCNC: 34 MG/DL (ref 0–30)
WBC # BLD AUTO: 8.6 X10(3) UL (ref 4–11)

## 2021-08-30 PROCEDURE — 82728 ASSAY OF FERRITIN: CPT

## 2021-08-30 PROCEDURE — 83835 ASSAY OF METANEPHRINES: CPT

## 2021-08-30 PROCEDURE — 82088 ASSAY OF ALDOSTERONE: CPT

## 2021-08-30 PROCEDURE — 85025 COMPLETE CBC W/AUTO DIFF WBC: CPT

## 2021-08-30 PROCEDURE — 83036 HEMOGLOBIN GLYCOSYLATED A1C: CPT

## 2021-08-30 PROCEDURE — 83550 IRON BINDING TEST: CPT

## 2021-08-30 PROCEDURE — 84244 ASSAY OF RENIN: CPT

## 2021-08-30 PROCEDURE — 36415 COLL VENOUS BLD VENIPUNCTURE: CPT

## 2021-08-30 PROCEDURE — 82533 TOTAL CORTISOL: CPT

## 2021-08-30 PROCEDURE — 80061 LIPID PANEL: CPT

## 2021-08-30 PROCEDURE — 82607 VITAMIN B-12: CPT

## 2021-08-30 PROCEDURE — 82306 VITAMIN D 25 HYDROXY: CPT

## 2021-08-30 PROCEDURE — 80053 COMPREHEN METABOLIC PANEL: CPT

## 2021-08-30 PROCEDURE — 83540 ASSAY OF IRON: CPT

## 2021-09-02 LAB
METANEPHRINE: 0.1 NMOL/L
NORMETANEPHRINE: 1.85 NMOL/L

## 2021-09-04 LAB
ALDOSTERONE/RENIN ACTIVITY RATIO: 4.3 RATIO
ALDOSTERONE: 12.6 NG/DL
RENIN ACTIVITY: 2.9 NG/ML/HR

## 2021-09-09 ENCOUNTER — OFFICE VISIT (OUTPATIENT)
Dept: HEMATOLOGY/ONCOLOGY | Facility: HOSPITAL | Age: 76
End: 2021-09-09
Attending: INTERNAL MEDICINE
Payer: MEDICARE

## 2021-09-09 VITALS
DIASTOLIC BLOOD PRESSURE: 65 MMHG | SYSTOLIC BLOOD PRESSURE: 93 MMHG | HEART RATE: 87 BPM | OXYGEN SATURATION: 97 % | RESPIRATION RATE: 18 BRPM | TEMPERATURE: 98 F

## 2021-09-09 DIAGNOSIS — D50.0 IRON DEFICIENCY ANEMIA DUE TO CHRONIC BLOOD LOSS: Primary | ICD-10-CM

## 2021-09-09 PROCEDURE — 96365 THER/PROPH/DIAG IV INF INIT: CPT

## 2021-09-09 NOTE — PROGRESS NOTES
Education Record    Learner:  Patient    Disease / Diagnosis: JAYSON    Barriers / Limitations:  None   Comments:    Method:  Brief focused   Comments:    General Topics:  Plan of care reviewed   Comments:    Outcome:  Shows understanding   Comments:    Milo

## 2021-09-14 ENCOUNTER — TELEPHONE (OUTPATIENT)
Dept: FAMILY MEDICINE CLINIC | Facility: CLINIC | Age: 76
End: 2021-09-14

## 2021-09-14 DIAGNOSIS — E55.9 VITAMIN D DEFICIENCY: ICD-10-CM

## 2021-09-14 DIAGNOSIS — E27.8 ADRENAL NODULE (HCC): Primary | ICD-10-CM

## 2021-09-14 RX ORDER — ERGOCALCIFEROL 1.25 MG/1
50000 CAPSULE ORAL WEEKLY
Qty: 12 CAPSULE | Refills: 0 | Status: SHIPPED | OUTPATIENT
Start: 2021-09-14

## 2021-10-12 DIAGNOSIS — G89.29 CHRONIC PAIN OF RIGHT KNEE: ICD-10-CM

## 2021-10-12 DIAGNOSIS — G89.29 CHRONIC PAIN OF LEFT KNEE: ICD-10-CM

## 2021-10-12 DIAGNOSIS — M25.561 CHRONIC PAIN OF RIGHT KNEE: ICD-10-CM

## 2021-10-12 DIAGNOSIS — M25.562 CHRONIC PAIN OF LEFT KNEE: ICD-10-CM

## 2021-10-12 RX ORDER — CELECOXIB 200 MG/1
CAPSULE ORAL
Qty: 90 CAPSULE | Refills: 1 | Status: SHIPPED | OUTPATIENT
Start: 2021-10-12

## 2021-10-12 NOTE — TELEPHONE ENCOUNTER
CELECOXIB 200MG CAPSULES    LOV:8/23/2021    LAST REFILL:8/23/2021  QTY: 90/ 1 refill    UPCOMING APPT: N/A      Please sign if appropriate. Thank You!

## 2021-11-01 ENCOUNTER — HOSPITAL ENCOUNTER (OUTPATIENT)
Dept: CT IMAGING | Facility: HOSPITAL | Age: 76
Discharge: HOME | End: 2021-11-01
Attending: FAMILY MEDICINE
Payer: MEDICARE

## 2021-11-01 DIAGNOSIS — E27.8 ADRENAL NODULE (HCC): ICD-10-CM

## 2021-11-01 PROCEDURE — 74150 CT ABDOMEN W/O CONTRAST: CPT | Performed by: FAMILY MEDICINE

## 2021-11-04 ENCOUNTER — TELEPHONE (OUTPATIENT)
Dept: FAMILY MEDICINE CLINIC | Facility: CLINIC | Age: 76
End: 2021-11-04

## 2021-11-04 DIAGNOSIS — D35.02 BILATERAL ADRENAL ADENOMAS: Primary | ICD-10-CM

## 2021-11-04 DIAGNOSIS — D35.01 BILATERAL ADRENAL ADENOMAS: Primary | ICD-10-CM

## 2021-11-04 NOTE — TELEPHONE ENCOUNTER
See order pended for future CT abdomen for monitoring b/l adrenal adenomas. Asking about if necessary can they use contrast?  Please review order and sign. Thanks.

## 2021-11-05 ENCOUNTER — TELEPHONE (OUTPATIENT)
Dept: FAMILY MEDICINE CLINIC | Facility: CLINIC | Age: 76
End: 2021-11-05

## 2021-11-05 NOTE — TELEPHONE ENCOUNTER
1. What are your symptoms? Balance issues     2. How long have you been having these symptoms? 1 mo     3. Have you done anything already to treat your symptoms?          ADDITIONAL INFO: '    On bp meds

## 2021-11-05 NOTE — TELEPHONE ENCOUNTER
Pt transferred to nurse. Reports having intermittent balance issues past month. Yesterday reports she went to get up from her chair and fell back into it. Feels it was more of a clumsy motion. Denies any dizzy/light headed. No cp. No sob.  Said she wonder

## 2021-11-08 ENCOUNTER — OFFICE VISIT (OUTPATIENT)
Dept: FAMILY MEDICINE CLINIC | Facility: CLINIC | Age: 76
End: 2021-11-08
Payer: MEDICARE

## 2021-11-08 ENCOUNTER — EKG ENCOUNTER (OUTPATIENT)
Dept: LAB | Age: 76
End: 2021-11-08
Attending: FAMILY MEDICINE
Payer: MEDICARE

## 2021-11-08 ENCOUNTER — LAB ENCOUNTER (OUTPATIENT)
Dept: LAB | Age: 76
End: 2021-11-08
Attending: FAMILY MEDICINE
Payer: MEDICARE

## 2021-11-08 VITALS
TEMPERATURE: 98 F | HEART RATE: 104 BPM | DIASTOLIC BLOOD PRESSURE: 82 MMHG | BODY MASS INDEX: 39 KG/M2 | RESPIRATION RATE: 18 BRPM | HEIGHT: 67 IN | SYSTOLIC BLOOD PRESSURE: 126 MMHG | OXYGEN SATURATION: 97 %

## 2021-11-08 DIAGNOSIS — E78.2 HYPERLIPIDEMIA, MIXED: ICD-10-CM

## 2021-11-08 DIAGNOSIS — H53.9 VISION ABNORMALITIES: ICD-10-CM

## 2021-11-08 DIAGNOSIS — I10 ESSENTIAL HYPERTENSION: ICD-10-CM

## 2021-11-08 DIAGNOSIS — R71.8 ELEVATED MCV: ICD-10-CM

## 2021-11-08 DIAGNOSIS — E66.01 OBESITY, MORBID (HCC): ICD-10-CM

## 2021-11-08 DIAGNOSIS — R06.02 SHORT OF BREATH ON EXERTION: ICD-10-CM

## 2021-11-08 DIAGNOSIS — Z23 NEED FOR VACCINATION: ICD-10-CM

## 2021-11-08 DIAGNOSIS — R26.89 BALANCE PROBLEMS: Primary | ICD-10-CM

## 2021-11-08 DIAGNOSIS — R26.89 BALANCE PROBLEMS: ICD-10-CM

## 2021-11-08 DIAGNOSIS — R00.0 TACHYCARDIA: ICD-10-CM

## 2021-11-08 PROCEDURE — 86038 ANTINUCLEAR ANTIBODIES: CPT

## 2021-11-08 PROCEDURE — 84443 ASSAY THYROID STIM HORMONE: CPT

## 2021-11-08 PROCEDURE — G0008 ADMIN INFLUENZA VIRUS VAC: HCPCS | Performed by: FAMILY MEDICINE

## 2021-11-08 PROCEDURE — 99215 OFFICE O/P EST HI 40 MIN: CPT | Performed by: FAMILY MEDICINE

## 2021-11-08 PROCEDURE — 93010 ELECTROCARDIOGRAM REPORT: CPT | Performed by: INTERNAL MEDICINE

## 2021-11-08 PROCEDURE — 82607 VITAMIN B-12: CPT

## 2021-11-08 PROCEDURE — 80053 COMPREHEN METABOLIC PANEL: CPT

## 2021-11-08 PROCEDURE — 83735 ASSAY OF MAGNESIUM: CPT

## 2021-11-08 PROCEDURE — 81003 URINALYSIS AUTO W/O SCOPE: CPT

## 2021-11-08 PROCEDURE — 90662 IIV NO PRSV INCREASED AG IM: CPT | Performed by: FAMILY MEDICINE

## 2021-11-08 PROCEDURE — 85025 COMPLETE CBC W/AUTO DIFF WBC: CPT

## 2021-11-08 PROCEDURE — 93005 ELECTROCARDIOGRAM TRACING: CPT

## 2021-11-08 NOTE — TELEPHONE ENCOUNTER
S/w pt. Sts she was already contacted and appt was scheduled for today, 11/08/21, at 11:00 AM.  Confirmed above.

## 2021-11-08 NOTE — PATIENT INSTRUCTIONS
Do EKG and blood work and urine test today. Call to schedule MRI of your brain. See Dr. Trent Gallagher for evaluation of your symptoms. See ophthalmologist Dr. Mago Pérez for eye examination. Try to drink more water.   Further recommendation pending results of

## 2021-11-08 NOTE — PROGRESS NOTES
Concha Lozano is a 64year old female. cc balance problem, vision abnormalities,  shortness of breath on exertion, tachycardia, hypertension hyperlipidemia, elevated MCV  HPI:   Patient is coming to the office complaining of having problem of her Take 1 capsule (50,000 Units total) by mouth once a week. 12 capsule 0   • amLODIPine besylate 5 MG Oral Tab Take 1 tablet (5 mg total) by mouth daily. 90 tablet 1   • rosuvastatin 5 MG Oral Tab Take 1 tablet (5 mg total) by mouth every evening.  90 tablet (Oral)   Resp 18   Ht 5' 7\" (1.702 m)   SpO2 97%   Breastfeeding No   BMI 39.16 kg/m²   GENERAL: well developed, well nourished,in no apparent distress,   SKIN: no rashes,no suspicious lesions  HEENT: atraumatic, normocephalic,ears retracted tympanic memb -American 54 (L) >=60    AST 16 15 - 37 U/L    ALT 19 13 - 56 U/L    Alkaline Phosphatase 51 (L) 55 - 142 U/L    Bilirubin, Total 0.4 0.1 - 2.0 mg/dL    Total Protein 7.2 6.4 - 8.2 g/dL    Albumin 3.4 3.4 - 5.0 g/dL    Globulin  3.8 2.8 - 4.4 g/dL Placed This Encounter      CBC With Differential With Platelet      Comp Metabolic Panel (14)      TSH W Reflex To Free T4      UA/M With Culture Reflex [E]      Magnesium [E]      Lia, Direct, Reflex To 9 Enas [E]      Vitamin B12      FLU VACC HIGH DOSE

## 2021-11-12 ENCOUNTER — OFFICE VISIT (OUTPATIENT)
Dept: HEMATOLOGY/ONCOLOGY | Facility: HOSPITAL | Age: 76
End: 2021-11-12
Attending: INTERNAL MEDICINE
Payer: MEDICARE

## 2021-11-12 VITALS
DIASTOLIC BLOOD PRESSURE: 84 MMHG | RESPIRATION RATE: 16 BRPM | HEART RATE: 124 BPM | SYSTOLIC BLOOD PRESSURE: 134 MMHG | TEMPERATURE: 97 F | OXYGEN SATURATION: 94 %

## 2021-11-12 DIAGNOSIS — Z85.3 HISTORY OF BREAST CANCER: ICD-10-CM

## 2021-11-12 DIAGNOSIS — Z17.0 MALIGNANT NEOPLASM OF RIGHT BREAST IN FEMALE, ESTROGEN RECEPTOR POSITIVE, UNSPECIFIED SITE OF BREAST (HCC): ICD-10-CM

## 2021-11-12 DIAGNOSIS — C50.911 MALIGNANT NEOPLASM OF RIGHT BREAST IN FEMALE, ESTROGEN RECEPTOR POSITIVE, UNSPECIFIED SITE OF BREAST (HCC): ICD-10-CM

## 2021-11-12 DIAGNOSIS — Z86.39 HISTORY OF IRON DEFICIENCY: Primary | ICD-10-CM

## 2021-11-12 PROCEDURE — 99213 OFFICE O/P EST LOW 20 MIN: CPT | Performed by: INTERNAL MEDICINE

## 2021-11-15 NOTE — PROGRESS NOTES
[facility]    PATIENT'S NAME: Jaki Darnell   ATTENDING PHYSICIAN: Dee Buckley M.D.    PATIENT ACCOUNT #: [de-identified] LOCATION: [address] [place] [address] RECORD #: [id number] YOB: 1945   DATE OF SERVICE: 11/12/2021       CAN 124, respiratory rate is 20, temperature is 97.3. HEENT:  Unremarkable. LYMPHATICS:  She has no adenopathy. LUNGS:  Clear. HEART:  Normal.  ABDOMEN:  No hepatosplenomegaly or tenderness. EXTREMITIES:  No clubbing, cyanosis, or edema.   NEUROLOGIC:  S

## 2021-11-25 ENCOUNTER — HOSPITAL ENCOUNTER (EMERGENCY)
Facility: HOSPITAL | Age: 76
Discharge: HOME | End: 2021-11-25
Attending: EMERGENCY MEDICINE
Payer: MEDICARE

## 2021-11-25 ENCOUNTER — APPOINTMENT (OUTPATIENT)
Dept: GENERAL RADIOLOGY | Facility: HOSPITAL | Age: 76
End: 2021-11-25
Attending: EMERGENCY MEDICINE
Payer: MEDICARE

## 2021-11-25 ENCOUNTER — APPOINTMENT (OUTPATIENT)
Dept: ULTRASOUND IMAGING | Facility: HOSPITAL | Age: 76
End: 2021-11-25
Attending: EMERGENCY MEDICINE
Payer: MEDICARE

## 2021-11-25 VITALS
DIASTOLIC BLOOD PRESSURE: 64 MMHG | OXYGEN SATURATION: 93 % | SYSTOLIC BLOOD PRESSURE: 112 MMHG | WEIGHT: 251.31 LBS | HEIGHT: 66 IN | BODY MASS INDEX: 40.39 KG/M2 | HEART RATE: 86 BPM | RESPIRATION RATE: 22 BRPM

## 2021-11-25 DIAGNOSIS — K44.9 HIATAL HERNIA: ICD-10-CM

## 2021-11-25 DIAGNOSIS — K80.50 BILIARY COLIC: Primary | ICD-10-CM

## 2021-11-25 PROCEDURE — 84484 ASSAY OF TROPONIN QUANT: CPT | Performed by: EMERGENCY MEDICINE

## 2021-11-25 PROCEDURE — 99284 EMERGENCY DEPT VISIT MOD MDM: CPT

## 2021-11-25 PROCEDURE — 36415 COLL VENOUS BLD VENIPUNCTURE: CPT

## 2021-11-25 PROCEDURE — 85025 COMPLETE CBC W/AUTO DIFF WBC: CPT | Performed by: EMERGENCY MEDICINE

## 2021-11-25 PROCEDURE — 76700 US EXAM ABDOM COMPLETE: CPT | Performed by: EMERGENCY MEDICINE

## 2021-11-25 PROCEDURE — 93005 ELECTROCARDIOGRAM TRACING: CPT

## 2021-11-25 PROCEDURE — 93010 ELECTROCARDIOGRAM REPORT: CPT

## 2021-11-25 PROCEDURE — 71045 X-RAY EXAM CHEST 1 VIEW: CPT | Performed by: EMERGENCY MEDICINE

## 2021-11-25 PROCEDURE — 83690 ASSAY OF LIPASE: CPT | Performed by: EMERGENCY MEDICINE

## 2021-11-25 PROCEDURE — 99285 EMERGENCY DEPT VISIT HI MDM: CPT

## 2021-11-25 PROCEDURE — 80053 COMPREHEN METABOLIC PANEL: CPT | Performed by: EMERGENCY MEDICINE

## 2021-11-25 RX ORDER — DIPHENHYDRAMINE HYDROCHLORIDE 50 MG/ML
50 INJECTION INTRAMUSCULAR; INTRAVENOUS ONCE
Status: DISCONTINUED | OUTPATIENT
Start: 2021-11-25 | End: 2021-11-25

## 2021-11-25 RX ORDER — DICYCLOMINE HCL 20 MG
20 TABLET ORAL 4 TIMES DAILY PRN
Qty: 30 TABLET | Refills: 0 | Status: SHIPPED | OUTPATIENT
Start: 2021-11-25 | End: 2021-12-25

## 2021-11-25 RX ORDER — ASPIRIN 81 MG/1
324 TABLET, CHEWABLE ORAL ONCE
Status: DISCONTINUED | OUTPATIENT
Start: 2021-11-25 | End: 2021-11-25

## 2021-11-25 RX ORDER — METOCLOPRAMIDE HYDROCHLORIDE 5 MG/ML
10 INJECTION INTRAMUSCULAR; INTRAVENOUS ONCE
Status: DISCONTINUED | OUTPATIENT
Start: 2021-11-25 | End: 2021-11-25

## 2021-11-25 RX ORDER — ONDANSETRON 4 MG/1
4 TABLET, ORALLY DISINTEGRATING ORAL EVERY 4 HOURS PRN
Qty: 10 TABLET | Refills: 0 | Status: SHIPPED | OUTPATIENT
Start: 2021-11-25

## 2021-11-26 NOTE — ED PROVIDER NOTES
Patient Seen in: [facility] Emergency Department      History   Patient presents with:  Chest Pain Angina    Stated Complaint: chest pain    Subjective:   HPI    42-year-old female presents now with chest pain but actually she had abdominal pain.   Allegra Cortés Procedure: ESOPHAGOGASTRODUODENOSCOPY, COLONOSCOPY, POSSIBLE BIOPSY, POSSIBLE POLYPECTOMY [id number];  Surgeon:  Jennifer Vu MD;  Location: [address] [place] [address]   • PATIENT [address] [place] [address] FOR IV ANTIBIOTIC SURGICAL SITE INFECTION MI heart sounds. Pulmonary:      Effort: Pulmonary effort is normal.      Breath sounds: Normal breath sounds. No stridor. No wheezing. Abdominal:      General: Bowel sounds are normal.      Palpations: Abdomen is soft. Tenderness:  There is abdominal these tests on the individual orders. RAINBOW DRAW LAVENDER   RAINBOW DRAW LIGHT GREEN   CBC W/ DIFFERENTIAL     EKG    Rate, intervals and axes as noted on EKG Report.   Rate: 88  Rhythm: Sinus Rhythm  Reading: No acute ST segment elevation no interval c transcribed by Technologist)  Patient offered no additional history at this time. FINDINGS:  Large hiatal hernia redemonstrated with the transverse dimension of the hiatal hernia wider than the heart, 16.9 cm.   This large hiatal hernia was present befo [id number]  [phone]    Schedule an appointment as soon as possible for a visit            Medications Prescribed:  Discharge Medication List as of 11/25/2021  8:59 PM    START taking these medications    ondansetron 4 MG Oral Tablet Dispersible  Take 1 tab

## 2021-11-26 NOTE — ED INITIAL ASSESSMENT (HPI)
Patient was eating turkey when she had a sudden onset of chest pain that radiated into abdomen. Patient felt nauseous and vomit x3. No diarrhea.

## 2021-12-24 DIAGNOSIS — F32.A DEPRESSION, UNSPECIFIED DEPRESSION TYPE: ICD-10-CM

## 2021-12-27 RX ORDER — DULOXETIN HYDROCHLORIDE 60 MG/1
CAPSULE, DELAYED RELEASE ORAL
Qty: 90 CAPSULE | Refills: 0 | Status: SHIPPED | OUTPATIENT
Start: 2021-12-27

## 2021-12-27 NOTE — TELEPHONE ENCOUNTER
DULOXETINE DR 60MG CAPSULES      Please see pended medications. Please sign if appropriate. Thank you        Last OV: 11/08/2021       last Refill: 08/23/2021 for 90/1 refill.

## 2022-02-01 ENCOUNTER — TELEPHONE (OUTPATIENT)
Dept: HEMATOLOGY/ONCOLOGY | Facility: HOSPITAL | Age: 77
End: 2022-02-01

## 2022-02-01 ENCOUNTER — HOSPITAL ENCOUNTER (OUTPATIENT)
Dept: MAMMOGRAPHY | Facility: HOSPITAL | Age: 77
Discharge: HOME | End: 2022-02-01
Attending: INTERNAL MEDICINE
Payer: MEDICARE

## 2022-02-01 DIAGNOSIS — Z85.3 HISTORY OF BREAST CANCER: ICD-10-CM

## 2022-02-01 PROCEDURE — 77062 BREAST TOMOSYNTHESIS BI: CPT | Performed by: INTERNAL MEDICINE

## 2022-02-01 PROCEDURE — 77066 DX MAMMO INCL CAD BI: CPT | Performed by: INTERNAL MEDICINE

## 2022-02-01 PROCEDURE — 76642 ULTRASOUND BREAST LIMITED: CPT | Performed by: INTERNAL MEDICINE

## 2022-02-01 NOTE — IMAGING NOTE
This Breast Care RN assisted Dr. Laurie Blount with recommendation for a right breast 1 site stereotactic biopsy for distortion. Procedure reviewed and all questions answered. Emotional and educational support given. On the day of the biopsy, pt instructed to take Tylenol 1000mg PO, eat a light meal & bring or wear a sports bra. Post biopsy care also reviewed with pt to include NO lifting more than 5lbs, no exercising or housework (limit upper body movement) for 24-48 hrs post biopsy. Patient denies blood thinners, bleeding disorders, liver disease, and chemo. Pt verbalized understanding. Our breast center schedulers will be calling to schedule an appt that is convenient for pt.

## 2022-02-10 ENCOUNTER — PATIENT OUTREACH (OUTPATIENT)
Dept: CASE MANAGEMENT | Age: 77
End: 2022-02-10

## 2022-02-15 ENCOUNTER — PATIENT OUTREACH (OUTPATIENT)
Dept: CASE MANAGEMENT | Age: 77
End: 2022-02-15

## 2022-02-17 RX ORDER — CELECOXIB 200 MG/1
CAPSULE ORAL
Qty: 90 CAPSULE | Refills: 0 | Status: SHIPPED | OUTPATIENT
Start: 2022-02-17

## 2022-02-17 NOTE — TELEPHONE ENCOUNTER
LOV: 11/8/21 (balance issues)  Last Refill: 10/12/21, #90, 1 RF  Next OV: N/A    Please authorize if acceptable. Thank you!    [facility] sent to pt to schedule an annual.

## 2022-02-22 ENCOUNTER — HOSPITAL ENCOUNTER (OUTPATIENT)
Dept: MAMMOGRAPHY | Facility: HOSPITAL | Age: 77
Discharge: HOME | End: 2022-02-22
Attending: INTERNAL MEDICINE
Payer: MEDICARE

## 2022-02-22 DIAGNOSIS — Z85.3 HISTORY OF BREAST CANCER: ICD-10-CM

## 2022-02-22 DIAGNOSIS — R92.8 ABNORMAL MAMMOGRAM: ICD-10-CM

## 2022-02-22 PROCEDURE — 88341 IMHCHEM/IMCYTCHM EA ADD ANTB: CPT | Performed by: INTERNAL MEDICINE

## 2022-02-22 PROCEDURE — 88344 IMHCHEM/IMCYTCHM EA MLT ANTB: CPT | Performed by: INTERNAL MEDICINE

## 2022-02-22 PROCEDURE — 88305 TISSUE EXAM BY PATHOLOGIST: CPT | Performed by: INTERNAL MEDICINE

## 2022-02-22 PROCEDURE — 88342 IMHCHEM/IMCYTCHM 1ST ANTB: CPT | Performed by: INTERNAL MEDICINE

## 2022-02-22 PROCEDURE — 19499 UNLISTED PROCEDURE BREAST: CPT | Performed by: INTERNAL MEDICINE

## 2022-02-28 PROCEDURE — 99490 CHRNC CARE MGMT STAFF 1ST 20: CPT

## 2022-02-28 PROCEDURE — 99439 CHRNC CARE MGMT STAF EA ADDL: CPT

## 2022-03-07 ENCOUNTER — MED REC SCAN ONLY (OUTPATIENT)
Dept: FAMILY MEDICINE CLINIC | Facility: CLINIC | Age: 77
End: 2022-03-07

## 2022-03-18 ENCOUNTER — PATIENT OUTREACH (OUTPATIENT)
Dept: CASE MANAGEMENT | Age: 77
End: 2022-03-18

## 2022-03-18 NOTE — PROGRESS NOTES
Contacting patient to follow up on CCM for the month. Briefly spoke with patient, but she was occupied and would like talk another time. Patient states she will call back at a more convenient time.      Total time -  5 min  Total Monthly time- 5  min

## 2022-03-25 RX ORDER — ROSUVASTATIN CALCIUM 5 MG/1
TABLET, COATED ORAL
Qty: 90 TABLET | Refills: 0 | Status: SHIPPED | OUTPATIENT
Start: 2022-03-25

## 2022-03-25 RX ORDER — BENAZEPRIL HYDROCHLORIDE 20 MG/1
TABLET ORAL
Qty: 90 TABLET | Refills: 0 | Status: SHIPPED | OUTPATIENT
Start: 2022-03-25

## 2022-03-25 RX ORDER — AMLODIPINE BESYLATE 5 MG/1
TABLET ORAL
Qty: 90 TABLET | Refills: 0 | Status: SHIPPED | OUTPATIENT
Start: 2022-03-25

## 2022-04-08 ENCOUNTER — LAB ENCOUNTER (OUTPATIENT)
Dept: LAB | Age: 77
End: 2022-04-08
Attending: INTERNAL MEDICINE
Payer: MEDICARE

## 2022-04-08 DIAGNOSIS — R06.00 DYSPNEA, PAROXYSMAL NOCTURNAL: Primary | ICD-10-CM

## 2022-04-08 DIAGNOSIS — E78.2 MIXED HYPERLIPIDEMIA: ICD-10-CM

## 2022-04-08 DIAGNOSIS — D35.01 BILATERAL ADRENAL ADENOMAS: ICD-10-CM

## 2022-04-08 DIAGNOSIS — I10 ESSENTIAL HYPERTENSION: ICD-10-CM

## 2022-04-08 DIAGNOSIS — I10 ESSENTIAL HYPERTENSION, MALIGNANT: ICD-10-CM

## 2022-04-08 DIAGNOSIS — E55.9 VITAMIN D DEFICIENCY: ICD-10-CM

## 2022-04-08 DIAGNOSIS — Z98.890 PERSONAL HISTORY OF SURGERY TO HEART AND GREAT VESSELS, PRESENTING HAZARDS TO HEALTH: ICD-10-CM

## 2022-04-08 DIAGNOSIS — I25.10 CORONARY ATHEROSCLEROSIS OF NATIVE CORONARY ARTERY: ICD-10-CM

## 2022-04-08 DIAGNOSIS — D35.02 BILATERAL ADRENAL ADENOMAS: ICD-10-CM

## 2022-04-08 DIAGNOSIS — Z86.39 HISTORY OF IRON DEFICIENCY: ICD-10-CM

## 2022-04-08 LAB
ALBUMIN SERPL-MCNC: 3.5 G/DL (ref 3.4–5)
ALBUMIN/GLOB SERPL: 1.1 {RATIO} (ref 1–2)
ALP LIVER SERPL-CCNC: 52 U/L
ALT SERPL-CCNC: 19 U/L
ANION GAP SERPL CALC-SCNC: 7 MMOL/L (ref 0–18)
AST SERPL-CCNC: 17 U/L (ref 15–37)
BASOPHILS # BLD AUTO: 0.1 X10(3) UL (ref 0–0.2)
BASOPHILS NFR BLD AUTO: 1.1 %
BILIRUB SERPL-MCNC: 0.5 MG/DL (ref 0.1–2)
BUN BLD-MCNC: 23 MG/DL (ref 7–18)
CALCIUM BLD-MCNC: 9.7 MG/DL (ref 8.5–10.1)
CHLORIDE SERPL-SCNC: 113 MMOL/L (ref 98–112)
CHOLEST SERPL-MCNC: 162 MG/DL (ref ?–200)
CO2 SERPL-SCNC: 23 MMOL/L (ref 21–32)
CORTIS SERPL-MCNC: 27.6 UG/DL
CREAT BLD-MCNC: 1.15 MG/DL
DEPRECATED HBV CORE AB SER IA-ACNC: 65.7 NG/ML
EOSINOPHIL # BLD AUTO: 0.35 X10(3) UL (ref 0–0.7)
EOSINOPHIL NFR BLD AUTO: 3.9 %
ERYTHROCYTE [DISTWIDTH] IN BLOOD BY AUTOMATED COUNT: 13.6 %
FASTING PATIENT LIPID ANSWER: YES
FASTING STATUS PATIENT QL REPORTED: YES
GLOBULIN PLAS-MCNC: 3.2 G/DL (ref 2.8–4.4)
GLUCOSE BLD-MCNC: 135 MG/DL (ref 70–99)
HCT VFR BLD AUTO: 43.3 %
HDLC SERPL-MCNC: 57 MG/DL (ref 40–59)
HGB BLD-MCNC: 13.7 G/DL
IMM GRANULOCYTES # BLD AUTO: 0.03 X10(3) UL (ref 0–1)
IMM GRANULOCYTES NFR BLD: 0.3 %
IRON SATN MFR SERPL: 21 %
IRON SERPL-MCNC: 92 UG/DL
LDLC SERPL CALC-MCNC: 73 MG/DL (ref ?–100)
LYMPHOCYTES # BLD AUTO: 2.51 X10(3) UL (ref 1–4)
LYMPHOCYTES NFR BLD AUTO: 28 %
MCH RBC QN AUTO: 30.4 PG (ref 26–34)
MCHC RBC AUTO-ENTMCNC: 31.6 G/DL (ref 31–37)
MCV RBC AUTO: 96.2 FL
MONOCYTES # BLD AUTO: 0.64 X10(3) UL (ref 0.1–1)
MONOCYTES NFR BLD AUTO: 7.1 %
NEUTROPHILS # BLD AUTO: 5.34 X10 (3) UL (ref 1.5–7.7)
NEUTROPHILS # BLD AUTO: 5.34 X10(3) UL (ref 1.5–7.7)
NEUTROPHILS NFR BLD AUTO: 59.6 %
NONHDLC SERPL-MCNC: 105 MG/DL (ref ?–130)
NT-PROBNP SERPL-MCNC: 65 PG/ML (ref ?–450)
OSMOLALITY SERPL CALC.SUM OF ELEC: 302 MOSM/KG (ref 275–295)
PLATELET # BLD AUTO: 284 10(3)UL (ref 150–450)
POTASSIUM SERPL-SCNC: 4.7 MMOL/L (ref 3.5–5.1)
PROT SERPL-MCNC: 6.7 G/DL (ref 6.4–8.2)
RBC # BLD AUTO: 4.5 X10(6)UL
SODIUM SERPL-SCNC: 143 MMOL/L (ref 136–145)
TIBC SERPL-MCNC: 435 UG/DL (ref 240–450)
TRANSFERRIN SERPL-MCNC: 292 MG/DL (ref 200–360)
TRIGL SERPL-MCNC: 195 MG/DL (ref 30–149)
TSI SER-ACNC: 3.28 MIU/ML (ref 0.36–3.74)
VIT D+METAB SERPL-MCNC: 32.3 NG/ML (ref 30–100)
VLDLC SERPL CALC-MCNC: 30 MG/DL (ref 0–30)
WBC # BLD AUTO: 9 X10(3) UL (ref 4–11)

## 2022-04-08 PROCEDURE — 82088 ASSAY OF ALDOSTERONE: CPT

## 2022-04-08 PROCEDURE — 83835 ASSAY OF METANEPHRINES: CPT

## 2022-04-08 PROCEDURE — 83550 IRON BINDING TEST: CPT

## 2022-04-08 PROCEDURE — 85025 COMPLETE CBC W/AUTO DIFF WBC: CPT

## 2022-04-08 PROCEDURE — 83540 ASSAY OF IRON: CPT

## 2022-04-08 PROCEDURE — 83880 ASSAY OF NATRIURETIC PEPTIDE: CPT

## 2022-04-08 PROCEDURE — 82306 VITAMIN D 25 HYDROXY: CPT

## 2022-04-08 PROCEDURE — 80053 COMPREHEN METABOLIC PANEL: CPT

## 2022-04-08 PROCEDURE — 82533 TOTAL CORTISOL: CPT

## 2022-04-08 PROCEDURE — 84443 ASSAY THYROID STIM HORMONE: CPT

## 2022-04-08 PROCEDURE — 84244 ASSAY OF RENIN: CPT

## 2022-04-08 PROCEDURE — 82728 ASSAY OF FERRITIN: CPT

## 2022-04-08 PROCEDURE — 80061 LIPID PANEL: CPT

## 2022-04-18 ENCOUNTER — PATIENT OUTREACH (OUTPATIENT)
Dept: CASE MANAGEMENT | Age: 77
End: 2022-04-18

## 2022-04-18 NOTE — PROGRESS NOTES
Attempted to reach patient for CCM monthly outreach call. LMTCB    Total time: 5 Minutes  Total Monthly time: 5 Minutes  Patient's medical record reviewed, including recent OV notes and test results.

## 2022-04-21 ENCOUNTER — MED REC SCAN ONLY (OUTPATIENT)
Dept: FAMILY MEDICINE CLINIC | Facility: CLINIC | Age: 77
End: 2022-04-21

## 2022-04-27 LAB
ALDOSTERONE/RENIN ACTIVITY RATIO: 1.1 RATIO
ALDOSTERONE: 19.4 NG/DL
METANEPHRINE: 0.12 NMOL/L
NORMETANEPHRINE: 2.53 NMOL/L
RENIN ACTIVITY: 17.6 NG/ML/HR

## 2022-05-11 ENCOUNTER — TELEPHONE (OUTPATIENT)
Dept: FAMILY MEDICINE CLINIC | Facility: CLINIC | Age: 77
End: 2022-05-11

## 2022-05-11 NOTE — TELEPHONE ENCOUNTER
Pt is requesting appt before August. She had labs done with another Dr and her blood sugar was 135. She is also having gallbladder problems. Please advise. Thank you.

## 2022-05-11 NOTE — TELEPHONE ENCOUNTER
Okay to use SDA in June /July for the patient ,  please schedule this is a [employer]. She is overdue for Medicare visit.   Thank you ,

## 2022-05-13 ENCOUNTER — PATIENT OUTREACH (OUTPATIENT)
Dept: CASE MANAGEMENT | Age: 77
End: 2022-05-13

## 2022-05-17 ENCOUNTER — TELEPHONE (OUTPATIENT)
Dept: ORTHOPEDICS CLINIC | Facility: CLINIC | Age: 77
End: 2022-05-17

## 2022-05-17 DIAGNOSIS — M25.561 PAIN IN BOTH KNEES, UNSPECIFIED CHRONICITY: Primary | ICD-10-CM

## 2022-05-17 DIAGNOSIS — M25.562 PAIN IN BOTH KNEES, UNSPECIFIED CHRONICITY: Primary | ICD-10-CM

## 2022-05-17 NOTE — TELEPHONE ENCOUNTER
Please enter an Xray RX for a SVETA KNEES for  this patient's upcoming appointment, as the patient has not had any recent  imaging completed. Patient was instructed to get X-rays 30 minutes before appt. PLEASE REPLY TO THIS MESSAGE when the order is put in EPIC so that I can schedule the Xray appt. Thank you, in advance!     Future Appointments   Date Time Provider Aaron Francisco   5/24/2022  2:20 PM STEFANIE Root Verseverino Doing [id number]

## 2022-05-24 ENCOUNTER — HOSPITAL ENCOUNTER (OUTPATIENT)
Dept: GENERAL RADIOLOGY | Age: 77
Discharge: HOME | End: 2022-05-24
Attending: PHYSICIAN ASSISTANT
Payer: MEDICARE

## 2022-05-24 ENCOUNTER — OFFICE VISIT (OUTPATIENT)
Dept: ORTHOPEDICS CLINIC | Facility: CLINIC | Age: 77
End: 2022-05-24
Payer: MEDICARE

## 2022-05-24 VITALS — BODY MASS INDEX: 40.18 KG/M2 | HEIGHT: 66 IN | WEIGHT: 250 LBS

## 2022-05-24 DIAGNOSIS — M25.561 PAIN IN BOTH KNEES, UNSPECIFIED CHRONICITY: ICD-10-CM

## 2022-05-24 DIAGNOSIS — M17.0 BILATERAL PRIMARY OSTEOARTHRITIS OF KNEE: ICD-10-CM

## 2022-05-24 DIAGNOSIS — E66.01 CLASS 3 SEVERE OBESITY DUE TO EXCESS CALORIES WITH BODY MASS INDEX (BMI) OF 40.0 TO 44.9 IN ADULT, UNSPECIFIED WHETHER SERIOUS COMORBIDITY PRESENT (HCC): ICD-10-CM

## 2022-05-24 DIAGNOSIS — M25.562 PAIN IN BOTH KNEES, UNSPECIFIED CHRONICITY: ICD-10-CM

## 2022-05-24 PROCEDURE — 73564 X-RAY EXAM KNEE 4 OR MORE: CPT | Performed by: PHYSICIAN ASSISTANT

## 2022-05-24 RX ORDER — TRIAMCINOLONE ACETONIDE 40 MG/ML
40 INJECTION, SUSPENSION INTRA-ARTICULAR; INTRAMUSCULAR ONCE
Status: COMPLETED | OUTPATIENT
Start: 2022-05-24 | End: 2022-05-24

## 2022-05-24 RX ADMIN — TRIAMCINOLONE ACETONIDE 40 MG: 40 INJECTION, SUSPENSION INTRA-ARTICULAR; INTRAMUSCULAR at 15:13:00

## 2022-05-24 NOTE — PROCEDURES
Bilateral Knee Intra-articular Injection    Name: Ector Cooley   MRN: [id number]  Date: 5/24/2022     Clinical Indications:   Knee Osteoarthritis with symptoms refractory to conservative measures. After informed consent, the injection site was marked, sterilized with topical chlorhexidine antiseptic, and locally anesthetized with skin refrigerant. The patient was situation in a comfortable position. Using sterile technique: 2 mL of 0.5% Bupivicaine, 2 mL of 1% Lidocaine, and 1 mL of 40 mg/ml Triamcinolone was injected utilizing anterolateral approach with a 21 gauge needle. A band-aid was applied. The patient tolerated the procedure well. Disposition:   Return to clinic on an as needed basis. KRISTA Ordonez, URI Orthopedic Surgery / Sports Medicine Specialist  [facility] Orthopaedic Surgery  [address], [address]   [facility]  [email or web address]  t: [phone]  o: [phone]  f: [phone]          This note was dictated using Dragon software. While it was briefly proofread prior to completion, some grammatical, spelling, and word choice errors due to dictation may still occur.

## 2022-06-10 ENCOUNTER — OFFICE VISIT (OUTPATIENT)
Dept: FAMILY MEDICINE CLINIC | Facility: CLINIC | Age: 77
End: 2022-06-10
Payer: MEDICARE

## 2022-06-10 VITALS
RESPIRATION RATE: 16 BRPM | DIASTOLIC BLOOD PRESSURE: 84 MMHG | TEMPERATURE: 98 F | HEIGHT: 66 IN | BODY MASS INDEX: 40 KG/M2 | SYSTOLIC BLOOD PRESSURE: 124 MMHG | HEART RATE: 82 BPM

## 2022-06-10 DIAGNOSIS — J42 CHRONIC BRONCHITIS, UNSPECIFIED CHRONIC BRONCHITIS TYPE (HCC): ICD-10-CM

## 2022-06-10 DIAGNOSIS — R73.9 HYPERGLYCEMIA: ICD-10-CM

## 2022-06-10 DIAGNOSIS — E78.2 HYPERLIPIDEMIA, MIXED: ICD-10-CM

## 2022-06-10 DIAGNOSIS — M25.562 CHRONIC PAIN OF LEFT KNEE: ICD-10-CM

## 2022-06-10 DIAGNOSIS — F32.A DEPRESSION, UNSPECIFIED DEPRESSION TYPE: ICD-10-CM

## 2022-06-10 DIAGNOSIS — K44.9 HIATAL HERNIA: ICD-10-CM

## 2022-06-10 DIAGNOSIS — D35.01 BILATERAL ADRENAL ADENOMAS: ICD-10-CM

## 2022-06-10 DIAGNOSIS — R06.00 DOE (DYSPNEA ON EXERTION): ICD-10-CM

## 2022-06-10 DIAGNOSIS — E66.01 OBESITY, MORBID (HCC): ICD-10-CM

## 2022-06-10 DIAGNOSIS — D35.02 BILATERAL ADRENAL ADENOMAS: ICD-10-CM

## 2022-06-10 DIAGNOSIS — I10 ESSENTIAL HYPERTENSION: ICD-10-CM

## 2022-06-10 DIAGNOSIS — G89.29 CHRONIC PAIN OF RIGHT KNEE: ICD-10-CM

## 2022-06-10 DIAGNOSIS — E27.8 ADRENAL NODULE (HCC): ICD-10-CM

## 2022-06-10 DIAGNOSIS — Z00.00 ENCOUNTER FOR ANNUAL HEALTH EXAMINATION: Primary | ICD-10-CM

## 2022-06-10 DIAGNOSIS — D50.9 IRON DEFICIENCY ANEMIA, UNSPECIFIED IRON DEFICIENCY ANEMIA TYPE: ICD-10-CM

## 2022-06-10 DIAGNOSIS — M25.561 CHRONIC PAIN OF RIGHT KNEE: ICD-10-CM

## 2022-06-10 DIAGNOSIS — G89.29 CHRONIC PAIN OF LEFT KNEE: ICD-10-CM

## 2022-06-10 DIAGNOSIS — Z85.3 HISTORY OF LEFT BREAST CANCER: ICD-10-CM

## 2022-06-10 DIAGNOSIS — F41.9 ANXIETY: ICD-10-CM

## 2022-06-10 RX ORDER — DULOXETIN HYDROCHLORIDE 20 MG/1
20 CAPSULE, DELAYED RELEASE ORAL DAILY
Qty: 90 CAPSULE | Refills: 0 | Status: SHIPPED | OUTPATIENT
Start: 2022-06-10

## 2022-06-10 RX ORDER — ROSUVASTATIN CALCIUM 5 MG/1
5 TABLET, COATED ORAL EVERY EVENING
Qty: 90 TABLET | Refills: 1 | Status: SHIPPED | OUTPATIENT
Start: 2022-06-10

## 2022-06-10 RX ORDER — BENAZEPRIL HYDROCHLORIDE 20 MG/1
20 TABLET ORAL DAILY
Qty: 90 TABLET | Refills: 1 | Status: SHIPPED | OUTPATIENT
Start: 2022-06-10

## 2022-06-10 RX ORDER — AMLODIPINE BESYLATE 5 MG/1
5 TABLET ORAL DAILY
Qty: 90 TABLET | Refills: 1 | Status: SHIPPED | OUTPATIENT
Start: 2022-06-10

## 2022-06-10 RX ORDER — CELECOXIB 200 MG/1
200 CAPSULE ORAL DAILY
Qty: 90 CAPSULE | Refills: 1 | Status: SHIPPED | OUTPATIENT
Start: 2022-06-10

## 2022-06-10 RX ORDER — DULOXETIN HYDROCHLORIDE 60 MG/1
60 CAPSULE, DELAYED RELEASE ORAL DAILY
Qty: 90 CAPSULE | Refills: 0 | Status: SHIPPED | OUTPATIENT
Start: 2022-06-10

## 2022-06-10 NOTE — TELEPHONE ENCOUNTER
LOV    LAST LAB    LAST RX   DULOXETINE 60 MG Oral Cap DR Particles 90 capsule 0 12/27/2021    Sig: Rodolfo Florencio 1 CAPSULE(60 MG) BY MOUTH DAILY           Next OV   Future Appointments   Date Time Provider Aaron Maryam   6/10/2022  2:30 PM Zohaib Hernandez MD [facility] [id number]         PROTOCOL NONE . Pt is coming in for appointment 6/10/22. Please advise?

## 2022-06-11 RX ORDER — DULOXETIN HYDROCHLORIDE 60 MG/1
CAPSULE, DELAYED RELEASE ORAL
Qty: 90 CAPSULE | Refills: 0 | Status: SHIPPED | OUTPATIENT
Start: 2022-06-11

## 2022-06-22 DIAGNOSIS — F32.A DEPRESSION, UNSPECIFIED DEPRESSION TYPE: ICD-10-CM

## 2022-06-22 RX ORDER — TAMOXIFEN CITRATE 20 MG/1
TABLET ORAL
Qty: 90 TABLET | Refills: 1 | Status: SHIPPED | OUTPATIENT
Start: 2022-06-22

## 2022-06-23 RX ORDER — DULOXETIN HYDROCHLORIDE 60 MG/1
CAPSULE, DELAYED RELEASE ORAL
Qty: 90 CAPSULE | Refills: 0 | OUTPATIENT
Start: 2022-06-23

## 2022-06-28 ENCOUNTER — PATIENT OUTREACH (OUTPATIENT)
Dept: CASE MANAGEMENT | Age: 77
End: 2022-06-28

## 2022-06-28 NOTE — PROGRESS NOTES
Attempted to reach patient for CCM monthly outreach call. Patient states that now is not a good time, she will call back. Total time: 5 Minutes  Total Monthly time: 5 Minutes  Patient's medical record reviewed, including recent OV notes and test results.

## 2022-07-27 ENCOUNTER — PATIENT OUTREACH (OUTPATIENT)
Dept: CASE MANAGEMENT | Age: 77
End: 2022-07-27

## 2022-07-27 DIAGNOSIS — E78.2 HYPERLIPIDEMIA, MIXED: ICD-10-CM

## 2022-07-27 DIAGNOSIS — F41.9 ANXIETY: ICD-10-CM

## 2022-07-27 DIAGNOSIS — E66.01 OBESITY, MORBID (HCC): ICD-10-CM

## 2022-07-27 DIAGNOSIS — C50.912 BREAST CARCINOMA, FEMALE, LEFT (HCC): ICD-10-CM

## 2022-07-27 DIAGNOSIS — I10 HYPERTENSION, UNSPECIFIED TYPE: ICD-10-CM

## 2022-07-27 DIAGNOSIS — F32.A DEPRESSION, UNSPECIFIED DEPRESSION TYPE: ICD-10-CM

## 2022-07-29 ENCOUNTER — LAB ENCOUNTER (OUTPATIENT)
Dept: LAB | Age: 77
End: 2022-07-29
Attending: FAMILY MEDICINE
Payer: MEDICARE

## 2022-07-29 DIAGNOSIS — Z86.39 HISTORY OF IRON DEFICIENCY: ICD-10-CM

## 2022-07-29 LAB
BASOPHILS # BLD AUTO: 0.09 X10(3) UL (ref 0–0.2)
BASOPHILS NFR BLD AUTO: 1 %
DEPRECATED HBV CORE AB SER IA-ACNC: 38 NG/ML
EOSINOPHIL # BLD AUTO: 0.3 X10(3) UL (ref 0–0.7)
EOSINOPHIL NFR BLD AUTO: 3.4 %
ERYTHROCYTE [DISTWIDTH] IN BLOOD BY AUTOMATED COUNT: 14.1 %
HCT VFR BLD AUTO: 45.2 %
HGB BLD-MCNC: 14.1 G/DL
IMM GRANULOCYTES # BLD AUTO: 0.02 X10(3) UL (ref 0–1)
IMM GRANULOCYTES NFR BLD: 0.2 %
IRON SATN MFR SERPL: 19 %
IRON SERPL-MCNC: 81 UG/DL
LYMPHOCYTES # BLD AUTO: 2.51 X10(3) UL (ref 1–4)
LYMPHOCYTES NFR BLD AUTO: 28.6 %
MCH RBC QN AUTO: 30.9 PG (ref 26–34)
MCHC RBC AUTO-ENTMCNC: 31.2 G/DL (ref 31–37)
MCV RBC AUTO: 98.9 FL
MONOCYTES # BLD AUTO: 0.74 X10(3) UL (ref 0.1–1)
MONOCYTES NFR BLD AUTO: 8.4 %
NEUTROPHILS # BLD AUTO: 5.13 X10 (3) UL (ref 1.5–7.7)
NEUTROPHILS # BLD AUTO: 5.13 X10(3) UL (ref 1.5–7.7)
NEUTROPHILS NFR BLD AUTO: 58.4 %
PLATELET # BLD AUTO: 263 10(3)UL (ref 150–450)
RBC # BLD AUTO: 4.57 X10(6)UL
TIBC SERPL-MCNC: 432 UG/DL (ref 240–450)
TRANSFERRIN SERPL-MCNC: 290 MG/DL (ref 200–360)
WBC # BLD AUTO: 8.8 X10(3) UL (ref 4–11)

## 2022-07-29 PROCEDURE — 82728 ASSAY OF FERRITIN: CPT

## 2022-07-29 PROCEDURE — 83550 IRON BINDING TEST: CPT

## 2022-07-29 PROCEDURE — 83540 ASSAY OF IRON: CPT

## 2022-07-29 PROCEDURE — 85025 COMPLETE CBC W/AUTO DIFF WBC: CPT

## 2022-07-31 ENCOUNTER — APPOINTMENT (OUTPATIENT)
Dept: GENERAL RADIOLOGY | Facility: HOSPITAL | Age: 77
End: 2022-07-31
Attending: EMERGENCY MEDICINE
Payer: MEDICARE

## 2022-07-31 ENCOUNTER — HOSPITAL ENCOUNTER (OUTPATIENT)
Facility: HOSPITAL | Age: 77
Setting detail: OBSERVATION
Discharge: HOME | End: 2022-07-31
Attending: EMERGENCY MEDICINE | Admitting: HOSPITALIST
Payer: MEDICARE

## 2022-07-31 ENCOUNTER — APPOINTMENT (OUTPATIENT)
Dept: ULTRASOUND IMAGING | Facility: HOSPITAL | Age: 77
End: 2022-07-31
Attending: EMERGENCY MEDICINE
Payer: MEDICARE

## 2022-07-31 VITALS
HEART RATE: 82 BPM | HEIGHT: 66 IN | OXYGEN SATURATION: 97 % | TEMPERATURE: 98 F | SYSTOLIC BLOOD PRESSURE: 120 MMHG | RESPIRATION RATE: 16 BRPM | BODY MASS INDEX: 44.2 KG/M2 | DIASTOLIC BLOOD PRESSURE: 79 MMHG | WEIGHT: 275 LBS

## 2022-07-31 DIAGNOSIS — K80.50 BILIARY COLIC: ICD-10-CM

## 2022-07-31 DIAGNOSIS — K85.10 ACUTE BILIARY PANCREATITIS, UNSPECIFIED COMPLICATION STATUS: Primary | ICD-10-CM

## 2022-07-31 PROBLEM — R79.89 AZOTEMIA: Status: ACTIVE | Noted: 2022-07-31

## 2022-07-31 PROBLEM — K81.0 ACUTE CHOLECYSTITIS: Status: ACTIVE | Noted: 2022-07-31

## 2022-07-31 PROBLEM — R73.9 HYPERGLYCEMIA: Status: ACTIVE | Noted: 2022-07-31

## 2022-07-31 LAB
ALBUMIN SERPL-MCNC: 3.4 G/DL (ref 3.4–5)
ALBUMIN/GLOB SERPL: 1 {RATIO} (ref 1–2)
ALP LIVER SERPL-CCNC: 53 U/L
ALT SERPL-CCNC: 18 U/L
ANION GAP SERPL CALC-SCNC: 6 MMOL/L (ref 0–18)
AST SERPL-CCNC: 16 U/L (ref 15–37)
ATRIAL RATE: 95 BPM
BASOPHILS # BLD AUTO: 0.09 X10(3) UL (ref 0–0.2)
BASOPHILS NFR BLD AUTO: 0.7 %
BILIRUB SERPL-MCNC: 0.4 MG/DL (ref 0.1–2)
BUN BLD-MCNC: 23 MG/DL (ref 7–18)
CALCIUM BLD-MCNC: 9.5 MG/DL (ref 8.5–10.1)
CHLORIDE SERPL-SCNC: 111 MMOL/L (ref 98–112)
CO2 SERPL-SCNC: 27 MMOL/L (ref 21–32)
CREAT BLD-MCNC: 1.11 MG/DL
EOSINOPHIL # BLD AUTO: 0.18 X10(3) UL (ref 0–0.7)
EOSINOPHIL NFR BLD AUTO: 1.4 %
ERYTHROCYTE [DISTWIDTH] IN BLOOD BY AUTOMATED COUNT: 14.2 %
GLOBULIN PLAS-MCNC: 3.5 G/DL (ref 2.8–4.4)
GLUCOSE BLD-MCNC: 165 MG/DL (ref 70–99)
HCT VFR BLD AUTO: 44.1 %
HGB BLD-MCNC: 14.4 G/DL
IMM GRANULOCYTES # BLD AUTO: 0.08 X10(3) UL (ref 0–1)
IMM GRANULOCYTES NFR BLD: 0.6 %
LIPASE SERPL-CCNC: 326 U/L (ref 73–393)
LIPASE SERPL-CCNC: 864 U/L (ref 73–393)
LYMPHOCYTES # BLD AUTO: 1.41 X10(3) UL (ref 1–4)
LYMPHOCYTES NFR BLD AUTO: 10.8 %
MCH RBC QN AUTO: 31.5 PG (ref 26–34)
MCHC RBC AUTO-ENTMCNC: 32.7 G/DL (ref 31–37)
MCV RBC AUTO: 96.5 FL
MONOCYTES # BLD AUTO: 0.5 X10(3) UL (ref 0.1–1)
MONOCYTES NFR BLD AUTO: 3.8 %
NEUTROPHILS # BLD AUTO: 10.8 X10 (3) UL (ref 1.5–7.7)
NEUTROPHILS # BLD AUTO: 10.8 X10(3) UL (ref 1.5–7.7)
NEUTROPHILS NFR BLD AUTO: 82.7 %
OSMOLALITY SERPL CALC.SUM OF ELEC: 305 MOSM/KG (ref 275–295)
P AXIS: 51 DEGREES
P-R INTERVAL: 168 MS
PLATELET # BLD AUTO: 239 10(3)UL (ref 150–450)
POTASSIUM SERPL-SCNC: 3.9 MMOL/L (ref 3.5–5.1)
PROT SERPL-MCNC: 6.9 G/DL (ref 6.4–8.2)
Q-T INTERVAL: 396 MS
QRS DURATION: 86 MS
QTC CALCULATION (BEZET): 497 MS
R AXIS: 8 DEGREES
RBC # BLD AUTO: 4.57 X10(6)UL
SARS-COV-2 RNA RESP QL NAA+PROBE: NOT DETECTED
SODIUM SERPL-SCNC: 144 MMOL/L (ref 136–145)
T AXIS: 58 DEGREES
TROPONIN I HIGH SENSITIVITY: 5 NG/L
VENTRICULAR RATE: 95 BPM
WBC # BLD AUTO: 13.1 X10(3) UL (ref 4–11)

## 2022-07-31 PROCEDURE — 99490 CHRNC CARE MGMT STAFF 1ST 20: CPT

## 2022-07-31 PROCEDURE — 76705 ECHO EXAM OF ABDOMEN: CPT | Performed by: EMERGENCY MEDICINE

## 2022-07-31 PROCEDURE — 71045 X-RAY EXAM CHEST 1 VIEW: CPT | Performed by: EMERGENCY MEDICINE

## 2022-07-31 PROCEDURE — 99220 INITIAL OBSERVATION CARE,LEVL III: CPT | Performed by: HOSPITALIST

## 2022-07-31 RX ORDER — POLYETHYLENE GLYCOL 3350 17 G/17G
17 POWDER, FOR SOLUTION ORAL DAILY PRN
Status: DISCONTINUED | OUTPATIENT
Start: 2022-07-31 | End: 2022-07-31

## 2022-07-31 RX ORDER — MELATONIN
3 NIGHTLY PRN
Status: DISCONTINUED | OUTPATIENT
Start: 2022-07-31 | End: 2022-07-31

## 2022-07-31 RX ORDER — METOPROLOL SUCCINATE 25 MG/1
25 TABLET, EXTENDED RELEASE ORAL DAILY
Status: DISCONTINUED | OUTPATIENT
Start: 2022-07-31 | End: 2022-07-31

## 2022-07-31 RX ORDER — ROSUVASTATIN CALCIUM 5 MG/1
5 TABLET, COATED ORAL NIGHTLY
Status: DISCONTINUED | OUTPATIENT
Start: 2022-07-31 | End: 2022-07-31

## 2022-07-31 RX ORDER — SODIUM CHLORIDE 9 MG/ML
INJECTION, SOLUTION INTRAVENOUS CONTINUOUS
OUTPATIENT
Start: 2022-07-31 | End: 2022-07-31

## 2022-07-31 RX ORDER — MORPHINE SULFATE 4 MG/ML
4 INJECTION, SOLUTION INTRAMUSCULAR; INTRAVENOUS EVERY 2 HOUR PRN
Status: DISCONTINUED | OUTPATIENT
Start: 2022-07-31 | End: 2022-07-31

## 2022-07-31 RX ORDER — ONDANSETRON 2 MG/ML
4 INJECTION INTRAMUSCULAR; INTRAVENOUS EVERY 6 HOURS PRN
Status: DISCONTINUED | OUTPATIENT
Start: 2022-07-31 | End: 2022-07-31

## 2022-07-31 RX ORDER — DULOXETIN HYDROCHLORIDE 20 MG/1
20 CAPSULE, DELAYED RELEASE ORAL DAILY
Status: DISCONTINUED | OUTPATIENT
Start: 2022-07-31 | End: 2022-07-31

## 2022-07-31 RX ORDER — MORPHINE SULFATE 2 MG/ML
1 INJECTION, SOLUTION INTRAMUSCULAR; INTRAVENOUS EVERY 2 HOUR PRN
Status: DISCONTINUED | OUTPATIENT
Start: 2022-07-31 | End: 2022-07-31

## 2022-07-31 RX ORDER — SODIUM PHOSPHATE, DIBASIC AND SODIUM PHOSPHATE, MONOBASIC 7; 19 G/133ML; G/133ML
1 ENEMA RECTAL ONCE AS NEEDED
Status: DISCONTINUED | OUTPATIENT
Start: 2022-07-31 | End: 2022-07-31

## 2022-07-31 RX ORDER — TAMOXIFEN CITRATE 10 MG/1
20 TABLET ORAL DAILY
Status: DISCONTINUED | OUTPATIENT
Start: 2022-07-31 | End: 2022-07-31

## 2022-07-31 RX ORDER — DULOXETIN HYDROCHLORIDE 60 MG/1
60 CAPSULE, DELAYED RELEASE ORAL DAILY
Status: DISCONTINUED | OUTPATIENT
Start: 2022-07-31 | End: 2022-07-31

## 2022-07-31 RX ORDER — MORPHINE SULFATE 2 MG/ML
2 INJECTION, SOLUTION INTRAMUSCULAR; INTRAVENOUS EVERY 2 HOUR PRN
Status: DISCONTINUED | OUTPATIENT
Start: 2022-07-31 | End: 2022-07-31

## 2022-07-31 RX ORDER — SODIUM CHLORIDE 9 MG/ML
INJECTION, SOLUTION INTRAVENOUS CONTINUOUS
Status: DISCONTINUED | OUTPATIENT
Start: 2022-07-31 | End: 2022-07-31

## 2022-07-31 RX ORDER — ONDANSETRON 2 MG/ML
4 INJECTION INTRAMUSCULAR; INTRAVENOUS EVERY 4 HOURS PRN
OUTPATIENT
Start: 2022-07-31 | End: 2022-07-31

## 2022-07-31 RX ORDER — BISACODYL 10 MG
10 SUPPOSITORY, RECTAL RECTAL
Status: DISCONTINUED | OUTPATIENT
Start: 2022-07-31 | End: 2022-07-31

## 2022-07-31 RX ORDER — HEPARIN SODIUM 5000 [USP'U]/ML
7500 INJECTION, SOLUTION INTRAVENOUS; SUBCUTANEOUS EVERY 8 HOURS SCHEDULED
Status: DISCONTINUED | OUTPATIENT
Start: 2022-07-31 | End: 2022-07-31

## 2022-07-31 RX ORDER — LISINOPRIL 10 MG/1
20 TABLET ORAL DAILY
Status: DISCONTINUED | OUTPATIENT
Start: 2022-07-31 | End: 2022-07-31

## 2022-07-31 RX ORDER — AMLODIPINE BESYLATE 5 MG/1
5 TABLET ORAL DAILY
Status: DISCONTINUED | OUTPATIENT
Start: 2022-07-31 | End: 2022-07-31

## 2022-07-31 RX ORDER — METOCLOPRAMIDE HYDROCHLORIDE 5 MG/ML
5 INJECTION INTRAMUSCULAR; INTRAVENOUS EVERY 8 HOURS PRN
Status: DISCONTINUED | OUTPATIENT
Start: 2022-07-31 | End: 2022-07-31

## 2022-07-31 RX ORDER — SENNOSIDES 8.6 MG
17.2 TABLET ORAL NIGHTLY PRN
Status: DISCONTINUED | OUTPATIENT
Start: 2022-07-31 | End: 2022-07-31

## 2022-07-31 RX ORDER — ACETAMINOPHEN 500 MG
500 TABLET ORAL EVERY 4 HOURS PRN
Status: DISCONTINUED | OUTPATIENT
Start: 2022-07-31 | End: 2022-07-31

## 2022-07-31 RX ORDER — HYDROMORPHONE HYDROCHLORIDE 1 MG/ML
0.5 INJECTION, SOLUTION INTRAMUSCULAR; INTRAVENOUS; SUBCUTANEOUS EVERY 30 MIN PRN
OUTPATIENT
Start: 2022-07-31 | End: 2022-07-31

## 2022-07-31 NOTE — DISCHARGE SUMMARY
[facility] HOSPITALIST  DISCHARGE SUMMARY     Zaid Bob Patient Status:  Observation    1945 MRN [id number]   [facility] 3NE-A Attending Amelia Aguillon MD   Hosp Day # 0 PCP Ambar Menendez MD     Date of Admission: 2022  Date of Discharge: 2022  Discharge Disposition: Home or Self Care    Discharge Diagnosis:   Acute gallstone pancreatitis     History of Present Illness: Zaid Bob is a 64year old female with hx of hypertension, hyperlipidemia, anxiety, depression present to the ED with epigastric pain that started after eating some [employer] chicken for the first time. Pain is sharp with no radiation. No chest pain SOB. No melena, hematochezia. No fevers, chills, nausea or vomiting. Brief Synopsis: Patient presented to the ER with abdominal pain. She was found to have acute gallstone pancreatitis for which she was admitted with surgery on consult. Patient with resolution of pain. Diet initiated and tolerated. Home today with outpatient follow-up.        59-90 High Risk  29-58 Medium Risk  0-28   Low Risk         TCM Follow-Up Recommendation:  LACE < 29: Low Risk of readmission after discharge from the hospital; Still recommend for TCM follow-up. Discharge Medication List:     Discharge Medications      CONTINUE taking these medications      Instructions Prescription details   amLODIPine 5 MG Tabs  Commonly known as: Norvasc      Take 1 tablet (5 mg total) by mouth daily. Quantity: 90 tablet  Refills: 1     Benazepril HCl 20 MG Tabs  Commonly known as: LOTENSIN      Take 1 tablet (20 mg total) by mouth daily. Quantity: 90 tablet  Refills: 1     celecoxib 200 MG Caps  Commonly known as: CeleBREX      Take 1 capsule (200 mg total) by mouth daily. Quantity: 90 capsule  Refills: 1     DULoxetine 20 MG Cpep  Commonly known as: Cymbalta      Take 1 capsule (20 mg total) by mouth daily.    Quantity: 90 capsule  Refills: 0     DULoxetine 60 MG Cpep  Commonly known as: Cymbalta      TAKE 1 CAPSULE(60 MG) BY MOUTH DAILY   Quantity: 90 capsule  Refills: 0     FISH OIL OR      Take by mouth daily. Refills: 0     metoprolol succinate ER 25 MG Tb24  Commonly known as: Toprol XL      Take 25 mg by mouth daily. Refills: 0     rosuvastatin 5 MG Tabs  Commonly known as: Crestor      Take 1 tablet (5 mg total) by mouth every evening. Quantity: 90 tablet  Refills: 1     tamoxifen 20 MG Tabs  Commonly known as: Nolvadex      TAKE 1 TABLET BY MOUTH EVERY DAY   Quantity: 90 tablet  Refills: 1     Vitamin D 1000 units Tabs      Take by mouth.  Daily except when she takes weekly 50,000 dose   Refills: 0        STOP taking these medications    ergocalciferol 1.25 MG (61622 UT) Caps  Commonly known as: Vitamin D2        ondansetron 4 MG Tbdp  Commonly known as: Zofran-ODT               [place]    Follow-up appointment:   Luba Dillard MD  [address] [place] [address]  [phone]    In 1 week  Evaluation of hiatal hernia and gallbladder    Appointments for Next 30 Days 7/31/2022 - 8/30/2022            None        -----------------------------------------------------------------------------------------------  PATIENT DISCHARGE INSTRUCTIONS: See electronic chart    Kristel Andersen MD    Time spent:  > 30 minutes

## 2022-07-31 NOTE — PROGRESS NOTES
07/31/22 3548   Provider Notification   Reason for Communication New consult   Provider Name Jamia Buchanan MD   Method of Communication Page   Response Waiting for response   Notification Time 8768

## 2022-07-31 NOTE — ED INITIAL ASSESSMENT (HPI)
Pt to ED w/ complaints of upper abdominal pain and left shoulder pain. Pt states it started tonight at approximately 9pm. States she thinks she is having a gallbladder attack, she had this similar pain in November of last year. Pt states she ate fried chicken tonight and thinks that is what caused it. Pt had multiple episodes of emesis prior to ER arrival, emesis x1 in triage. Pts daughter is concerned pt is having a heart attack.  EKG obtained upon arrival.

## 2022-07-31 NOTE — ED QUICK NOTES
Orders for admission, patient is aware of plan and ready to go upstairs. Any questions, please call ED RN Kevin Jaquez  at extension [phone]. Vaccinated?  Yes  Type of COVID test sent: Rapid PCR  COVID Suspicion level: Low      Titratable drug(s) infusing:N/A  Rate:    LOC at time of transport: [facility]    Other pertinent information: Currently NPO    CIWA score=N/A  NIH score=N/A

## 2022-08-01 ENCOUNTER — TELEPHONE (OUTPATIENT)
Dept: FAMILY MEDICINE CLINIC | Facility: CLINIC | Age: 77
End: 2022-08-01

## 2022-08-01 DIAGNOSIS — K44.9 HIATAL HERNIA: Primary | ICD-10-CM

## 2022-08-01 DIAGNOSIS — N28.9 RENAL INSUFFICIENCY: ICD-10-CM

## 2022-08-01 DIAGNOSIS — K85.10 ACUTE BILIARY PANCREATITIS, UNSPECIFIED COMPLICATION STATUS: ICD-10-CM

## 2022-08-01 DIAGNOSIS — D72.829 LEUKOCYTOSIS, UNSPECIFIED TYPE: ICD-10-CM

## 2022-08-01 NOTE — TELEPHONE ENCOUNTER
Pt requesting referral for Dr. Ishan Talley, General Surgeon. [facility] [place] [address],   [place], [address]    Pt was referred to Dr. Juan J Devries by Dr. Maylene Kocher for repair of hiatal hernia.

## 2022-08-02 NOTE — TELEPHONE ENCOUNTER
Referral was placed   patient should also check the CBC and CMP and lipase level she had biliary pancreatitis. We to make sure that her white count also is normal.  Diagnosis leukocytosis, renal insufficiency. Thanks.

## 2022-08-02 NOTE — TELEPHONE ENCOUNTER
Vanessa Guzman lab orders placed. Dr Eufemia Cates [phone]  Call to pt's home reaches voice mail. Per hipaa consent, left vmm req call back to triage nurse today for dr instructions (see dr's note below).  P  provided ofc phone hours/contact number

## 2022-08-03 NOTE — TELEPHONE ENCOUNTER
Call to pt-advised of hilaria fuller's comments and recommendations below. Pt confirms has contact # for dr Bruno Kelley and already has appt scheduled for 8/10/22. Patient voices understanding/agrees with plan/no further questions. sts will try to do labs as this wk as soon as possible.

## 2022-08-08 ENCOUNTER — LAB ENCOUNTER (OUTPATIENT)
Dept: LAB | Age: 77
End: 2022-08-08
Attending: FAMILY MEDICINE
Payer: MEDICARE

## 2022-08-08 DIAGNOSIS — K85.10 ACUTE BILIARY PANCREATITIS, UNSPECIFIED COMPLICATION STATUS: ICD-10-CM

## 2022-08-08 DIAGNOSIS — D72.829 LEUKOCYTOSIS, UNSPECIFIED TYPE: ICD-10-CM

## 2022-08-08 DIAGNOSIS — N28.9 RENAL INSUFFICIENCY: ICD-10-CM

## 2022-08-08 LAB
ALBUMIN SERPL-MCNC: 3.5 G/DL (ref 3.4–5)
ALBUMIN/GLOB SERPL: 1.1 {RATIO} (ref 1–2)
ALP LIVER SERPL-CCNC: 51 U/L
ALT SERPL-CCNC: 19 U/L
ANION GAP SERPL CALC-SCNC: 9 MMOL/L (ref 0–18)
AST SERPL-CCNC: 13 U/L (ref 15–37)
BASOPHILS # BLD AUTO: 0.1 X10(3) UL (ref 0–0.2)
BASOPHILS NFR BLD AUTO: 1.2 %
BILIRUB SERPL-MCNC: 0.5 MG/DL (ref 0.1–2)
BUN BLD-MCNC: 26 MG/DL (ref 7–18)
BUN/CREAT SERPL: 25.2 (ref 10–20)
CALCIUM BLD-MCNC: 9.9 MG/DL (ref 8.5–10.1)
CHLORIDE SERPL-SCNC: 112 MMOL/L (ref 98–112)
CO2 SERPL-SCNC: 22 MMOL/L (ref 21–32)
CREAT BLD-MCNC: 1.03 MG/DL
DEPRECATED RDW RBC AUTO: 48.6 FL (ref 35.1–46.3)
EOSINOPHIL # BLD AUTO: 0.3 X10(3) UL (ref 0–0.7)
EOSINOPHIL NFR BLD AUTO: 3.6 %
ERYTHROCYTE [DISTWIDTH] IN BLOOD BY AUTOMATED COUNT: 13.4 % (ref 11–15)
FASTING STATUS PATIENT QL REPORTED: YES
GFR SERPLBLD BASED ON 1.73 SQ M-ARVRAT: 56 ML/MIN/1.73M2 (ref 60–?)
GLOBULIN PLAS-MCNC: 3.1 G/DL (ref 2.8–4.4)
GLUCOSE BLD-MCNC: 99 MG/DL (ref 70–99)
HCT VFR BLD AUTO: 43.6 %
HGB BLD-MCNC: 14 G/DL
IMM GRANULOCYTES # BLD AUTO: 0.02 X10(3) UL (ref 0–1)
IMM GRANULOCYTES NFR BLD: 0.2 %
LIPASE SERPL-CCNC: 162 U/L (ref 73–393)
LYMPHOCYTES # BLD AUTO: 2.59 X10(3) UL (ref 1–4)
LYMPHOCYTES NFR BLD AUTO: 31.1 %
MCH RBC QN AUTO: 31.1 PG (ref 26–34)
MCHC RBC AUTO-ENTMCNC: 32.1 G/DL (ref 31–37)
MCV RBC AUTO: 96.9 FL
MONOCYTES # BLD AUTO: 0.66 X10(3) UL (ref 0.1–1)
MONOCYTES NFR BLD AUTO: 7.9 %
NEUTROPHILS # BLD AUTO: 4.67 X10 (3) UL (ref 1.5–7.7)
NEUTROPHILS # BLD AUTO: 4.67 X10(3) UL (ref 1.5–7.7)
NEUTROPHILS NFR BLD AUTO: 56 %
OSMOLALITY SERPL CALC.SUM OF ELEC: 301 MOSM/KG (ref 275–295)
PLATELET # BLD AUTO: 258 10(3)UL (ref 150–450)
POTASSIUM SERPL-SCNC: 4.5 MMOL/L (ref 3.5–5.1)
PROT SERPL-MCNC: 6.6 G/DL (ref 6.4–8.2)
RBC # BLD AUTO: 4.5 X10(6)UL
SODIUM SERPL-SCNC: 143 MMOL/L (ref 136–145)
WBC # BLD AUTO: 8.3 X10(3) UL (ref 4–11)

## 2022-08-08 PROCEDURE — 85025 COMPLETE CBC W/AUTO DIFF WBC: CPT

## 2022-08-08 PROCEDURE — 80053 COMPREHEN METABOLIC PANEL: CPT

## 2022-08-08 PROCEDURE — 83690 ASSAY OF LIPASE: CPT

## 2022-08-22 DIAGNOSIS — G89.29 CHRONIC PAIN OF LEFT KNEE: ICD-10-CM

## 2022-08-22 DIAGNOSIS — G89.29 CHRONIC PAIN OF RIGHT KNEE: ICD-10-CM

## 2022-08-22 DIAGNOSIS — M25.562 CHRONIC PAIN OF LEFT KNEE: ICD-10-CM

## 2022-08-22 DIAGNOSIS — M25.561 CHRONIC PAIN OF RIGHT KNEE: ICD-10-CM

## 2022-08-22 RX ORDER — CELECOXIB 200 MG/1
CAPSULE ORAL
Qty: 90 CAPSULE | Refills: 1 | OUTPATIENT
Start: 2022-08-22

## 2022-09-02 NOTE — PAT NURSING NOTE
Surgical case request for 9/6/2022 missing surgeon signature. Called Dr. Mario Schwab office and left message for [employer].   Await updated surgical case request.

## 2022-09-03 ENCOUNTER — LAB ENCOUNTER (OUTPATIENT)
Dept: LAB | Facility: HOSPITAL | Age: 77
End: 2022-09-03
Payer: MEDICARE

## 2022-09-03 DIAGNOSIS — Z01.818 PRE-OP TESTING: ICD-10-CM

## 2022-09-04 LAB — SARS-COV-2 RNA RESP QL NAA+PROBE: NOT DETECTED

## 2022-09-06 ENCOUNTER — ANESTHESIA EVENT (OUTPATIENT)
Dept: SURGERY | Facility: HOSPITAL | Age: 77
End: 2022-09-06
Payer: MEDICARE

## 2022-09-06 ENCOUNTER — HOSPITAL ENCOUNTER (OUTPATIENT)
Facility: HOSPITAL | Age: 77
Setting detail: HOSPITAL OUTPATIENT SURGERY
Discharge: HOME | End: 2022-09-06
Attending: SURGERY | Admitting: SURGERY
Payer: MEDICARE

## 2022-09-06 ENCOUNTER — ANESTHESIA (OUTPATIENT)
Dept: SURGERY | Facility: HOSPITAL | Age: 77
End: 2022-09-06
Payer: MEDICARE

## 2022-09-06 VITALS
WEIGHT: 245.38 LBS | TEMPERATURE: 97 F | HEIGHT: 66 IN | SYSTOLIC BLOOD PRESSURE: 125 MMHG | RESPIRATION RATE: 18 BRPM | BODY MASS INDEX: 39.43 KG/M2 | DIASTOLIC BLOOD PRESSURE: 60 MMHG | OXYGEN SATURATION: 96 % | HEART RATE: 77 BPM

## 2022-09-06 DIAGNOSIS — Z01.818 PRE-OP TESTING: Primary | ICD-10-CM

## 2022-09-06 PROCEDURE — 88304 TISSUE EXAM BY PATHOLOGIST: CPT | Performed by: SURGERY

## 2022-09-06 PROCEDURE — 0FT44ZZ RESECTION OF GALLBLADDER, PERCUTANEOUS ENDOSCOPIC APPROACH: ICD-10-PCS | Performed by: SURGERY

## 2022-09-06 RX ORDER — ACETAMINOPHEN 500 MG
1000 TABLET ORAL ONCE
Status: DISCONTINUED | OUTPATIENT
Start: 2022-09-06 | End: 2022-09-06 | Stop reason: HOSPADM

## 2022-09-06 RX ORDER — ONDANSETRON 2 MG/ML
INJECTION INTRAMUSCULAR; INTRAVENOUS AS NEEDED
Status: DISCONTINUED | OUTPATIENT
Start: 2022-09-06 | End: 2022-09-06 | Stop reason: SURG

## 2022-09-06 RX ORDER — HYDROMORPHONE HYDROCHLORIDE 1 MG/ML
0.6 INJECTION, SOLUTION INTRAMUSCULAR; INTRAVENOUS; SUBCUTANEOUS EVERY 5 MIN PRN
Status: DISCONTINUED | OUTPATIENT
Start: 2022-09-06 | End: 2022-09-06

## 2022-09-06 RX ORDER — SODIUM CHLORIDE, SODIUM LACTATE, POTASSIUM CHLORIDE, CALCIUM CHLORIDE 600; 310; 30; 20 MG/100ML; MG/100ML; MG/100ML; MG/100ML
INJECTION, SOLUTION INTRAVENOUS CONTINUOUS
Status: DISCONTINUED | OUTPATIENT
Start: 2022-09-06 | End: 2022-09-06

## 2022-09-06 RX ORDER — HYDROMORPHONE HYDROCHLORIDE 1 MG/ML
0.2 INJECTION, SOLUTION INTRAMUSCULAR; INTRAVENOUS; SUBCUTANEOUS EVERY 5 MIN PRN
Status: DISCONTINUED | OUTPATIENT
Start: 2022-09-06 | End: 2022-09-06

## 2022-09-06 RX ORDER — LABETALOL HYDROCHLORIDE 5 MG/ML
INJECTION, SOLUTION INTRAVENOUS AS NEEDED
Status: DISCONTINUED | OUTPATIENT
Start: 2022-09-06 | End: 2022-09-06 | Stop reason: SURG

## 2022-09-06 RX ORDER — PHENYLEPHRINE HCL 10 MG/ML
VIAL (ML) INJECTION AS NEEDED
Status: DISCONTINUED | OUTPATIENT
Start: 2022-09-06 | End: 2022-09-06 | Stop reason: SURG

## 2022-09-06 RX ORDER — BUPIVACAINE HYDROCHLORIDE 5 MG/ML
INJECTION, SOLUTION EPIDURAL; INTRACAUDAL AS NEEDED
Status: DISCONTINUED | OUTPATIENT
Start: 2022-09-06 | End: 2022-09-06

## 2022-09-06 RX ORDER — DEXAMETHASONE SODIUM PHOSPHATE 4 MG/ML
VIAL (ML) INJECTION AS NEEDED
Status: DISCONTINUED | OUTPATIENT
Start: 2022-09-06 | End: 2022-09-06 | Stop reason: SURG

## 2022-09-06 RX ORDER — GLYCOPYRROLATE 0.2 MG/ML
INJECTION, SOLUTION INTRAMUSCULAR; INTRAVENOUS AS NEEDED
Status: DISCONTINUED | OUTPATIENT
Start: 2022-09-06 | End: 2022-09-06 | Stop reason: SURG

## 2022-09-06 RX ORDER — METOCLOPRAMIDE HYDROCHLORIDE 5 MG/ML
INJECTION INTRAMUSCULAR; INTRAVENOUS AS NEEDED
Status: DISCONTINUED | OUTPATIENT
Start: 2022-09-06 | End: 2022-09-06 | Stop reason: SURG

## 2022-09-06 RX ORDER — HYDROMORPHONE HYDROCHLORIDE 1 MG/ML
0.4 INJECTION, SOLUTION INTRAMUSCULAR; INTRAVENOUS; SUBCUTANEOUS EVERY 5 MIN PRN
Status: DISCONTINUED | OUTPATIENT
Start: 2022-09-06 | End: 2022-09-06

## 2022-09-06 RX ORDER — TRAMADOL HYDROCHLORIDE 50 MG/1
50 TABLET ORAL EVERY 4 HOURS PRN
Qty: 20 TABLET | Refills: 0 | Status: SHIPPED | OUTPATIENT
Start: 2022-09-06

## 2022-09-06 RX ORDER — MIDAZOLAM HYDROCHLORIDE 1 MG/ML
INJECTION INTRAMUSCULAR; INTRAVENOUS AS NEEDED
Status: DISCONTINUED | OUTPATIENT
Start: 2022-09-06 | End: 2022-09-06 | Stop reason: SURG

## 2022-09-06 RX ORDER — HYDROMORPHONE HYDROCHLORIDE 1 MG/ML
INJECTION, SOLUTION INTRAMUSCULAR; INTRAVENOUS; SUBCUTANEOUS
Status: COMPLETED
Start: 2022-09-06 | End: 2022-09-06

## 2022-09-06 RX ORDER — NALOXONE HYDROCHLORIDE 0.4 MG/ML
80 INJECTION, SOLUTION INTRAMUSCULAR; INTRAVENOUS; SUBCUTANEOUS AS NEEDED
Status: DISCONTINUED | OUTPATIENT
Start: 2022-09-06 | End: 2022-09-06

## 2022-09-06 RX ORDER — EPHEDRINE SULFATE 50 MG/ML
INJECTION INTRAVENOUS AS NEEDED
Status: DISCONTINUED | OUTPATIENT
Start: 2022-09-06 | End: 2022-09-06 | Stop reason: SURG

## 2022-09-06 RX ORDER — METOPROLOL TARTRATE 5 MG/5ML
2.5 INJECTION INTRAVENOUS ONCE
Status: DISCONTINUED | OUTPATIENT
Start: 2022-09-06 | End: 2022-09-06

## 2022-09-06 RX ORDER — LIDOCAINE HYDROCHLORIDE AND EPINEPHRINE 10; 10 MG/ML; UG/ML
INJECTION, SOLUTION INFILTRATION; PERINEURAL AS NEEDED
Status: DISCONTINUED | OUTPATIENT
Start: 2022-09-06 | End: 2022-09-06

## 2022-09-06 RX ORDER — ROCURONIUM BROMIDE 10 MG/ML
INJECTION, SOLUTION INTRAVENOUS AS NEEDED
Status: DISCONTINUED | OUTPATIENT
Start: 2022-09-06 | End: 2022-09-06 | Stop reason: SURG

## 2022-09-06 RX ADMIN — EPHEDRINE SULFATE 5 MG: 50 INJECTION INTRAVENOUS at 13:38:00

## 2022-09-06 RX ADMIN — ONDANSETRON 4 MG: 2 INJECTION INTRAMUSCULAR; INTRAVENOUS at 13:07:00

## 2022-09-06 RX ADMIN — METOCLOPRAMIDE HYDROCHLORIDE 10 MG: 5 INJECTION INTRAMUSCULAR; INTRAVENOUS at 13:07:00

## 2022-09-06 RX ADMIN — ROCURONIUM BROMIDE 20 MG: 10 INJECTION, SOLUTION INTRAVENOUS at 13:22:00

## 2022-09-06 RX ADMIN — SODIUM CHLORIDE, SODIUM LACTATE, POTASSIUM CHLORIDE, CALCIUM CHLORIDE: 600; 310; 30; 20 INJECTION, SOLUTION INTRAVENOUS at 12:56:00

## 2022-09-06 RX ADMIN — EPHEDRINE SULFATE 5 MG: 50 INJECTION INTRAVENOUS at 13:12:00

## 2022-09-06 RX ADMIN — PHENYLEPHRINE HCL 100 MCG: 10 MG/ML VIAL (ML) INJECTION at 13:43:00

## 2022-09-06 RX ADMIN — GLYCOPYRROLATE 0.2 MG: 0.2 INJECTION, SOLUTION INTRAMUSCULAR; INTRAVENOUS at 13:07:00

## 2022-09-06 RX ADMIN — LABETALOL HYDROCHLORIDE 10 MG: 5 INJECTION, SOLUTION INTRAVENOUS at 13:28:00

## 2022-09-06 RX ADMIN — ROCURONIUM BROMIDE 30 MG: 10 INJECTION, SOLUTION INTRAVENOUS at 13:04:00

## 2022-09-06 RX ADMIN — EPHEDRINE SULFATE 5 MG: 50 INJECTION INTRAVENOUS at 13:40:00

## 2022-09-06 RX ADMIN — DEXAMETHASONE SODIUM PHOSPHATE 4 MG: 4 MG/ML VIAL (ML) INJECTION at 13:07:00

## 2022-09-06 RX ADMIN — MIDAZOLAM HYDROCHLORIDE 2 MG: 1 INJECTION INTRAMUSCULAR; INTRAVENOUS at 13:00:00

## 2022-09-06 NOTE — H&P
[facility]  Outside Information        Office Visit  8/17/2022  [address], [place]  Encounter Summary        Live Waters \"Ward\" - 76 y.o. Female; born [birth year]Encounter Summary, generated on Sep. 06, 2022September 06, 2022   H&P Notes  - documented in this encounter    Mayur Hammond MD - 08/17/2022 10:40 AM CDT  Formatting of this note is different from the original.  Live Waters is a 64year old female  Patient presents with:  Consult: Self ref for gallbladder and abdominal hernia    Here with daughter  Recent er evaluation for gallstone pancreatits  Similar episode last year  Currently pain free  Occurs after fatty foods  Also has hiatal hernia    Past Medical History:   Diagnosis Date   Anxiety 9/9/2016   Breast CA ([address] 5/2014   Cancer ([address]   Depression   Hyperlipidemia   Personal history of antineoplastic chemotherapy   Shortness of breath   Unspecified essential hypertension     Past Surgical History:   Procedure Laterality Date   BACK SURGERY 1970   slipped disc   CAPSULE ENDOSCOPY - INTERNAL REFERRAL 1/24/18= Normal   CHEMOTHERAPY 2014   COLON CA SCRN NOT  W [address] [place] [address] IND N/A 12/11/2014   Procedure: ESOPHAGOGASTRODUODENOSCOPY, COLONOSCOPY, POSSIBLE BIOPSY, POSSIBLE POLYPECTOMY 01982,59706; Surgeon: Michelle Martinez MD; Location: [address] [place] [address]   COLONOSCOPY 1/9/18= Hemorrhoids   Repeat PRN   COLONOSCOPY N/A 1/9/2018   Procedure: COLONOSCOPY; Surgeon: Jason Blount MD; Location: [address] [place] [address] ENDOSCOPY   LUMPECTOMY LEFT 5/2014   IDC -done at [facility] 1 SITE RIGHT (CPT=19081) 3/2015   benign   PATIENT DOCUMENTED NOT TO HAVE EXPERIENCED ANY OF THE FOLLOWING EVENTS N/A 12/11/2014   Procedure: ESOPHAGOGASTRODUODENOSCOPY, COLONOSCOPY, POSSIBLE BIOPSY, POSSIBLE POLYPECTOMY 42393,18090; Surgeon:  Michelle Martinez MD; Location: [address] [place] [address]   PATIENT [address] [place] [address] FOR IV ANTIBIOTIC SURGICAL SITE INFECTION PROPHYLAXIS. N/A 2014   Procedure: ESOPHAGOGASTRODUODENOSCOPY, COLONOSCOPY, POSSIBLE BIOPSY, POSSIBLE POLYPECTOMY 89039,90939; Surgeon: Gabriele Baxter MD; Location: [address] [place] [address]    UPPER GI ENDOSCOPY,BIOPSY N/A 2014   Procedure: ESOPHAGOGASTRODUODENOSCOPY, COLONOSCOPY, POSSIBLE BIOPSY, POSSIBLE POLYPECTOMY 17987,91431; Surgeon: Gabriele Baxter MD; Location: [address] [place] [address]   UPPER GI ENDOSCOPY,BIOPSY 18= Hiatal hernia.  SB Bx normal     Family History   Problem Relation Age of Onset   Heart Attack Father   Other (pneumonia) Mother   Other (unknown cause 28) Brother   Breast Cancer Sister 70   Other (COPD) Sister   Breast Cancer Paternal Aunt   [de-identified]   Breast Cancer Paternal Cousin Female   early 29's   Breast Cancer Self 77     Social History  Tobacco Use  Smoking status: Former  Packs/day: 1.00  Years: 20.00  Pack years: 21  Types: Cigarettes  Quit date: 10/10/1993  Years since quittin.8  Smokeless tobacco: Never  Alcohol use: No  Drug use: No    ROS:  GENERAL HEALTH: otherwise feels well, no weight loss, no fever or chills  SKIN: denies any unusual skin rashes or jaundice  HEENT: denies nasal congestion, sinus pain or sore throat; hearing loss negative  RESPIRATORY: denies shortness of breath, wheezing or cough   CARDIOVASCULAR: denies chest pain or FERRELL; no palpitations   GI: denies nausea, vomiting, constipation, diarrhea; no rectal bleeding; no heartburn  GENITAL/: no dysuria, urgency or frequency, no tea colored urine  MUSCULOSKELETAL: no joint complaints upper or lower extremities  HEMATOLOGY: denies hx anemia; denies bruising or excessive bleeding    EXAM:  GENERAL: well developed, well nourished female, in no apparent distress  SKIN: anicteric  EYES: PERRLA, EOMI, sclera anicteric  HEENT: normocephalic; normal nose  NECK: supple, no JVD  RESPIRATORY: clear to percussion and auscultation  CARDIOVASCULAR: RRR, murmur negative  ABDOMEN: normal active BS, soft, nondistended; no HSM, no masses, deep RUQ/epigastric tenderness  LYMPHATIC: no lymphadenopathy  EXTREMITIES: no cyanosis, clubbing or edema    U/S:large gallstone    Imp: Symptomatic cholelithiasis    Rec: Laparoscopic Cholecystectomy   Weight loss  Observe hiatal hernia at this time    The risks, benefits, and alternatives were discussed with the patient at length. We discussed bleeding, infection, bile leak, anesthesia, recovery and return to work/activity, etc. We discussed the side effects of life without a gallbladder, including possible chronic postoperative diarrhea. The patient was given the booklet about gallbladder surgery.

## 2022-09-06 NOTE — OPERATIVE REPORT
OPERATIVE REPORT   PREOPERATIVE DIAGNOSIS:  CHOLECYSTITIS     POSTOPERATIVE DIAGNOSIS: CHOLECYSTITIS    PROCEDURE PERFORMED: Laparoscopic cholecystectomy. ASSISTANT:  Hakan Noel    INTRAOPERATIVE FINDINGS: Cholelithiasis, cholecystitis. DESCRIPTION OF PROCEDURE: Patient was brought into the operating room and placed on the operating table in the supine position. Inhalational anesthesia was provided by the attending anesthesiologist. The abdomen was prepped and draped in the usual sterile fashion. Lidocaine 1% and 0.5% Marcaine were used as a local anesthetic. I made a skin incision under the umbilicus. The abdominal wall fascia was grasped between 2 Kochers and opened under direct vision. A blunt-tip Kapil trocar was introduced in the abdomen. A pneumoperitoneum was established. The laparoscope was inserted. Three trocars were introduced in the right upper quadrant, all under direct laparoscopic guidance. My assistant grasped the gallbladder, retracted it over the edge of the liver, while I dissected adhesions off the gallbladder wall. The cystic artery was identified. It was dissected free, ligated with hemoclips, and divided. The cyst duct was identified. It was ligated with hemoclips and divided. The gallbladder was released from the undersurface of the liver using electrocautery. The gallbladder was placed into a plastic pouch and retrieved through the umbilical port. The right upper quadrant was irrigated with sterile saline. There was no evidence of any ongoing bleeding or any biliary drainage. At this time, the procedure was complete. The pneumoperitoneum was released. The trocars were removed. The wounds were closed in layers using absorbable sutures. Steri-Strips and a sterile dressing were provided. Patient tolerated the procedure well. Patient was taken to the recovery room for observation.    Dictated By Mamadou Godinez M.D.

## 2022-09-06 NOTE — ANESTHESIA PROCEDURE NOTES
Airway  Date/Time: 9/6/2022 1:05 PM  Urgency: elective      General Information and Staff    Patient location during procedure: OR  Anesthesiologist: Meli Fletcher MD  Performed: anesthesiologist     Indications and Patient Condition  Indications for airway management: anesthesia  Sedation level: deep  Preoxygenated: yes  Patient position: sniffing  Mask difficulty assessment: 2 - vent by mask + OA or adjuvant +/- NMBA    Final Airway Details  Final airway type: endotracheal airway      Successful airway: ETT  Cuffed: yes   Successful intubation technique: Video laryngoscopy  Facilitating devices/methods: intubating stylet  Endotracheal tube insertion site: oral  Blade: GlideScope  Blade size: #3  ETT size (mm): 7.5    Cormack-Lehane Classification: grade IIA - partial view of glottis  Placement verified by: chest auscultation and capnometry   Measured from: lips  ETT to lips (cm): 21  Number of attempts at approach: 1  Ventilation between attempts: none  Number of other approaches attempted: 0    Additional Comments  PreO2. Easy mask. DL x1 with Glidescope #3, grade 2 view, atraumatic oral intubation ETT 7.5, +ETCO2, +BBS. Taped at 21 cm.

## 2022-09-06 NOTE — ANESTHESIA POSTPROCEDURE EVALUATION
[address] Patient Status:  Hospital Outpatient Surgery   Age/Gender 76year old female MRN [id number]   [facility] SURGERY Attending Brian Grgeory MD   Hosp Day # 0 PCP Elías Walsh MD       Anesthesia Post-op Note    LAPAROSCOPIC CHOLECYSTECTOMY  POSSIBLE OPEN    Procedure Summary     Date: 09/06/22 Room / Location: [address] [place] [address] / [address] [place] [address] MAIN OR    Anesthesia Start: 5695 Anesthesia Stop: 1922    Procedure: LAPAROSCOPIC CHOLECYSTECTOMY POSSIBLE OPEN (N/A Abdomen) Diagnosis: (CHOLECYSTITIS)    Surgeons: Brian Gregory MD Anesthesiologist: Myrtis Sacks., MD    Anesthesia Type: general ASA Status: 3          Anesthesia Type: general    Vitals Value Taken Time   /66 09/06/22 1407   Temp 97.0 09/06/22 1407   Pulse 83 09/06/22 1407   Resp 18 09/06/22 1407   SpO2 94 09/06/22 1407       Patient Location: PACU    Anesthesia Type: general    Airway Patency: patent and extubated    Postop Pain Control: adequate    Mental Status: mildly sedated but able to meaningfully participate in the post-anesthesia evaluation    Nausea/Vomiting: none    Cardiopulmonary/Hydration status: stable euvolemic    Complications: no apparent anesthesia related complications    Postop vital signs: stable    Dental Exam: Unchanged from Preop    Patient to be discharged from PACU when criteria met.

## 2022-09-07 DIAGNOSIS — F32.A DEPRESSION, UNSPECIFIED DEPRESSION TYPE: ICD-10-CM

## 2022-09-07 RX ORDER — DULOXETIN HYDROCHLORIDE 20 MG/1
CAPSULE, DELAYED RELEASE ORAL
Qty: 90 CAPSULE | Refills: 0 | Status: SHIPPED | OUTPATIENT
Start: 2022-09-07

## 2022-09-07 RX ORDER — DULOXETIN HYDROCHLORIDE 60 MG/1
CAPSULE, DELAYED RELEASE ORAL
Qty: 90 CAPSULE | Refills: 0 | Status: SHIPPED | OUTPATIENT
Start: 2022-09-07

## 2022-09-07 NOTE — TELEPHONE ENCOUNTER
DULOXETINE DR 60MG CAPSULES    DULOXETINE DR 20MG CAPSULES    Please see pended medications. Please sign if appropriate.       Thank you      Last OV: 06/10/2022    Last refill: 06/10/2022 for 90 caps

## 2022-09-09 ENCOUNTER — PATIENT OUTREACH (OUTPATIENT)
Dept: CASE MANAGEMENT | Age: 77
End: 2022-09-09

## 2022-09-09 DIAGNOSIS — E66.01 OBESITY, MORBID (HCC): ICD-10-CM

## 2022-09-09 DIAGNOSIS — R73.9 HYPERGLYCEMIA: ICD-10-CM

## 2022-09-09 DIAGNOSIS — E78.2 HYPERLIPIDEMIA, MIXED: ICD-10-CM

## 2022-09-09 DIAGNOSIS — F32.A DEPRESSION, UNSPECIFIED DEPRESSION TYPE: ICD-10-CM

## 2022-09-09 DIAGNOSIS — F41.9 ANXIETY: ICD-10-CM

## 2022-09-09 DIAGNOSIS — I10 HYPERTENSION, UNSPECIFIED TYPE: ICD-10-CM

## 2022-09-20 DIAGNOSIS — F32.A DEPRESSION, UNSPECIFIED DEPRESSION TYPE: ICD-10-CM

## 2022-09-20 RX ORDER — DULOXETIN HYDROCHLORIDE 60 MG/1
CAPSULE, DELAYED RELEASE ORAL
Qty: 90 CAPSULE | Refills: 0 | OUTPATIENT
Start: 2022-09-20

## 2022-10-03 ENCOUNTER — OFFICE VISIT (OUTPATIENT)
Dept: ORTHOPEDICS CLINIC | Facility: CLINIC | Age: 77
End: 2022-10-03
Payer: MEDICARE

## 2022-10-03 DIAGNOSIS — M17.0 BILATERAL PRIMARY OSTEOARTHRITIS OF KNEE: Primary | ICD-10-CM

## 2022-10-03 RX ORDER — TRIAMCINOLONE ACETONIDE 40 MG/ML
40 INJECTION, SUSPENSION INTRA-ARTICULAR; INTRAMUSCULAR ONCE
Status: COMPLETED | OUTPATIENT
Start: 2022-10-03 | End: 2022-10-03

## 2022-10-03 RX ADMIN — TRIAMCINOLONE ACETONIDE 40 MG: 40 INJECTION, SUSPENSION INTRA-ARTICULAR; INTRAMUSCULAR at 14:15:00

## 2022-10-03 NOTE — PROCEDURES
Bilateral Knee Intra-articular Injection    Name: Cassidy Ramos   MRN: [id number]  Date: 10/3/2022     Clinical Indications:   Persistent knee pain refractory to conservative measures. After informed consent, the injection site was marked, sterilized with topical chlorhexidine antiseptic, and locally anesthetized with skin refrigerant. The patient was situation in a comfortable position. Using sterile technique: 2 mL of 0.5% Bupivicaine, 2 mL of 1% Lidocaine, and 1 mL of 40 mg/ml Triamcinolone was injected utilizing anterolateral approach with a 21 gauge needle. A band-aid was applied. The patient tolerated the procedure well. Disposition:   Return to clinic on an as needed basis. KRISTA Regalado, URI Orthopedic Surgery / Sports Medicine Specialist  [facility]  [address]   [facility]. [facility]  [email or web address]  t: [phone]  o: [phone]  f: [phone]          This note was dictated using Dragon software. While it was briefly proofread prior to completion, some grammatical, spelling, and word choice errors due to dictation may still occur.

## 2022-10-14 ENCOUNTER — PATIENT OUTREACH (OUTPATIENT)
Dept: CASE MANAGEMENT | Age: 77
End: 2022-10-14

## 2022-10-14 DIAGNOSIS — R73.9 HYPERGLYCEMIA: ICD-10-CM

## 2022-10-14 DIAGNOSIS — E78.2 HYPERLIPIDEMIA, MIXED: ICD-10-CM

## 2022-10-14 DIAGNOSIS — E66.01 OBESITY, MORBID (HCC): ICD-10-CM

## 2022-10-14 DIAGNOSIS — F41.9 ANXIETY: ICD-10-CM

## 2022-10-14 DIAGNOSIS — F32.A DEPRESSION, UNSPECIFIED DEPRESSION TYPE: ICD-10-CM

## 2022-10-14 DIAGNOSIS — I10 HYPERTENSION, UNSPECIFIED TYPE: ICD-10-CM

## 2022-11-15 ENCOUNTER — OFFICE VISIT (OUTPATIENT)
Dept: HEMATOLOGY/ONCOLOGY | Facility: HOSPITAL | Age: 77
End: 2022-11-15
Attending: INTERNAL MEDICINE
Payer: MEDICARE

## 2022-11-15 VITALS
HEART RATE: 110 BPM | DIASTOLIC BLOOD PRESSURE: 106 MMHG | RESPIRATION RATE: 20 BRPM | SYSTOLIC BLOOD PRESSURE: 140 MMHG | OXYGEN SATURATION: 96 % | TEMPERATURE: 98 F

## 2022-11-15 DIAGNOSIS — Z86.39 HISTORY OF IRON DEFICIENCY: ICD-10-CM

## 2022-11-15 DIAGNOSIS — Z85.3 HISTORY OF BREAST CANCER: Primary | ICD-10-CM

## 2022-11-15 PROCEDURE — 99214 OFFICE O/P EST MOD 30 MIN: CPT | Performed by: INTERNAL MEDICINE

## 2022-11-15 NOTE — PROGRESS NOTES
Dx: Breast cancer / Breast carcinoma female left. Dx: Iron deficiency anemia     Patient presents to clinic for yearly follow up. Mammogram SIRENA 2D+3D diagnostic bilateral views performed on 2/1/22 moderate suspicion for malignancy. Stereotactic RT breast biopsy performed on 2/2/22 multiple portions of radial scare in core biopsies with scattered calcifications and intraductal hyperplasia. On Tamoxifen. In September patient had Laparoscopic Cholecystectomy with Dr. Shiv Talbert. Last iron infusion given on 9-9-21.

## 2022-11-15 NOTE — PROGRESS NOTES
Patient presents with: Follow - Up: MD follow up    Pt here for follow up. Reports stable SOB with exertion; fatigue ok. Had gallbladder removal in September with Dr. Cassidy Lawrence; still having some pain with fatty meals.      Education Record    Learner:  Patient    Disease / Diagnosis: history of breast cancer    Barriers / Limitations:  None   Comments:    Method:  Brief focused   Comments:    General Topics:  Diet, Medication and Plan of care reviewed   Comments:    Outcome:  Shows understanding   Comments:

## 2022-11-21 ENCOUNTER — TELEPHONE (OUTPATIENT)
Dept: HEMATOLOGY/ONCOLOGY | Facility: HOSPITAL | Age: 77
End: 2022-11-21

## 2022-11-25 DIAGNOSIS — F32.A DEPRESSION, UNSPECIFIED DEPRESSION TYPE: ICD-10-CM

## 2022-11-29 RX ORDER — DULOXETIN HYDROCHLORIDE 20 MG/1
CAPSULE, DELAYED RELEASE ORAL
Qty: 90 CAPSULE | Refills: 0 | Status: SHIPPED | OUTPATIENT
Start: 2022-11-29

## 2022-11-29 RX ORDER — DULOXETIN HYDROCHLORIDE 60 MG/1
CAPSULE, DELAYED RELEASE ORAL
Qty: 90 CAPSULE | Refills: 0 | Status: SHIPPED | OUTPATIENT
Start: 2022-11-29

## 2022-11-29 NOTE — TELEPHONE ENCOUNTER
LOV: 6/10/22 (LINDSEY)     Last Refill:   DULOXETINE 60 MG Oral Cap DR Particles, 9/7/22, #90, 0 RF  DULOXETINE 20 MG Oral Cap DR Particles, 9/7/22, #90, 0 RF    Next OV: 12/13/22    Please authorize if acceptable. Thank you!

## 2022-12-05 ENCOUNTER — PATIENT OUTREACH (OUTPATIENT)
Dept: CASE MANAGEMENT | Age: 77
End: 2022-12-05

## 2022-12-05 NOTE — PROGRESS NOTES
Attempted to reach patient for CCM monthly outreach call. No answer, voicemail box is not set up. Will alma delia for follow up. Total time: 5 Minutes  Total Monthly time: 5 Minutes  Patient's medical record reviewed, including recent OV notes and test results.

## 2022-12-13 ENCOUNTER — OFFICE VISIT (OUTPATIENT)
Dept: FAMILY MEDICINE CLINIC | Facility: CLINIC | Age: 77
End: 2022-12-13
Payer: MEDICARE

## 2022-12-13 VITALS
SYSTOLIC BLOOD PRESSURE: 124 MMHG | HEIGHT: 66 IN | RESPIRATION RATE: 16 BRPM | DIASTOLIC BLOOD PRESSURE: 80 MMHG | BODY MASS INDEX: 40 KG/M2 | TEMPERATURE: 98 F | HEART RATE: 94 BPM

## 2022-12-13 DIAGNOSIS — E78.2 HYPERLIPIDEMIA, MIXED: Primary | ICD-10-CM

## 2022-12-13 DIAGNOSIS — R73.9 HYPERGLYCEMIA: ICD-10-CM

## 2022-12-13 DIAGNOSIS — I10 ESSENTIAL HYPERTENSION: ICD-10-CM

## 2022-12-13 DIAGNOSIS — E55.9 VITAMIN D DEFICIENCY: ICD-10-CM

## 2022-12-13 DIAGNOSIS — E66.01 OBESITY, MORBID (HCC): ICD-10-CM

## 2022-12-13 DIAGNOSIS — R06.09 DOE (DYSPNEA ON EXERTION): ICD-10-CM

## 2022-12-13 DIAGNOSIS — K44.9 HIATAL HERNIA: ICD-10-CM

## 2022-12-13 DIAGNOSIS — F32.A DEPRESSION, UNSPECIFIED DEPRESSION TYPE: ICD-10-CM

## 2022-12-13 PROCEDURE — 99214 OFFICE O/P EST MOD 30 MIN: CPT | Performed by: FAMILY MEDICINE

## 2022-12-13 RX ORDER — DULOXETIN HYDROCHLORIDE 60 MG/1
60 CAPSULE, DELAYED RELEASE ORAL DAILY
Qty: 90 CAPSULE | Refills: 1 | Status: SHIPPED | OUTPATIENT
Start: 2022-12-13

## 2022-12-13 RX ORDER — TRIAMCINOLONE ACETONIDE 1 MG/G
CREAM TOPICAL 2 TIMES DAILY PRN
Qty: 60 G | Refills: 0 | Status: SHIPPED | OUTPATIENT
Start: 2022-12-13

## 2022-12-13 RX ORDER — BENAZEPRIL HYDROCHLORIDE 20 MG/1
20 TABLET ORAL DAILY
Qty: 90 TABLET | Refills: 1 | Status: SHIPPED | OUTPATIENT
Start: 2022-12-13

## 2022-12-13 RX ORDER — ROSUVASTATIN CALCIUM 5 MG/1
5 TABLET, COATED ORAL EVERY EVENING
Qty: 90 TABLET | Refills: 1 | Status: SHIPPED | OUTPATIENT
Start: 2022-12-13

## 2022-12-13 RX ORDER — DULOXETIN HYDROCHLORIDE 20 MG/1
20 CAPSULE, DELAYED RELEASE ORAL DAILY
Qty: 90 CAPSULE | Refills: 1 | Status: SHIPPED | OUTPATIENT
Start: 2022-12-13

## 2022-12-13 RX ORDER — AMLODIPINE BESYLATE 5 MG/1
5 TABLET ORAL DAILY
Qty: 90 TABLET | Refills: 1 | Status: SHIPPED | OUTPATIENT
Start: 2022-12-13

## 2022-12-13 NOTE — PATIENT INSTRUCTIONS
Continue current meds. Watch diet for fats and carbs. Stay active. Consider seeing GI Dr. Andrew Lee to discuss hiatal hernia. Schedule Medicare wellness visit for June 2023.

## 2022-12-19 ENCOUNTER — TELEPHONE (OUTPATIENT)
Dept: FAMILY MEDICINE CLINIC | Facility: CLINIC | Age: 77
End: 2022-12-19

## 2022-12-19 ENCOUNTER — LAB ENCOUNTER (OUTPATIENT)
Dept: LAB | Age: 77
End: 2022-12-19
Attending: FAMILY MEDICINE
Payer: MEDICARE

## 2022-12-19 DIAGNOSIS — R73.9 HYPERGLYCEMIA: ICD-10-CM

## 2022-12-19 DIAGNOSIS — E78.2 HYPERLIPIDEMIA, MIXED: ICD-10-CM

## 2022-12-19 DIAGNOSIS — E55.9 VITAMIN D DEFICIENCY: ICD-10-CM

## 2022-12-19 LAB
ALBUMIN SERPL-MCNC: 3.6 G/DL (ref 3.4–5)
ALBUMIN/GLOB SERPL: 1.2 {RATIO} (ref 1–2)
ALP LIVER SERPL-CCNC: 57 U/L
ALT SERPL-CCNC: 21 U/L
ANION GAP SERPL CALC-SCNC: 8 MMOL/L (ref 0–18)
AST SERPL-CCNC: 19 U/L (ref 15–37)
BILIRUB SERPL-MCNC: 0.6 MG/DL (ref 0.1–2)
BUN BLD-MCNC: 25 MG/DL (ref 7–18)
BUN/CREAT SERPL: 24.3 (ref 10–20)
CALCIUM BLD-MCNC: 9.7 MG/DL (ref 8.5–10.1)
CHLORIDE SERPL-SCNC: 111 MMOL/L (ref 98–112)
CHOLEST SERPL-MCNC: 165 MG/DL (ref ?–200)
CO2 SERPL-SCNC: 24 MMOL/L (ref 21–32)
CREAT BLD-MCNC: 1.03 MG/DL
EST. AVERAGE GLUCOSE BLD GHB EST-MCNC: 105 MG/DL (ref 68–126)
FASTING PATIENT LIPID ANSWER: YES
FASTING STATUS PATIENT QL REPORTED: YES
GFR SERPLBLD BASED ON 1.73 SQ M-ARVRAT: 55 ML/MIN/1.73M2 (ref 60–?)
GLOBULIN PLAS-MCNC: 3 G/DL (ref 2.8–4.4)
GLUCOSE BLD-MCNC: 110 MG/DL (ref 70–99)
HBA1C MFR BLD: 5.3 % (ref ?–5.7)
HDLC SERPL-MCNC: 72 MG/DL (ref 40–59)
LDLC SERPL CALC-MCNC: 65 MG/DL (ref ?–100)
NONHDLC SERPL-MCNC: 93 MG/DL (ref ?–130)
OSMOLALITY SERPL CALC.SUM OF ELEC: 301 MOSM/KG (ref 275–295)
POTASSIUM SERPL-SCNC: 4.2 MMOL/L (ref 3.5–5.1)
PROT SERPL-MCNC: 6.6 G/DL (ref 6.4–8.2)
SODIUM SERPL-SCNC: 143 MMOL/L (ref 136–145)
TRIGL SERPL-MCNC: 173 MG/DL (ref 30–149)
VIT D+METAB SERPL-MCNC: 31 NG/ML (ref 30–100)
VLDLC SERPL CALC-MCNC: 26 MG/DL (ref 0–30)

## 2022-12-19 PROCEDURE — 80053 COMPREHEN METABOLIC PANEL: CPT

## 2022-12-19 PROCEDURE — 80061 LIPID PANEL: CPT

## 2022-12-19 PROCEDURE — 83036 HEMOGLOBIN GLYCOSYLATED A1C: CPT

## 2022-12-19 PROCEDURE — 82306 VITAMIN D 25 HYDROXY: CPT

## 2022-12-19 RX ORDER — TAMOXIFEN CITRATE 20 MG/1
TABLET ORAL
Qty: 90 TABLET | Refills: 1 | OUTPATIENT
Start: 2022-12-19

## 2022-12-19 RX ORDER — TAMOXIFEN CITRATE 20 MG/1
20 TABLET ORAL DAILY
Qty: 90 TABLET | Refills: 0 | Status: SHIPPED | OUTPATIENT
Start: 2022-12-19

## 2022-12-19 NOTE — TELEPHONE ENCOUNTER
Pt left paperwork for handicap parking here at her visit on 12/13. Coming today for lab work and was hoping to pick it up. No word from anyone regarding it.      Pls advise

## 2022-12-19 NOTE — TELEPHONE ENCOUNTER
Checked w brayan JASSO. She sts dr Vinay Hercules has form for completion, brayan will notify pt once form completed.

## 2022-12-20 ENCOUNTER — TELEPHONE (OUTPATIENT)
Dept: FAMILY MEDICINE CLINIC | Facility: CLINIC | Age: 77
End: 2022-12-20

## 2023-01-18 ENCOUNTER — PATIENT OUTREACH (OUTPATIENT)
Dept: CASE MANAGEMENT | Age: 78
End: 2023-01-18

## 2023-01-18 DIAGNOSIS — I10 HYPERTENSION, UNSPECIFIED TYPE: ICD-10-CM

## 2023-01-18 DIAGNOSIS — Z85.3 HISTORY OF BREAST CANCER: ICD-10-CM

## 2023-01-18 DIAGNOSIS — E78.2 HYPERLIPIDEMIA, MIXED: ICD-10-CM

## 2023-01-18 DIAGNOSIS — F32.A DEPRESSION, UNSPECIFIED DEPRESSION TYPE: ICD-10-CM

## 2023-01-18 DIAGNOSIS — R73.9 HYPERGLYCEMIA: ICD-10-CM

## 2023-01-18 DIAGNOSIS — F41.9 ANXIETY: ICD-10-CM

## 2023-01-23 ENCOUNTER — TELEPHONE (OUTPATIENT)
Dept: ORTHOPEDICS CLINIC | Facility: CLINIC | Age: 78
End: 2023-01-23

## 2023-01-23 ENCOUNTER — TELEPHONE (OUTPATIENT)
Dept: FAMILY MEDICINE CLINIC | Facility: CLINIC | Age: 78
End: 2023-01-23

## 2023-01-23 PROBLEM — C50.912 BREAST CARCINOMA, FEMALE, LEFT (HCC): Status: RESOLVED | Noted: 2018-01-18 | Resolved: 2023-01-23

## 2023-01-23 NOTE — TELEPHONE ENCOUNTER
Pt had bilateral Kenalog injections on 10/3/22, needs appt for eval/possible injection. 90 days was 1/3/23. Thanks.

## 2023-01-23 NOTE — TELEPHONE ENCOUNTER
Spoke with patient for CCM. Patient states that she has attempted to apply for a lower cost insurance, but was denied to to current dx of breast cancer. Patient is in remission and is asking if this can be changed to \"history of breast cancer\" to avoid this in the future.

## 2023-01-23 NOTE — TELEPHONE ENCOUNTER
Spoke with patient for CCM. Patient had cortisone injection on 10/3. During call she stated that her pain is starting to return. She is reporting that her current pain level is a 5/10. Patient wanting to know when she will be eligible for another injection. She was told to return to ortho in 6 months, however does not believe that she will be able to hold off until April. Please advise.

## 2023-01-24 NOTE — TELEPHONE ENCOUNTER
Diagnosis of the breast cancer was placed by Dr. Brandon Fonseca. I was trying to put it into the history however it is diagnosis associated with tamoxifen I was not able to change it. I would like her to contact Dr. Iron Camacho office regarding her request.  I always used diagnosis is a history of cancer, the most recent notes by Dr. Brandon Fonseca are also using history of breast cancer so the insurance would ask for office notes the would know but it is history of cancer.   Thank you

## 2023-01-24 NOTE — TELEPHONE ENCOUNTER
Future Appointments   Date Time Provider Aaron Francisco   1/31/2023  2:10 PM STEFANIE Simms [facility] [place] [facility] Lora Carlson [id number]       Patient is scheduled for Re-eval and Kenalog Inj per your request.

## 2023-01-31 ENCOUNTER — OFFICE VISIT (OUTPATIENT)
Dept: ORTHOPEDICS CLINIC | Facility: CLINIC | Age: 78
End: 2023-01-31
Payer: MEDICARE

## 2023-01-31 DIAGNOSIS — M17.0 BILATERAL PRIMARY OSTEOARTHRITIS OF KNEE: Primary | ICD-10-CM

## 2023-01-31 PROCEDURE — 99215 OFFICE O/P EST HI 40 MIN: CPT | Performed by: PHYSICIAN ASSISTANT

## 2023-02-03 ENCOUNTER — TELEPHONE (OUTPATIENT)
Dept: ORTHOPEDICS CLINIC | Facility: CLINIC | Age: 78
End: 2023-02-03

## 2023-02-03 NOTE — TELEPHONE ENCOUNTER
Future Appointments   Date Time Provider Aaron Francisco   2/23/2023  1:00 PM [facility] RM3 [facility]   2/27/2023  1:30 PM STEFANIE Torrez [id number]

## 2023-02-21 ENCOUNTER — PATIENT OUTREACH (OUTPATIENT)
Dept: CASE MANAGEMENT | Age: 78
End: 2023-02-21

## 2023-02-21 DIAGNOSIS — Z85.3 HISTORY OF BREAST CANCER: ICD-10-CM

## 2023-02-21 DIAGNOSIS — E78.2 HYPERLIPIDEMIA, MIXED: ICD-10-CM

## 2023-02-21 DIAGNOSIS — D50.9 IRON DEFICIENCY ANEMIA, UNSPECIFIED IRON DEFICIENCY ANEMIA TYPE: ICD-10-CM

## 2023-02-21 DIAGNOSIS — F32.A DEPRESSION, UNSPECIFIED DEPRESSION TYPE: ICD-10-CM

## 2023-02-21 DIAGNOSIS — E66.01 OBESITY, MORBID (HCC): ICD-10-CM

## 2023-02-21 DIAGNOSIS — I10 HYPERTENSION, UNSPECIFIED TYPE: ICD-10-CM

## 2023-02-21 DIAGNOSIS — F41.9 ANXIETY: ICD-10-CM

## 2023-02-21 DIAGNOSIS — R73.9 HYPERGLYCEMIA: ICD-10-CM

## 2023-02-23 ENCOUNTER — TELEPHONE (OUTPATIENT)
Dept: HEMATOLOGY/ONCOLOGY | Facility: HOSPITAL | Age: 78
End: 2023-02-23

## 2023-02-23 ENCOUNTER — HOSPITAL ENCOUNTER (OUTPATIENT)
Dept: MAMMOGRAPHY | Facility: HOSPITAL | Age: 78
Discharge: HOME | End: 2023-02-23
Attending: INTERNAL MEDICINE
Payer: MEDICARE

## 2023-02-23 DIAGNOSIS — Z85.3 HISTORY OF BREAST CANCER: ICD-10-CM

## 2023-02-23 PROCEDURE — 77066 DX MAMMO INCL CAD BI: CPT | Performed by: INTERNAL MEDICINE

## 2023-02-23 PROCEDURE — 77062 BREAST TOMOSYNTHESIS BI: CPT | Performed by: INTERNAL MEDICINE

## 2023-02-23 NOTE — TELEPHONE ENCOUNTER
Spoke to patient and Dr Bianca Schroeder. She should see him. The finding is probably just a scar and has been there for some time. He has seen her in the past for GB surgery. I will ask her to call his office.   JULI Casanova

## 2023-03-01 ENCOUNTER — OFFICE VISIT (OUTPATIENT)
Dept: ORTHOPEDICS CLINIC | Facility: CLINIC | Age: 78
End: 2023-03-01
Payer: MEDICARE

## 2023-03-01 DIAGNOSIS — M17.0 BILATERAL PRIMARY OSTEOARTHRITIS OF KNEE: Primary | ICD-10-CM

## 2023-03-01 PROCEDURE — 20610 DRAIN/INJ JOINT/BURSA W/O US: CPT | Performed by: PHYSICIAN ASSISTANT

## 2023-03-01 PROCEDURE — 1125F AMNT PAIN NOTED PAIN PRSNT: CPT | Performed by: PHYSICIAN ASSISTANT

## 2023-03-01 NOTE — PROCEDURES
Bilateral Knee Intra-articular Injection    Name: Papi Allan   MRN: [id number]  Date: 3/1/2023     Clinical Indications:   Knee Osteoarthritis with symptoms refractory to conservative measures. After informed consent, the injection site was marked, sterilized with topical chlorhexidine antiseptic, and locally anesthetized with skin refrigerant. The patient was situation in a comfortable position. Using sterile technique: Bernard Lopez bilateral was injected utilizing anterolateral approach with a 21 gauge needle. A band-aid was applied. The patient tolerated the procedure well. Disposition:   As needed. KRISTA Goodman, PAMICHELLE Orthopedic Surgery / Sports Medicine Specialist  [facility]  [address], [address], [address]   [email or web address]  [email or web address]  t: [phone]  o: [phone]  f: [phone]          This note was dictated using Dragon software. While it was briefly proofread prior to completion, some grammatical, spelling, and word choice errors due to dictation may still occur.

## 2023-03-13 DIAGNOSIS — F32.A DEPRESSION, UNSPECIFIED DEPRESSION TYPE: ICD-10-CM

## 2023-03-13 RX ORDER — DULOXETIN HYDROCHLORIDE 20 MG/1
CAPSULE, DELAYED RELEASE ORAL
Qty: 90 CAPSULE | Refills: 1 | OUTPATIENT
Start: 2023-03-13

## 2023-03-17 ENCOUNTER — LAB ENCOUNTER (OUTPATIENT)
Dept: LAB | Age: 78
End: 2023-03-17
Attending: INTERNAL MEDICINE
Payer: MEDICARE

## 2023-03-17 DIAGNOSIS — Z86.39 HISTORY OF IRON DEFICIENCY: ICD-10-CM

## 2023-03-17 LAB
BASOPHILS # BLD AUTO: 0.11 X10(3) UL (ref 0–0.2)
BASOPHILS NFR BLD AUTO: 0.9 %
DEPRECATED HBV CORE AB SER IA-ACNC: 41.9 NG/ML
EOSINOPHIL # BLD AUTO: 0.21 X10(3) UL (ref 0–0.7)
EOSINOPHIL NFR BLD AUTO: 1.8 %
ERYTHROCYTE [DISTWIDTH] IN BLOOD BY AUTOMATED COUNT: 13.3 %
HCT VFR BLD AUTO: 46.1 %
HGB BLD-MCNC: 14.9 G/DL
IMM GRANULOCYTES # BLD AUTO: 0.05 X10(3) UL (ref 0–1)
IMM GRANULOCYTES NFR BLD: 0.4 %
IRON SATN MFR SERPL: 36 %
IRON SERPL-MCNC: 161 UG/DL
LYMPHOCYTES # BLD AUTO: 3.94 X10(3) UL (ref 1–4)
LYMPHOCYTES NFR BLD AUTO: 33.5 %
MCH RBC QN AUTO: 31 PG (ref 26–34)
MCHC RBC AUTO-ENTMCNC: 32.3 G/DL (ref 31–37)
MCV RBC AUTO: 96 FL
MONOCYTES # BLD AUTO: 0.72 X10(3) UL (ref 0.1–1)
MONOCYTES NFR BLD AUTO: 6.1 %
NEUTROPHILS # BLD AUTO: 6.72 X10 (3) UL (ref 1.5–7.7)
NEUTROPHILS # BLD AUTO: 6.72 X10(3) UL (ref 1.5–7.7)
NEUTROPHILS NFR BLD AUTO: 57.3 %
PLATELET # BLD AUTO: 281 10(3)UL (ref 150–450)
RBC # BLD AUTO: 4.8 X10(6)UL
TIBC SERPL-MCNC: 443 UG/DL (ref 240–450)
TRANSFERRIN SERPL-MCNC: 297 MG/DL (ref 200–360)
WBC # BLD AUTO: 11.8 X10(3) UL (ref 4–11)

## 2023-03-17 PROCEDURE — 85025 COMPLETE CBC W/AUTO DIFF WBC: CPT

## 2023-03-17 PROCEDURE — 82728 ASSAY OF FERRITIN: CPT

## 2023-03-17 PROCEDURE — 83540 ASSAY OF IRON: CPT

## 2023-03-17 PROCEDURE — 83550 IRON BINDING TEST: CPT

## 2023-03-23 DIAGNOSIS — Z17.0 MALIGNANT NEOPLASM OF RIGHT BREAST IN FEMALE, ESTROGEN RECEPTOR POSITIVE, UNSPECIFIED SITE OF BREAST (HCC): Primary | ICD-10-CM

## 2023-03-23 DIAGNOSIS — C50.911 MALIGNANT NEOPLASM OF RIGHT BREAST IN FEMALE, ESTROGEN RECEPTOR POSITIVE, UNSPECIFIED SITE OF BREAST (HCC): Primary | ICD-10-CM

## 2023-03-23 RX ORDER — TAMOXIFEN CITRATE 20 MG/1
20 TABLET ORAL DAILY
Qty: 90 TABLET | Refills: 0 | Status: SHIPPED | OUTPATIENT
Start: 2023-03-23

## 2023-03-29 ENCOUNTER — PATIENT OUTREACH (OUTPATIENT)
Dept: CASE MANAGEMENT | Age: 78
End: 2023-03-29

## 2023-03-29 DIAGNOSIS — E78.2 HYPERLIPIDEMIA, MIXED: ICD-10-CM

## 2023-03-29 DIAGNOSIS — I10 HYPERTENSION, UNSPECIFIED TYPE: ICD-10-CM

## 2023-03-29 DIAGNOSIS — F41.9 ANXIETY: ICD-10-CM

## 2023-03-29 DIAGNOSIS — D50.9 IRON DEFICIENCY ANEMIA, UNSPECIFIED IRON DEFICIENCY ANEMIA TYPE: ICD-10-CM

## 2023-03-29 DIAGNOSIS — F32.A DEPRESSION, UNSPECIFIED DEPRESSION TYPE: ICD-10-CM

## 2023-03-29 DIAGNOSIS — E66.01 OBESITY, MORBID (HCC): ICD-10-CM

## 2023-04-04 DIAGNOSIS — M25.562 CHRONIC PAIN OF LEFT KNEE: ICD-10-CM

## 2023-04-04 DIAGNOSIS — M25.561 CHRONIC PAIN OF RIGHT KNEE: ICD-10-CM

## 2023-04-04 DIAGNOSIS — G89.29 CHRONIC PAIN OF RIGHT KNEE: ICD-10-CM

## 2023-04-04 DIAGNOSIS — G89.29 CHRONIC PAIN OF LEFT KNEE: ICD-10-CM

## 2023-04-05 RX ORDER — CELECOXIB 200 MG/1
CAPSULE ORAL
Qty: 90 CAPSULE | Refills: 0 | Status: SHIPPED | OUTPATIENT
Start: 2023-04-05

## 2023-04-18 ENCOUNTER — MED REC SCAN ONLY (OUTPATIENT)
Dept: FAMILY MEDICINE CLINIC | Facility: CLINIC | Age: 78
End: 2023-04-18

## 2023-05-02 ENCOUNTER — PATIENT OUTREACH (OUTPATIENT)
Dept: CASE MANAGEMENT | Age: 78
End: 2023-05-02

## 2023-05-02 DIAGNOSIS — R73.9 HYPERGLYCEMIA: ICD-10-CM

## 2023-05-02 DIAGNOSIS — F32.A DEPRESSION, UNSPECIFIED DEPRESSION TYPE: ICD-10-CM

## 2023-05-02 DIAGNOSIS — E66.01 OBESITY, MORBID (HCC): ICD-10-CM

## 2023-05-02 DIAGNOSIS — I10 HYPERTENSION, UNSPECIFIED TYPE: ICD-10-CM

## 2023-05-02 DIAGNOSIS — E78.2 HYPERLIPIDEMIA, MIXED: ICD-10-CM

## 2023-05-02 DIAGNOSIS — F41.9 ANXIETY: ICD-10-CM

## 2023-05-02 PROBLEM — I25.10 CORONARY ARTERY DISEASE INVOLVING NATIVE CORONARY ARTERY OF NATIVE HEART: Status: ACTIVE | Noted: 2019-04-22

## 2023-05-24 ENCOUNTER — MED REC SCAN ONLY (OUTPATIENT)
Dept: FAMILY MEDICINE CLINIC | Facility: CLINIC | Age: 78
End: 2023-05-24

## 2023-06-12 DIAGNOSIS — G89.29 CHRONIC PAIN OF RIGHT KNEE: ICD-10-CM

## 2023-06-12 DIAGNOSIS — M25.562 CHRONIC PAIN OF LEFT KNEE: ICD-10-CM

## 2023-06-12 DIAGNOSIS — M25.561 CHRONIC PAIN OF RIGHT KNEE: ICD-10-CM

## 2023-06-12 DIAGNOSIS — G89.29 CHRONIC PAIN OF LEFT KNEE: ICD-10-CM

## 2023-06-12 RX ORDER — CELECOXIB 200 MG/1
200 CAPSULE ORAL DAILY
Qty: 90 CAPSULE | Refills: 0 | Status: SHIPPED | OUTPATIENT
Start: 2023-06-12

## 2023-06-12 NOTE — TELEPHONE ENCOUNTER
Pt requesting refill of CELECOXIB 200 MG Oral Cap to [facility] [address] [facility], [address] [facility] OF [address] [place] [address], [phone], [phone]. Pt states pharmacy submitted request w/ no response. Please send rx if appropriate, thank you.

## 2023-06-21 ENCOUNTER — PATIENT OUTREACH (OUTPATIENT)
Dept: CASE MANAGEMENT | Age: 78
End: 2023-06-21

## 2023-06-26 ENCOUNTER — MED REC SCAN ONLY (OUTPATIENT)
Dept: FAMILY MEDICINE CLINIC | Facility: CLINIC | Age: 78
End: 2023-06-26

## 2023-07-10 DIAGNOSIS — E78.2 HYPERLIPIDEMIA, MIXED: ICD-10-CM

## 2023-07-10 RX ORDER — ROSUVASTATIN CALCIUM 5 MG/1
5 TABLET, COATED ORAL EVERY EVENING
Qty: 90 TABLET | Refills: 0 | Status: SHIPPED | OUTPATIENT
Start: 2023-07-10

## 2023-07-10 NOTE — TELEPHONE ENCOUNTER
LOV: 3/31/23     Last Refill:  rosuvastatin 5 MG Oral Tab #90tablets 1Refill    Next Appointment:  N/A    Protocol: Passed    Routing to Dr. Harmeet Holbrook to see if she can see the Pt sooner for MAW. Please advise when to schedule.

## 2023-07-10 NOTE — TELEPHONE ENCOUNTER
Pt requesting refill of rosuvastatin 5 MG Oral Tab to [address] [place] [address], [address] [facility] [address] AT [facility] OF [address] [place] [address] Avenue, [phone], [phone]     Pt has 1 tablet left. Pt also requesting to schedule MAW, offered next available 10/17/23, pt states she would like to be seen sooner than this date. Please advise if / when to schedule?

## 2023-07-18 DIAGNOSIS — I10 ESSENTIAL HYPERTENSION: ICD-10-CM

## 2023-07-18 NOTE — TELEPHONE ENCOUNTER
LOV: 12/13/22  Next OV: The patient is asked to return in June 2023 for Medicare wellness visit. Last Refill:12/13/22           Medication Quantity Refills Start End   amLODIPine 5 MG Oral Tab 90 tablet 1 12/13/2022    Sig:   Take 1 tablet (5 mg total) by mouth daily. Pt attempting to schedule MAW but next available is 10/17/23. Hoping to be seen sooner. Please authorize if acceptable. Thank you!

## 2023-07-18 NOTE — TELEPHONE ENCOUNTER
Ok to send 30 pills for each and then ask Dr. Alek Booth where she would like patient to be scheduled.

## 2023-07-19 RX ORDER — BENAZEPRIL HYDROCHLORIDE 20 MG/1
20 TABLET ORAL DAILY
Qty: 30 TABLET | Refills: 0 | Status: SHIPPED | OUTPATIENT
Start: 2023-07-19

## 2023-07-19 RX ORDER — AMLODIPINE BESYLATE 5 MG/1
5 TABLET ORAL DAILY
Qty: 30 TABLET | Refills: 0 | Status: SHIPPED | OUTPATIENT
Start: 2023-07-19

## 2023-08-02 ENCOUNTER — TELEPHONE (OUTPATIENT)
Dept: ORTHOPEDICS CLINIC | Facility: CLINIC | Age: 78
End: 2023-08-02

## 2023-08-02 DIAGNOSIS — M17.0 BILATERAL PRIMARY OSTEOARTHRITIS OF KNEE: Primary | ICD-10-CM

## 2023-08-02 NOTE — TELEPHONE ENCOUNTER
Rajani Perez helped and lasted around 4 months. Requesting for bilateral knees.    Last injection:03/01/23  Order pending

## 2023-08-02 NOTE — TELEPHONE ENCOUNTER
Spoke with patient for [facility] monthly outreach. Patient reporting continued issues with pain in her right knee, she rates her pain 5/10 at this time. She was previously taking extra strength tylenol PRN for pain relief, but states this did not provide relief. Patient last seen Ortho in 3/1 for Zilretta injection and is wondering when would be due for another injection. Please advise.

## 2023-08-15 ENCOUNTER — TELEPHONE (OUTPATIENT)
Dept: FAMILY MEDICINE CLINIC | Facility: CLINIC | Age: 78
End: 2023-08-15

## 2023-08-15 NOTE — TELEPHONE ENCOUNTER
Patient calling requesting a sooner MAW appt. I attempted to book patient with next available which is 11/27 patient stated that this was too far away and would like to be seen sooner by Dr. Sharon Esqueda. Please advise. Thank you!

## 2023-08-16 ENCOUNTER — OFFICE VISIT (OUTPATIENT)
Dept: HEMATOLOGY/ONCOLOGY | Facility: HOSPITAL | Age: 78
End: 2023-08-16
Attending: INTERNAL MEDICINE
Payer: MEDICARE

## 2023-08-16 VITALS
HEART RATE: 114 BPM | DIASTOLIC BLOOD PRESSURE: 99 MMHG | SYSTOLIC BLOOD PRESSURE: 137 MMHG | TEMPERATURE: 98 F | OXYGEN SATURATION: 96 %

## 2023-08-16 DIAGNOSIS — C50.911 MALIGNANT NEOPLASM OF RIGHT BREAST IN FEMALE, ESTROGEN RECEPTOR POSITIVE, UNSPECIFIED SITE OF BREAST: ICD-10-CM

## 2023-08-16 DIAGNOSIS — N93.9 VAGINAL BLEEDING: Primary | ICD-10-CM

## 2023-08-16 DIAGNOSIS — D50.0 IRON DEFICIENCY ANEMIA DUE TO CHRONIC BLOOD LOSS: ICD-10-CM

## 2023-08-16 DIAGNOSIS — Z17.0 MALIGNANT NEOPLASM OF RIGHT BREAST IN FEMALE, ESTROGEN RECEPTOR POSITIVE, UNSPECIFIED SITE OF BREAST: ICD-10-CM

## 2023-08-16 DIAGNOSIS — Z86.39 HISTORY OF IRON DEFICIENCY: ICD-10-CM

## 2023-08-16 PROCEDURE — 99215 OFFICE O/P EST HI 40 MIN: CPT | Performed by: INTERNAL MEDICINE

## 2023-08-16 NOTE — PATIENT INSTRUCTIONS
STOP TAMOXIFEN  MAKE AN APPOINTMENT TO SEE A GYNECOLOGIST AS SOON AS  POSSIBLE TO DISCUSS VAGINAL BLEEDING  SEE ME IN 6 MONTHS

## 2023-08-22 ENCOUNTER — OFFICE VISIT (OUTPATIENT)
Dept: ORTHOPEDICS CLINIC | Facility: CLINIC | Age: 78
End: 2023-08-22
Payer: MEDICARE

## 2023-08-22 DIAGNOSIS — M17.0 BILATERAL PRIMARY OSTEOARTHRITIS OF KNEE: Primary | ICD-10-CM

## 2023-08-22 PROCEDURE — 1125F AMNT PAIN NOTED PAIN PRSNT: CPT | Performed by: PHYSICIAN ASSISTANT

## 2023-08-22 PROCEDURE — 20610 DRAIN/INJ JOINT/BURSA W/O US: CPT | Performed by: PHYSICIAN ASSISTANT

## 2023-08-22 NOTE — PROCEDURES
Bilateral Knee Intra-articular Injection    Name: Yoandy Ardon   MRN: [id number]  Date: 8/22/2023     Clinical Indications:   Persistent knee pain refractory to conservative measures. After informed consent, the injection site was marked, sterilized with topical chlorhexidine antiseptic, and locally anesthetized with skin refrigerant. The patient was situation in a comfortable position. Using sterile technique: premixed Sydni Reynolds was injected utilizing anterolateral approach with a 21 gauge needle. A band-aid was applied. The patient tolerated the procedure well. Disposition:   Return to clinic on an as needed basis. KRISTA Armas, PAKalebC Orthopedic Surgery / Sports Medicine Specialist   [facility]  [address], [address]   [facility]. [facility]  [email or web address]  t: [phone]  o: [phone]  f: [phone]          This note was dictated using Dragon software. While it was briefly proofread prior to completion, some grammatical, spelling, and word choice errors due to dictation may still occur.

## 2023-08-30 ENCOUNTER — HOSPITAL ENCOUNTER (OUTPATIENT)
Dept: ULTRASOUND IMAGING | Age: 78
Discharge: HOME | End: 2023-08-30
Attending: INTERNAL MEDICINE
Payer: MEDICARE

## 2023-08-30 DIAGNOSIS — F32.A DEPRESSION, UNSPECIFIED DEPRESSION TYPE: ICD-10-CM

## 2023-08-30 DIAGNOSIS — N93.9 VAGINAL BLEEDING: ICD-10-CM

## 2023-08-30 DIAGNOSIS — I10 ESSENTIAL HYPERTENSION: ICD-10-CM

## 2023-08-30 PROCEDURE — 76856 US EXAM PELVIC COMPLETE: CPT | Performed by: INTERNAL MEDICINE

## 2023-08-30 PROCEDURE — 76830 TRANSVAGINAL US NON-OB: CPT | Performed by: INTERNAL MEDICINE

## 2023-08-30 RX ORDER — AMLODIPINE BESYLATE 5 MG/1
5 TABLET ORAL DAILY
Qty: 30 TABLET | Refills: 1 | Status: SHIPPED | OUTPATIENT
Start: 2023-08-30

## 2023-08-31 RX ORDER — DULOXETIN HYDROCHLORIDE 60 MG/1
60 CAPSULE, DELAYED RELEASE ORAL DAILY
Qty: 90 CAPSULE | Refills: 0 | Status: SHIPPED | OUTPATIENT
Start: 2023-08-31

## 2023-09-01 ENCOUNTER — TELEPHONE (OUTPATIENT)
Dept: HEMATOLOGY/ONCOLOGY | Facility: HOSPITAL | Age: 78
End: 2023-09-01

## 2023-09-06 ENCOUNTER — HOSPITAL ENCOUNTER (OUTPATIENT)
Dept: MAMMOGRAPHY | Facility: HOSPITAL | Age: 78
Discharge: HOME | End: 2023-09-06
Attending: SURGERY
Payer: MEDICARE

## 2023-09-06 DIAGNOSIS — C50.411 MALIGNANT NEOPLASM OF UPPER-OUTER QUADRANT OF RIGHT FEMALE BREAST (HCC): ICD-10-CM

## 2023-09-06 DIAGNOSIS — N63.10 MASS OF RIGHT BREAST, UNSPECIFIED QUADRANT: ICD-10-CM

## 2023-09-06 DIAGNOSIS — F32.A DEPRESSION, UNSPECIFIED DEPRESSION TYPE: ICD-10-CM

## 2023-09-06 PROCEDURE — 77061 BREAST TOMOSYNTHESIS UNI: CPT | Performed by: SURGERY

## 2023-09-06 PROCEDURE — 77065 DX MAMMO INCL CAD UNI: CPT | Performed by: SURGERY

## 2023-09-06 RX ORDER — DULOXETIN HYDROCHLORIDE 20 MG/1
20 CAPSULE, DELAYED RELEASE ORAL DAILY
Qty: 90 CAPSULE | Refills: 0 | OUTPATIENT
Start: 2023-09-06

## 2023-09-06 NOTE — TELEPHONE ENCOUNTER
Pt requesting rx refill for:    DULoxetine 20 MG Oral Cap DR Warren 90 capsule 1 12/13/2022       Rx to:  [id number] - [address] AT [address], [phone], [phone]

## 2023-09-06 NOTE — TELEPHONE ENCOUNTER
LUIS MIGUEL:[id number]/68/4502   for: Medication Follow-Up   Patient advised to RTC on:  June 2023 for Medicare wellness visit. Nov:10/02/2023  Medication Quantity Refills Start End   DULoxetine 60 MG Oral Cap DR Particles 90 capsule 0 8/31/2023    Sig:   Take 1 capsule (60 mg total) by mouth daily.

## 2023-09-07 DIAGNOSIS — F32.A DEPRESSION, UNSPECIFIED DEPRESSION TYPE: ICD-10-CM

## 2023-09-07 RX ORDER — DULOXETIN HYDROCHLORIDE 20 MG/1
20 CAPSULE, DELAYED RELEASE ORAL DAILY
Qty: 90 CAPSULE | Refills: 0 | Status: SHIPPED | OUTPATIENT
Start: 2023-09-07

## 2023-09-07 NOTE — TELEPHONE ENCOUNTER
Pt would like refill of DULoxetine 20 MG Oral Cap DR Kelley sent to [facility] [address] [facility], [address] AT [facility] OF [address] [place] [address], [phone], [phone] [[id number]] she is almost out. Pt states she takes the 20mg and 60 mg daily. Thank you.

## 2023-09-18 ENCOUNTER — TELEPHONE (OUTPATIENT)
Dept: HEMATOLOGY/ONCOLOGY | Facility: HOSPITAL | Age: 78
End: 2023-09-18

## 2023-09-29 DIAGNOSIS — G89.29 CHRONIC PAIN OF RIGHT KNEE: ICD-10-CM

## 2023-09-29 DIAGNOSIS — E78.2 HYPERLIPIDEMIA, MIXED: ICD-10-CM

## 2023-09-29 DIAGNOSIS — M25.562 CHRONIC PAIN OF LEFT KNEE: ICD-10-CM

## 2023-09-29 DIAGNOSIS — M25.561 CHRONIC PAIN OF RIGHT KNEE: ICD-10-CM

## 2023-09-29 DIAGNOSIS — G89.29 CHRONIC PAIN OF LEFT KNEE: ICD-10-CM

## 2023-09-29 RX ORDER — CELECOXIB 200 MG/1
200 CAPSULE ORAL DAILY
Qty: 30 CAPSULE | Refills: 0 | Status: SHIPPED | OUTPATIENT
Start: 2023-09-29 | End: 2023-10-02

## 2023-09-29 RX ORDER — ROSUVASTATIN CALCIUM 5 MG/1
5 TABLET, COATED ORAL EVERY EVENING
Qty: 30 TABLET | Refills: 0 | Status: SHIPPED | OUTPATIENT
Start: 2023-09-29 | End: 2023-10-02

## 2023-09-29 NOTE — TELEPHONE ENCOUNTER
LOV:  12/13/2022 for: Medication Follow-Up   Patient advised to RTC on:  June 2023   Nov;10/02/2023    Medication Quantity Refills Start End   celecoxib 200 MG Oral Cap 90 capsule 0 6/12/2023    Sig:   Take 1 capsule (200 mg total) by mouth daily. Medication Quantity Refills Start End   rosuvastatin 5 MG Oral Tab 90 tablet 0 7/10/2023    Sig:   Take 1 tablet (5 mg total) by mouth every evening.

## 2023-10-02 ENCOUNTER — OFFICE VISIT (OUTPATIENT)
Dept: FAMILY MEDICINE CLINIC | Facility: CLINIC | Age: 78
End: 2023-10-02
Payer: MEDICARE

## 2023-10-02 VITALS
TEMPERATURE: 97 F | HEIGHT: 66 IN | HEART RATE: 110 BPM | DIASTOLIC BLOOD PRESSURE: 86 MMHG | RESPIRATION RATE: 18 BRPM | SYSTOLIC BLOOD PRESSURE: 126 MMHG | BODY MASS INDEX: 40 KG/M2

## 2023-10-02 DIAGNOSIS — F32.A DEPRESSION, UNSPECIFIED DEPRESSION TYPE: ICD-10-CM

## 2023-10-02 DIAGNOSIS — R73.9 HYPERGLYCEMIA: ICD-10-CM

## 2023-10-02 DIAGNOSIS — M25.562 CHRONIC PAIN OF LEFT KNEE: ICD-10-CM

## 2023-10-02 DIAGNOSIS — E66.01 OBESITY, MORBID (HCC): ICD-10-CM

## 2023-10-02 DIAGNOSIS — R00.0 TACHYCARDIA: ICD-10-CM

## 2023-10-02 DIAGNOSIS — E27.8 ADRENAL NODULE (HCC): ICD-10-CM

## 2023-10-02 DIAGNOSIS — I10 ESSENTIAL HYPERTENSION: ICD-10-CM

## 2023-10-02 DIAGNOSIS — Z23 NEED FOR VACCINATION: ICD-10-CM

## 2023-10-02 DIAGNOSIS — E55.9 VITAMIN D DEFICIENCY: ICD-10-CM

## 2023-10-02 DIAGNOSIS — M25.561 CHRONIC PAIN OF RIGHT KNEE: ICD-10-CM

## 2023-10-02 DIAGNOSIS — G89.29 CHRONIC PAIN OF RIGHT KNEE: ICD-10-CM

## 2023-10-02 DIAGNOSIS — K44.9 HIATAL HERNIA: ICD-10-CM

## 2023-10-02 DIAGNOSIS — Z00.00 ENCOUNTER FOR ANNUAL HEALTH EXAMINATION: Primary | ICD-10-CM

## 2023-10-02 DIAGNOSIS — E78.2 HYPERLIPIDEMIA, MIXED: ICD-10-CM

## 2023-10-02 DIAGNOSIS — G89.29 CHRONIC PAIN OF LEFT KNEE: ICD-10-CM

## 2023-10-02 RX ORDER — AMLODIPINE BESYLATE 5 MG/1
5 TABLET ORAL DAILY
Qty: 90 TABLET | Refills: 1 | Status: SHIPPED | OUTPATIENT
Start: 2023-10-02

## 2023-10-02 RX ORDER — BENAZEPRIL HYDROCHLORIDE 20 MG/1
20 TABLET ORAL DAILY
Qty: 90 TABLET | Refills: 1 | Status: SHIPPED | OUTPATIENT
Start: 2023-10-02

## 2023-10-02 RX ORDER — DULOXETIN HYDROCHLORIDE 60 MG/1
60 CAPSULE, DELAYED RELEASE ORAL DAILY
Qty: 90 CAPSULE | Refills: 1 | Status: SHIPPED | OUTPATIENT
Start: 2023-10-02

## 2023-10-02 RX ORDER — CELECOXIB 200 MG/1
200 CAPSULE ORAL DAILY
Qty: 90 CAPSULE | Refills: 1 | Status: SHIPPED | OUTPATIENT
Start: 2023-10-02

## 2023-10-02 RX ORDER — DULOXETIN HYDROCHLORIDE 20 MG/1
20 CAPSULE, DELAYED RELEASE ORAL DAILY
Qty: 90 CAPSULE | Refills: 0 | Status: SHIPPED | OUTPATIENT
Start: 2023-10-02

## 2023-10-02 RX ORDER — ROSUVASTATIN CALCIUM 5 MG/1
5 TABLET, COATED ORAL EVERY EVENING
Qty: 90 TABLET | Refills: 1 | Status: SHIPPED | OUTPATIENT
Start: 2023-10-02

## 2023-10-02 NOTE — PATIENT INSTRUCTIONS
Complete COVID booster shot and RSV shot. Tdap tetanus shot  Shingles shingles vaccination. Continue current meds. Watch diet for fats and carbs. Stay active. Return for fasting blood work. Schedule medication check for March 2024. [address] SCREENING SCHEDULE   Tests on this list are recommended by your physician but may not be covered, or covered at this frequency, by your insurer. Please check with your insurance carrier before scheduling to verify coverage.    PREVENTATIVE SERVICES FREQUENCY &  COVERAGE DETAILS LAST COMPLETION DATE   Diabetes Screening    Fasting Blood Sugar /  Glucose    One screening every 12 months if never tested or if previously tested but not diagnosed with pre-diabetes   One screening every 6 months if diagnosed with pre-diabetes Lab Results   Component Value Date    GLUCOSE 99 07/18/2006     (H) 07/14/2023        Cardiovascular Disease Screening    Lipid Panel  Cholesterol  Lipoprotein (HDL)  Triglycerides Covered every 5 years for all Medicare beneficiaries without apparent signs or symptoms of cardiovascular disease Lab Results   Component Value Date    CHOLEST 166 07/14/2023    HDL 61 (H) 07/14/2023    LDL 72 07/14/2023    TRIG 201 (H) 07/14/2023         Electrocardiogram (EKG)   Covered if needed at Welcome to Medicare, and non-screening if indicated for medical reasons 07/31/2022      Ultrasound Screening for Abdominal Aortic Aneurysm (AAA) Covered once in a lifetime for one of the following risk factors    Men who are 73-68 years old and have ever smoked    Anyone with a family history -     Colorectal Cancer Screening  Covered for ages 52-80; only need ONE of the following:    Colonoscopy   Covered every 10 years    Covered every 2 years if patient is at high risk or previous colonoscopy was abnormal 01/09/2018    No recommendations at this time    Flexible Sigmoidoscopy   Covered every 4 years -    Fecal Occult Blood Test Covered annually - Bone Density Screening    Bone density screening    Covered every 2 years after age 72 if diagnosed with risk of osteoporosis or estrogen deficiency. Covered yearly for long-term glucocorticoid medication use (Steroids) Last Dexa Scan:    XR DEXA BONE DENSITOMETRY (CPT=77080) 11/09/2020      No recommendations at this time   Pap and Pelvic    Pap   Covered every 2 years for women at normal risk;  Annually if at high risk -  No recommendations at this time    Chlamydia Annually if high risk -  No recommendations at this time   Screening Mammogram    Mammogram     Recommend annually for all female patients aged 36 and older    One baseline mammogram covered for patients aged 32-38 09/06/2023    No recommendations at this time    Immunizations    Influenza Covered once per flu season  Please get every year 10/17/2022  Influenza Vaccine(1) due on 10/01/2023    Pneumococcal Each vaccine ([id number] & [id number]) covered once after 65 Prevnar 13: 05/10/2018    [id number]: 10/21/2019     No recommendations at this time    Hepatitis B One screening covered for patients with certain risk factors   -  No recommendations at this time    Tetanus Toxoid Not covered by Medicare Part B unless medically necessary (cut with metal); may be covered with your pharmacy prescription benefits -    Tetanus, Diptheria and Pertusis TD and TDaP Not covered by Medicare Part B -  No recommendations at this time    Zoster Not covered by Medicare Part B; may be covered with your pharmacy  prescription benefits -  Zoster Vaccines(1 of 2) Never done     Annual Monitoring of Persistent Medications (ACE/ARB, digoxin diuretics, anticonvulsants)    Potassium Annually Lab Results   Component Value Date    K 3.7 07/14/2023         Creatinine   Annually Lab Results   Component Value Date    CREATSERUM 0.91 07/14/2023         BUN Annually Lab Results   Component Value Date    BUN 19 (H) 07/14/2023       Drug Serum Conc Annually No results found for: \"DIGOXIN\", \"DIG\", \"VALP\"         Chronic Obstructive Pulmonary Disease (COPD)    Spirometry Annually Spirometry date: 12/13/2017

## 2023-11-09 ENCOUNTER — LAB ENCOUNTER (OUTPATIENT)
Dept: LAB | Age: 78
End: 2023-11-09
Attending: FAMILY MEDICINE
Payer: MEDICARE

## 2023-11-09 DIAGNOSIS — E78.2 HYPERLIPIDEMIA, MIXED: ICD-10-CM

## 2023-11-09 DIAGNOSIS — I10 ESSENTIAL HYPERTENSION: ICD-10-CM

## 2023-11-09 DIAGNOSIS — R73.9 HYPERGLYCEMIA: Primary | ICD-10-CM

## 2023-11-09 DIAGNOSIS — R73.9 HYPERGLYCEMIA: ICD-10-CM

## 2023-11-09 LAB
ALBUMIN SERPL-MCNC: 3.2 G/DL (ref 3.4–5)
ALBUMIN/GLOB SERPL: 0.8 {RATIO} (ref 1–2)
ALP LIVER SERPL-CCNC: 64 U/L
ALT SERPL-CCNC: 35 U/L
ANION GAP SERPL CALC-SCNC: 8 MMOL/L (ref 0–18)
AST SERPL-CCNC: 30 U/L (ref 15–37)
BASOPHILS # BLD AUTO: 0.09 X10(3) UL (ref 0–0.2)
BASOPHILS NFR BLD AUTO: 1.2 %
BILIRUB SERPL-MCNC: 0.4 MG/DL (ref 0.1–2)
BUN BLD-MCNC: 25 MG/DL (ref 9–23)
CALCIUM BLD-MCNC: 9.4 MG/DL (ref 8.5–10.1)
CHLORIDE SERPL-SCNC: 109 MMOL/L (ref 98–112)
CHOLEST SERPL-MCNC: 180 MG/DL (ref ?–200)
CO2 SERPL-SCNC: 24 MMOL/L (ref 21–32)
CREAT BLD-MCNC: 1.33 MG/DL
EGFRCR SERPLBLD CKD-EPI 2021: 40 ML/MIN/1.73M2 (ref 60–?)
EOSINOPHIL # BLD AUTO: 0.28 X10(3) UL (ref 0–0.7)
EOSINOPHIL NFR BLD AUTO: 3.6 %
ERYTHROCYTE [DISTWIDTH] IN BLOOD BY AUTOMATED COUNT: 14 %
EST. AVERAGE GLUCOSE BLD GHB EST-MCNC: 108 MG/DL (ref 68–126)
FASTING PATIENT LIPID ANSWER: YES
FASTING STATUS PATIENT QL REPORTED: YES
GLOBULIN PLAS-MCNC: 3.9 G/DL (ref 2.8–4.4)
GLUCOSE BLD-MCNC: 127 MG/DL (ref 70–99)
HBA1C MFR BLD: 5.4 % (ref ?–5.7)
HCT VFR BLD AUTO: 43.7 %
HDLC SERPL-MCNC: 73 MG/DL (ref 40–59)
HGB BLD-MCNC: 14.3 G/DL
IMM GRANULOCYTES # BLD AUTO: 0.03 X10(3) UL (ref 0–1)
IMM GRANULOCYTES NFR BLD: 0.4 %
LDLC SERPL CALC-MCNC: 82 MG/DL (ref ?–100)
LYMPHOCYTES # BLD AUTO: 2.41 X10(3) UL (ref 1–4)
LYMPHOCYTES NFR BLD AUTO: 30.9 %
MCH RBC QN AUTO: 31 PG (ref 26–34)
MCHC RBC AUTO-ENTMCNC: 32.7 G/DL (ref 31–37)
MCV RBC AUTO: 94.6 FL
MONOCYTES # BLD AUTO: 0.67 X10(3) UL (ref 0.1–1)
MONOCYTES NFR BLD AUTO: 8.6 %
NEUTROPHILS # BLD AUTO: 4.31 X10 (3) UL (ref 1.5–7.7)
NEUTROPHILS # BLD AUTO: 4.31 X10(3) UL (ref 1.5–7.7)
NEUTROPHILS NFR BLD AUTO: 55.3 %
NONHDLC SERPL-MCNC: 107 MG/DL (ref ?–130)
OSMOLALITY SERPL CALC.SUM OF ELEC: 298 MOSM/KG (ref 275–295)
PLATELET # BLD AUTO: 252 10(3)UL (ref 150–450)
POTASSIUM SERPL-SCNC: 4.1 MMOL/L (ref 3.5–5.1)
PROT SERPL-MCNC: 7.1 G/DL (ref 6.4–8.2)
RBC # BLD AUTO: 4.62 X10(6)UL
SODIUM SERPL-SCNC: 141 MMOL/L (ref 136–145)
TRIGL SERPL-MCNC: 146 MG/DL (ref 30–149)
TSI SER-ACNC: 3.22 MIU/ML (ref 0.36–3.74)
VLDLC SERPL CALC-MCNC: 23 MG/DL (ref 0–30)
WBC # BLD AUTO: 7.8 X10(3) UL (ref 4–11)

## 2023-11-09 PROCEDURE — 80061 LIPID PANEL: CPT

## 2023-11-09 PROCEDURE — 80053 COMPREHEN METABOLIC PANEL: CPT

## 2023-11-09 PROCEDURE — 83036 HEMOGLOBIN GLYCOSYLATED A1C: CPT

## 2023-11-09 PROCEDURE — 84443 ASSAY THYROID STIM HORMONE: CPT

## 2023-11-09 PROCEDURE — 85025 COMPLETE CBC W/AUTO DIFF WBC: CPT

## 2023-11-10 ENCOUNTER — LAB ENCOUNTER (OUTPATIENT)
Dept: LAB | Age: 78
End: 2023-11-10
Attending: FAMILY MEDICINE
Payer: MEDICARE

## 2023-11-10 DIAGNOSIS — E78.2 HYPERLIPIDEMIA, MIXED: ICD-10-CM

## 2023-11-10 DIAGNOSIS — I10 ESSENTIAL HYPERTENSION: Primary | ICD-10-CM

## 2023-11-10 LAB
BILIRUB UR QL STRIP.AUTO: NEGATIVE
CLARITY UR REFRACT.AUTO: CLEAR
GLUCOSE UR STRIP.AUTO-MCNC: NORMAL MG/DL
KETONES UR STRIP.AUTO-MCNC: NEGATIVE MG/DL
LEUKOCYTE ESTERASE UR QL STRIP.AUTO: NEGATIVE
NITRITE UR QL STRIP.AUTO: NEGATIVE
PH UR STRIP.AUTO: 5 [PH] (ref 5–8)
PROT UR STRIP.AUTO-MCNC: NEGATIVE MG/DL
RBC UR QL AUTO: NEGATIVE
SP GR UR STRIP.AUTO: 1.02 (ref 1–1.03)
UROBILINOGEN UR STRIP.AUTO-MCNC: NORMAL MG/DL

## 2023-11-10 PROCEDURE — 81003 URINALYSIS AUTO W/O SCOPE: CPT

## 2023-11-14 DIAGNOSIS — F32.A DEPRESSION, UNSPECIFIED DEPRESSION TYPE: ICD-10-CM

## 2023-11-14 RX ORDER — DULOXETIN HYDROCHLORIDE 60 MG/1
60 CAPSULE, DELAYED RELEASE ORAL DAILY
Qty: 90 CAPSULE | Refills: 1 | Status: SHIPPED | OUTPATIENT
Start: 2023-11-14

## 2023-11-14 NOTE — TELEPHONE ENCOUNTER
LOV: 10/02/2023  for: MAW  Patient advised to RTC on:   6 months (around 4/2/2024). Medication Quantity Refills Start End   DULoxetine 60 MG Oral Cap DR Particles 90 capsule 1 10/2/2023    Sig:   Take 1 capsule (60 mg total) by mouth daily.

## 2023-11-28 ENCOUNTER — TELEPHONE (OUTPATIENT)
Dept: FAMILY MEDICINE CLINIC | Facility: CLINIC | Age: 78
End: 2023-11-28

## 2023-11-28 NOTE — TELEPHONE ENCOUNTER
Need pre op appt/medical clearance for uterine polyp procedure scheduled for 1-4-24 with Dr Madison Hair, need pre op appt 30 days prior to procedure, call pt back

## 2023-11-28 NOTE — TELEPHONE ENCOUNTER
Called and informed patient that she needs to notify Surgeon's office to fax us Pre-Op paperwork prior to scheduling appointment. Provided Fax number . Pt voiced understanding and agreed with plan of care.     Routing to PCP as an FYI.

## 2023-12-13 ENCOUNTER — TELEPHONE (OUTPATIENT)
Dept: FAMILY MEDICINE CLINIC | Facility: CLINIC | Age: 78
End: 2023-12-13

## 2023-12-13 NOTE — TELEPHONE ENCOUNTER
Spoke with patient. Pt denies asking about Pre-op. Informed pt she needs to verify with her Surgeon if Pre-Op is needed for surgery. If so, then Surgery office need to send us Pre-Op paperwork prior to us scheduling the appointment. Pt voiced understanding. Pt c/o 7-8 chronic bilateral knee pain due to osteoarthritis. Pt states Dr. Mclean Patches knows about her chronic pain and she is just asking for further recommendation.

## 2023-12-13 NOTE — TELEPHONE ENCOUNTER
Spoke with patient for CCM. Patient has hysteroscopy scheduled on 1/4 and ha preop testing scheduled on 12/15. She is wanting to know if she needs a preop visit? Patient also c/o bilateral knee pain due to osteoarthritis and unable to make appointment with Ortho until after her surgery. Patient wanting to know what her options are for pain management. Please advise.

## 2023-12-14 NOTE — TELEPHONE ENCOUNTER
I think patient should be seeing orthopedic doctor for evaluation of her knee problem. She already saw orthopedic previously. Please have patient to see Dr. Coni Vivas. We will wait for  request for the preop evaluation and schedule her after.   thank you

## 2023-12-14 NOTE — TELEPHONE ENCOUNTER
I called and spoke to pt. I relayed Dr. Barrie Mora message. Pt stated I do not want to see anyone at this time,\" I offered to give her the information for Dr. German Thao. Pt refused and said she is not seeing any one now. I advised pt to call back if she changes her mind.

## 2023-12-15 ENCOUNTER — EKG ENCOUNTER (OUTPATIENT)
Dept: LAB | Age: 78
End: 2023-12-15
Payer: MEDICARE

## 2023-12-15 DIAGNOSIS — F32.A DEPRESSION, UNSPECIFIED DEPRESSION TYPE: ICD-10-CM

## 2023-12-15 DIAGNOSIS — Z87.42 HISTORY OF POSTMENOPAUSAL BLEEDING: ICD-10-CM

## 2023-12-15 RX ORDER — DULOXETIN HYDROCHLORIDE 20 MG/1
20 CAPSULE, DELAYED RELEASE ORAL DAILY
Qty: 90 CAPSULE | Refills: 0 | Status: SHIPPED | OUTPATIENT
Start: 2023-12-15

## 2023-12-15 NOTE — TELEPHONE ENCOUNTER
LOV: 10/02/2023  for: LINDSEY   Patient advised to RTC on: March 2024 for medication check. Medication Quantity Refills Start End   DULoxetine 20 MG Oral Cap DR Particles 90 capsule 0 10/2/2023 --   Sig:   Take 1 capsule (20 mg total) by mouth daily.

## 2023-12-17 DIAGNOSIS — I10 ESSENTIAL HYPERTENSION: ICD-10-CM

## 2023-12-18 RX ORDER — BENAZEPRIL HYDROCHLORIDE 20 MG/1
20 TABLET ORAL DAILY
Qty: 90 TABLET | Refills: 1 | OUTPATIENT
Start: 2023-12-18

## 2023-12-18 NOTE — TELEPHONE ENCOUNTER
LOV: 10/02/2023  for: LINDSEY   Patient advised to RTC on:  6 months (around 4/2/2024)     Medication Quantity Refills Start End   Benazepril HCl 20 MG Oral Tab 90 tablet 1 10/2/2023 --   Sig:   Take 1 tablet (20 mg total) by mouth daily.

## 2023-12-19 ENCOUNTER — EKG ENCOUNTER (OUTPATIENT)
Dept: LAB | Age: 78
End: 2023-12-19
Attending: STUDENT IN AN ORGANIZED HEALTH CARE EDUCATION/TRAINING PROGRAM
Payer: MEDICARE

## 2023-12-19 DIAGNOSIS — Z87.42 HISTORY OF POSTMENOPAUSAL BLEEDING: Primary | ICD-10-CM

## 2023-12-19 LAB
ATRIAL RATE: 90 BPM
P AXIS: 52 DEGREES
P-R INTERVAL: 152 MS
Q-T INTERVAL: 350 MS
QRS DURATION: 80 MS
QTC CALCULATION (BEZET): 428 MS
R AXIS: 20 DEGREES
T AXIS: 78 DEGREES
VENTRICULAR RATE: 90 BPM

## 2023-12-19 PROCEDURE — 85027 COMPLETE CBC AUTOMATED: CPT

## 2023-12-19 PROCEDURE — 93010 ELECTROCARDIOGRAM REPORT: CPT | Performed by: INTERNAL MEDICINE

## 2023-12-19 PROCEDURE — 93005 ELECTROCARDIOGRAM TRACING: CPT

## 2024-01-03 ENCOUNTER — ANESTHESIA EVENT (OUTPATIENT)
Dept: SURGERY | Facility: HOSPITAL | Age: 79
End: 2024-01-03
Payer: MEDICARE

## 2024-01-04 ENCOUNTER — ANESTHESIA (OUTPATIENT)
Dept: SURGERY | Facility: HOSPITAL | Age: 79
End: 2024-01-04
Payer: MEDICARE

## 2024-01-04 ENCOUNTER — HOSPITAL ENCOUNTER (OUTPATIENT)
Facility: HOSPITAL | Age: 79
Setting detail: HOSPITAL OUTPATIENT SURGERY
Discharge: HOME | End: 2024-01-04
Attending: STUDENT IN AN ORGANIZED HEALTH CARE EDUCATION/TRAINING PROGRAM | Admitting: STUDENT IN AN ORGANIZED HEALTH CARE EDUCATION/TRAINING PROGRAM
Payer: MEDICARE

## 2024-01-04 VITALS
SYSTOLIC BLOOD PRESSURE: 132 MMHG | TEMPERATURE: 98 F | HEIGHT: 66 IN | BODY MASS INDEX: 38.09 KG/M2 | OXYGEN SATURATION: 97 % | DIASTOLIC BLOOD PRESSURE: 85 MMHG | HEART RATE: 82 BPM | RESPIRATION RATE: 18 BRPM | WEIGHT: 237 LBS

## 2024-01-04 DIAGNOSIS — Z87.42 HISTORY OF POSTMENOPAUSAL BLEEDING: Primary | ICD-10-CM

## 2024-01-04 PROCEDURE — 88305 TISSUE EXAM BY PATHOLOGIST: CPT | Performed by: STUDENT IN AN ORGANIZED HEALTH CARE EDUCATION/TRAINING PROGRAM

## 2024-01-04 PROCEDURE — 0UB98ZX EXCISION OF UTERUS, VIA NATURAL OR ARTIFICIAL OPENING ENDOSCOPIC, DIAGNOSTIC: ICD-10-PCS | Performed by: STUDENT IN AN ORGANIZED HEALTH CARE EDUCATION/TRAINING PROGRAM

## 2024-01-04 RX ORDER — HYDROCODONE BITARTRATE AND ACETAMINOPHEN 5; 325 MG/1; MG/1
2 TABLET ORAL ONCE AS NEEDED
Status: DISCONTINUED | OUTPATIENT
Start: 2024-01-04 | End: 2024-01-04

## 2024-01-04 RX ORDER — PHENYLEPHRINE HCL 10 MG/ML
VIAL (ML) INJECTION AS NEEDED
Status: DISCONTINUED | OUTPATIENT
Start: 2024-01-04 | End: 2024-01-04 | Stop reason: SURG

## 2024-01-04 RX ORDER — EPHEDRINE SULFATE 50 MG/ML
INJECTION INTRAVENOUS AS NEEDED
Status: DISCONTINUED | OUTPATIENT
Start: 2024-01-04 | End: 2024-01-04 | Stop reason: SURG

## 2024-01-04 RX ORDER — HYDROMORPHONE HYDROCHLORIDE 1 MG/ML
0.2 INJECTION, SOLUTION INTRAMUSCULAR; INTRAVENOUS; SUBCUTANEOUS EVERY 5 MIN PRN
Status: DISCONTINUED | OUTPATIENT
Start: 2024-01-04 | End: 2024-01-04

## 2024-01-04 RX ORDER — METOCLOPRAMIDE HYDROCHLORIDE 5 MG/ML
10 INJECTION INTRAMUSCULAR; INTRAVENOUS EVERY 8 HOURS PRN
Status: DISCONTINUED | OUTPATIENT
Start: 2024-01-04 | End: 2024-01-04

## 2024-01-04 RX ORDER — HYDROCODONE BITARTRATE AND ACETAMINOPHEN 5; 325 MG/1; MG/1
1 TABLET ORAL ONCE AS NEEDED
Status: DISCONTINUED | OUTPATIENT
Start: 2024-01-04 | End: 2024-01-04

## 2024-01-04 RX ORDER — DEXAMETHASONE SODIUM PHOSPHATE 4 MG/ML
VIAL (ML) INJECTION AS NEEDED
Status: DISCONTINUED | OUTPATIENT
Start: 2024-01-04 | End: 2024-01-04 | Stop reason: SURG

## 2024-01-04 RX ORDER — ONDANSETRON 2 MG/ML
4 INJECTION INTRAMUSCULAR; INTRAVENOUS EVERY 6 HOURS PRN
Status: DISCONTINUED | OUTPATIENT
Start: 2024-01-04 | End: 2024-01-04

## 2024-01-04 RX ORDER — ACETAMINOPHEN 500 MG
1000 TABLET ORAL ONCE AS NEEDED
Status: DISCONTINUED | OUTPATIENT
Start: 2024-01-04 | End: 2024-01-04

## 2024-01-04 RX ORDER — NALOXONE HYDROCHLORIDE 0.4 MG/ML
0.08 INJECTION, SOLUTION INTRAMUSCULAR; INTRAVENOUS; SUBCUTANEOUS AS NEEDED
Status: DISCONTINUED | OUTPATIENT
Start: 2024-01-04 | End: 2024-01-04

## 2024-01-04 RX ORDER — HYDROMORPHONE HYDROCHLORIDE 1 MG/ML
0.4 INJECTION, SOLUTION INTRAMUSCULAR; INTRAVENOUS; SUBCUTANEOUS EVERY 5 MIN PRN
Status: DISCONTINUED | OUTPATIENT
Start: 2024-01-04 | End: 2024-01-04

## 2024-01-04 RX ORDER — ONDANSETRON 2 MG/ML
INJECTION INTRAMUSCULAR; INTRAVENOUS AS NEEDED
Status: DISCONTINUED | OUTPATIENT
Start: 2024-01-04 | End: 2024-01-04 | Stop reason: SURG

## 2024-01-04 RX ORDER — SODIUM CHLORIDE, SODIUM LACTATE, POTASSIUM CHLORIDE, CALCIUM CHLORIDE 600; 310; 30; 20 MG/100ML; MG/100ML; MG/100ML; MG/100ML
INJECTION, SOLUTION INTRAVENOUS CONTINUOUS
Status: DISCONTINUED | OUTPATIENT
Start: 2024-01-04 | End: 2024-01-04

## 2024-01-04 RX ORDER — HYDROMORPHONE HYDROCHLORIDE 1 MG/ML
0.6 INJECTION, SOLUTION INTRAMUSCULAR; INTRAVENOUS; SUBCUTANEOUS EVERY 5 MIN PRN
Status: DISCONTINUED | OUTPATIENT
Start: 2024-01-04 | End: 2024-01-04

## 2024-01-04 RX ORDER — ACETAMINOPHEN 500 MG
1000 TABLET ORAL ONCE
Status: DISCONTINUED | OUTPATIENT
Start: 2024-01-04 | End: 2024-01-04 | Stop reason: HOSPADM

## 2024-01-04 RX ORDER — LIDOCAINE HYDROCHLORIDE 10 MG/ML
INJECTION, SOLUTION EPIDURAL; INFILTRATION; INTRACAUDAL; PERINEURAL AS NEEDED
Status: DISCONTINUED | OUTPATIENT
Start: 2024-01-04 | End: 2024-01-04 | Stop reason: SURG

## 2024-01-04 RX ADMIN — SODIUM CHLORIDE, SODIUM LACTATE, POTASSIUM CHLORIDE, CALCIUM CHLORIDE: 600; 310; 30; 20 INJECTION, SOLUTION INTRAVENOUS at 11:31:00

## 2024-01-04 RX ADMIN — LIDOCAINE HYDROCHLORIDE 50 MG: 10 INJECTION, SOLUTION EPIDURAL; INFILTRATION; INTRACAUDAL; PERINEURAL at 11:02:00

## 2024-01-04 RX ADMIN — ONDANSETRON 4 MG: 2 INJECTION INTRAMUSCULAR; INTRAVENOUS at 11:05:00

## 2024-01-04 RX ADMIN — SODIUM CHLORIDE, SODIUM LACTATE, POTASSIUM CHLORIDE, CALCIUM CHLORIDE: 600; 310; 30; 20 INJECTION, SOLUTION INTRAVENOUS at 10:55:00

## 2024-01-04 RX ADMIN — PHENYLEPHRINE HCL 100 MCG: 10 MG/ML VIAL (ML) INJECTION at 11:16:00

## 2024-01-04 RX ADMIN — EPHEDRINE SULFATE 10 MG: 50 INJECTION INTRAVENOUS at 11:24:00

## 2024-01-04 RX ADMIN — DEXAMETHASONE SODIUM PHOSPHATE 4 MG: 4 MG/ML VIAL (ML) INJECTION at 11:05:00

## 2024-01-04 NOTE — ANESTHESIA PREPROCEDURE EVALUATION
PRE-OP EVALUATION    Patient Name: Korina Mosquera    Admit Diagnosis: PMB    Pre-op Diagnosis: PMB    HYSTEROSCOPY, POLYPECTOMY, DILATION AND CURETTAGE    Anesthesia Procedure: HYSTEROSCOPY, POLYPECTOMY, DILATION AND CURETTAGE (Uterus)    Surgeon(s) and Role:     * Madison Hair MD - Primary    Pre-op vitals reviewed.  Temp: 97.6 °F (36.4 °C)  Pulse: 106  Resp: 20  BP: 136/75  SpO2: 96 %  Body mass index is 38.25 kg/m².    Current medications reviewed.  Hospital Medications:   [MAR Hold] acetaminophen (Tylenol Extra Strength) tab 1,000 mg  1,000 mg Oral Once    lactated ringers infusion   Intravenous Continuous       Outpatient Medications:     Medications Prior to Admission   Medication Sig Dispense Refill Last Dose    DULOXETINE 20 MG Oral Cap DR Particles TAKE 1 CAPSULE(20 MG) BY MOUTH DAILY 90 capsule 0 1/4/2024    DULoxetine 60 MG Oral Cap DR Particles Take 1 capsule (60 mg total) by mouth daily. 90 capsule 1 1/4/2024    rosuvastatin 5 MG Oral Tab Take 1 tablet (5 mg total) by mouth every evening. 90 tablet 1 1/3/2024    amLODIPine 5 MG Oral Tab Take 1 tablet (5 mg total) by mouth daily. 90 tablet 1 1/3/2024 at 0800    celecoxib 200 MG Oral Cap Take 1 capsule (200 mg total) by mouth daily. 90 capsule 1 12/26/2023    Benazepril HCl 20 MG Oral Tab Take 1 tablet (20 mg total) by mouth daily. 90 tablet 1 1/3/2024 at 1200    triamcinolone 0.1 % External Cream Apply topically 2 (two) times daily as needed. 60 g 0 1/3/2024    Metoprolol Succinate ER 25 MG Oral Tablet 24 Hr Take 1 tablet (25 mg total) by mouth daily.   1/4/2024 at 0800    Cholecalciferol (VITAMIN D) 1000 units Oral Tab Take by mouth. Daily except when she takes weekly 50,000 dose   1/3/2024    Omega-3 Fatty Acids (FISH OIL OR) Take by mouth daily.   12/26/2023       Allergies: Codeine      Anesthesia Evaluation    Patient summary reviewed.    Anesthetic Complications  (-) history of anesthetic complications          GI/Hepatic/Renal         (+) hiatal hernia                        Cardiovascular      ECG reviewed.            (+) hypertension     (+) CAD                  (-) angina              Endo/Other                                  Pulmonary               (+) shortness of breath            Neuro/Psych      (+) depression                        Chronic SOB, likely from hiatal hernia per PCP. Denies any GERD symptoms         Past Surgical History:   Procedure Laterality Date    BACK SURGERY  1970    slipped disc    CAPSULE ENDOSCOPY - INTERNAL REFERRAL  1/24/18= Normal    CHEMOTHERAPY  2014    COLON CA SCRN NOT HI RSK IND N/A 12/11/2014    Procedure: ESOPHAGOGASTRODUODENOSCOPY, COLONOSCOPY, POSSIBLE BIOPSY, POSSIBLE POLYPECTOMY 21902,89805;  Surgeon: Mikie Stock MD;  Location: [facility]    COLONOSCOPY  1/9/18= Hemorrhoids    Repeat PRN    COLONOSCOPY N/A 01/09/2018    Procedure: COLONOSCOPY;  Surgeon: Onur Levi MD;  Location:  ENDOSCOPY    LUMPECTOMY LEFT  05/2014    IDC -done at [facility]    MARIAM BIOPSY STEREO NODULE 1 SITE RIGHT (CPT=19081)  03/2015    benign    MARIAM BIOPSY STEREO NODULE 1 SITE RIGHT (CPT=19081)  02/2022    radial scars    PATIENT DOCUMENTED NOT TO HAVE EXPERIENCED ANY OF THE FOLLOWING EVENTS N/A 12/11/2014    Procedure: ESOPHAGOGASTRODUODENOSCOPY, COLONOSCOPY, POSSIBLE BIOPSY, POSSIBLE POLYPECTOMY 95244,23502;  Surgeon: Mikie Stock MD;  Location: [facility]    PATIENT WITHOUGH PREOPERATIVE ORDER FOR IV ANTIBIOTIC SURGICAL SITE INFECTION PROPHYLAXIS. N/A 12/11/2014    Procedure: ESOPHAGOGASTRODUODENOSCOPY, COLONOSCOPY, POSSIBLE BIOPSY, POSSIBLE POLYPECTOMY 03162,39154;  Surgeon: Mikie Stock MD;  Location: [facility]    RADIATION LEFT  2014    SPINE SURGERY PROCEDURE UNLISTED      UPPER GI ENDOSCOPY,BIOPSY N/A 12/11/2014    Procedure: ESOPHAGOGASTRODUODENOSCOPY, COLONOSCOPY, POSSIBLE BIOPSY, POSSIBLE POLYPECTOMY 93679,99270;   Surgeon: Mikie Stock MD;  [place] [facility]    UPPER GI ENDOSCOPY,BIOPSY  18= Hiatal hernia. SB Bx normal    UPPER GI ENDOSCOPY,EXAM       Social History     Socioeconomic History    Marital status:     Number of children: 2   Occupational History    Occupation: Retired Nurse    Tobacco Use    Smoking status: Former     Packs/day: 1.00     Years: 20.00     Additional pack years: 0.00     Total pack years: 20.00     Types: Cigarettes     Quit date: 10/10/1993     Years since quittin.2    Smokeless tobacco: Never   Vaping Use    Vaping Use: Never used   Substance and Sexual Activity    Alcohol use: No    Drug use: No   Other Topics Concern     Service No    Blood Transfusions No    Caffeine Concern No    Occupational Exposure No    Hobby Hazards No    Sleep Concern No    Stress Concern No    Weight Concern Yes    Special Diet No    Back Care Yes    Seat Belt Yes     History   Drug Use No     Available pre-op labs reviewed.  Lab Results   Component Value Date    WBC 9.0 2023    RBC 4.12 2023    HGB 12.5 2023    HCT 40.1 2023    MCV 97.3 2023    MCH 30.3 2023    MCHC 31.2 2023    RDW 13.0 2023    .0 2023     Lab Results   Component Value Date     2023    K 4.1 2023     2023    CO2 24.0 2023    BUN 25 (H) 2023    CREATSERUM 1.33 (H) 2023     (H) 2023    CA 9.4 2023            Airway      Mallampati: II  Mouth opening: 3 FB  TM distance: 4 - 6 cm  Neck ROM: full Cardiovascular      Rhythm: regular  Rate: normal     Dental             Pulmonary      Breath sounds clear to auscultation bilaterally.               Other findings              ASA: 3   Plan: general  NPO status verified and patient meets guidelines.  Patient has taken beta blockers in last 24 hours.  Post-procedure pain management plan discussed with surgeon and patient.      Plan/risks  discussed with: patient and child/children                Present on Admission:  **None**

## 2024-01-04 NOTE — OPERATIVE REPORT
[facility]    Korina Mosquera Patient Status:  Hospital Outpatient Surgery    1945 MRN [id number]   Location [facility] POST ANESTHESIA CARE UNIT Attending Madison Hair MD   Hosp Day # 0 PCP Hanna Avery MD     Date of procedure: 2024    Preoperative diagnosis:  Postmenopausal bleeding  Suspected endometrial polyp    Postoperative diagnosis:  same    Procedure:  Hysteroscopic polypectomy with Truclear, D&C    Surgeon: Dr.Darlene Hair    Anesthesia: GETA  EBL: 10  Urine output: 25  Fluids: 700  Antibiotics: none indicated  DVT ppx: SCDs  Fluid defecit: 250    Findings:  1) endometrial polyp with otherwise hysteroscopically normal proliferative endometrium    Complications: none    Specimens: endometrial curretings and endocervical curretings    Indication for procedure: This is a 77yo postmenopausal female who had postmenopausal bleeding while on tamoxifen for history of breast cancer, found to have endometrial polyp on imaging. The risks were reviewed with her, and she gave informed consent.    Procedure: The patient was taken to the operating room and placed under anesthesia. She was prepped and draped in sterile fashion in lithotomy position. Her bladder was drained. A speculum was placed into the vagina, and her cervix was grasped with a single tooth tenaculum. The uterus was sounded as above. The cervix was then dilated to a 6mm Hegar dilator.     The 6 mm 0 degree hysteroscope was inserted, with findings as noted above. The soft tissue morcellator was then used to remove endometrial polyp. The endometrium was thin and without lesions after morcellation. The hysteroscope was removed. Endocervical currettings were collected.     The tenaculum and speculum were removed. Pressure was applied to the cervix, and it was noted to be hemostatic.    She was awoken from anesthesia and taken to the recovery room in good condition. She tolerated the procedure well.

## 2024-01-04 NOTE — ANESTHESIA PROCEDURE NOTES
Airway  Date/Time: 1/4/2024 11:03 AM  Urgency: elective    Airway not difficult    General Information and Staff    Patient location during procedure: OR  Anesthesiologist: Telly Foy MD  Resident/CRNA: Gerald Franco CRNA  Performed: CRNA   Performed by: Gerald Franco CRNA  Authorized by: Telly Foy MD      Indications and Patient Condition  Indications for airway management: anesthesia  Spontaneous Ventilation: absent  Sedation level: deep  Preoxygenated: yes  Patient position: sniffing  Mask difficulty assessment: 1 - vent by mask    Final Airway Details  Final airway type: supraglottic airway      Successful airway: classic  Size 3       Number of attempts at approach: 1  Number of other approaches attempted: 0    Additional Comments  Dentition per pre op

## 2024-01-04 NOTE — ANESTHESIA POSTPROCEDURE EVALUATION
[facility]    Korina Mosquera Patient Status:  Hospital Outpatient Surgery   Age/Gender 78 year old female MRN [id number]   Location [facility] POST ANESTHESIA CARE UNIT Attending Madison Hair MD   Hosp Day # 0 PCP Hanna Avery MD       Anesthesia Post-op Note    HYSTEROSCOPY, POLYPECTOMY, DILATION AND CURETTAGE    Procedure Summary       Date: 01/04/24 Room / Location:  MAIN OR 03 / [facility] MAIN OR    Anesthesia Start: 1055 Anesthesia Stop: 1138    Procedure: HYSTEROSCOPY, POLYPECTOMY, DILATION AND CURETTAGE (Uterus) Diagnosis: (PMB)    Surgeons: Madison Hair MD Anesthesiologist: Telly Foy MD    Anesthesia Type: general ASA Status: 3            Anesthesia Type: general    Vitals Value Taken Time   /90 01/04/24 1137   Temp 97 01/04/24 1138   Pulse 87 01/04/24 1137   Resp 17 01/04/24 1137   SpO2 95 % 01/04/24 1137   Vitals shown include unfiled device data.    Patient Location: PACU    Anesthesia Type: general    Airway Patency: patent and extubated    Postop Pain Control: adequate    Mental Status: preanesthetic baseline    Nausea/Vomiting: none    Cardiopulmonary/Hydration status: stable euvolemic    Complications: no apparent anesthesia related complications    Postop vital signs: stable    Dental Exam: Unchanged from Preop    Patient to be discharged from PACU when criteria met.

## 2024-01-04 NOTE — H&P
[facility]  History & Physical    Korina Mosquera Patient Status:  Hospital Outpatient Surgery    1945 MRN [id number]   Location [facility] SURGERY Attending Madison Hair MD   Hosp Day # 0 PCP Hanna Avery MD     SUBJECTIVE:      History of Present Illness:  Korina Mosquera is a 78 year old  with a history of breast cancer (s/p lumpectomy, RT) on tamoxifen who presents for hysteroscopy, polypectomy, D&C. Was seen recently for postmenopausal bleeding and workup showed endometrial/endocervical polyp. Pap smear showed atypical glandular cells underwent colposcopy, biopsy showed glandular tissue with microglandular hyperplasia and atrophic tissue. Since last visit, has not had any bleeding until this morning. Took misoprostol yesterday as premedication.     Previous history  Did not take tamoxifen 2023 for two weeks and then had vaginal spotting, has stopped tamoxifen. Has not had spotting since.     Medications:  Medications Prior to Admission   Medication Sig Dispense Refill Last Dose    DULOXETINE 20 MG Oral Cap DR Particles TAKE 1 CAPSULE(20 MG) BY MOUTH DAILY 90 capsule 0 2024    DULoxetine 60 MG Oral Cap DR Particles Take 1 capsule (60 mg total) by mouth daily. 90 capsule 1 2024    rosuvastatin 5 MG Oral Tab Take 1 tablet (5 mg total) by mouth every evening. 90 tablet 1 1/3/2024    amLODIPine 5 MG Oral Tab Take 1 tablet (5 mg total) by mouth daily. 90 tablet 1 1/3/2024 at 0800    celecoxib 200 MG Oral Cap Take 1 capsule (200 mg total) by mouth daily. 90 capsule 1 2023    Benazepril HCl 20 MG Oral Tab Take 1 tablet (20 mg total) by mouth daily. 90 tablet 1 1/3/2024 at 1200    triamcinolone 0.1 % External Cream Apply topically 2 (two) times daily as needed. 60 g 0 1/3/2024    Metoprolol Succinate ER 25 MG Oral Tablet 24 Hr Take 1 tablet (25 mg total) by mouth daily.   2024 at 0800    Cholecalciferol (VITAMIN D) 1000 units Oral Tab Take by  mouth. Daily except when she takes weekly 50,000 dose   1/3/2024    Omega-3 Fatty Acids (FISH OIL OR) Take by mouth daily.   12/26/2023       Allergies:  Allergies   Allergen Reactions    Codeine NAUSEA ONLY        Past Medical History:  Past Medical History:   Diagnosis Date    Anxiety 09/09/2016    Blood disorder     Anemia    Breast CA (HCC) 05/2014    Breast cancer (HCC) 06/12/2014    Her-2 positive    Breast cancer (HCC) 06/12/2014    Her-2 positive    Breast cancer (HCC) 06/12/2014    Her-2 positive    Cancer (HCC)     Depression     Exposure to medical diagnostic radiation     last tx 2015    Hearing impairment     hard of hearing, no hearing aids    High blood pressure     High cholesterol     Hyperlipidemia     IBS (irritable bowel syndrome)     Osteoarthritis     Personal history of antineoplastic chemotherapy     last tx 2015    Shortness of breath     Unspecified essential hypertension     Visual impairment     Readers       Past Surgical History:  Past Surgical History:   Procedure Laterality Date    BACK SURGERY  1970    slipped disc    CAPSULE ENDOSCOPY - INTERNAL REFERRAL  1/24/18= Normal    CHEMOTHERAPY  2014    COLON CA SCRN NOT HI RSK IND N/A 12/11/2014    Procedure: ESOPHAGOGASTRODUODENOSCOPY, COLONOSCOPY, POSSIBLE BIOPSY, POSSIBLE POLYPECTOMY 83226,62544;  Surgeon: Mikie Stock MD;  [place] [facility]    COLONOSCOPY  1/9/18= Hemorrhoids    Repeat PRN    COLONOSCOPY N/A 01/09/2018    Procedure: COLONOSCOPY;  Surgeon: Onur Levi MD;  Location:  ENDOSCOPY    LUMPECTOMY LEFT  05/2014    ID[facility] -done at [facility]    MARIAM BIOPSY STEREO NODULE 1 SITE RIGHT (CPT=19081)  03/2015    benign    MARIAM BIOPSY STEREO NODULE 1 SITE RIGHT (CPT=19081)  02/2022    radial scars    PATIENT DOCUMENTED NOT TO HAVE EXPERIENCED ANY OF THE FOLLOWING EVENTS N/A 12/11/2014    Procedure: ESOPHAGOGASTRODUODENOSCOPY, COLONOSCOPY, POSSIBLE BIOPSY, POSSIBLE POLYPECTOMY 53038,66047;  Surgeon:  Mikie Stock MD;  Location: [facility]    PATIENT WITHOUGH PREOPERATIVE ORDER FOR IV ANTIBIOTIC SURGICAL SITE INFECTION PROPHYLAXIS. N/A 2014    Procedure: ESOPHAGOGASTRODUODENOSCOPY, COLONOSCOPY, POSSIBLE BIOPSY, POSSIBLE POLYPECTOMY 08131,49890;  Surgeon: Mikie Stock MD;  Location: [facility]    RADIATION LEFT      SPINE SURGERY PROCEDURE UNLISTED      UPPER GI ENDOSCOPY,BIOPSY N/A 2014    Procedure: ESOPHAGOGASTRODUODENOSCOPY, COLONOSCOPY, POSSIBLE BIOPSY, POSSIBLE POLYPECTOMY 13650,48043;  Surgeon: Mikie Stock MD;  Location: [facility]    UPPER GI ENDOSCOPY,BIOPSY  18= Hiatal hernia. SB Bx normal    UPPER GI ENDOSCOPY,EXAM         Past OB History:  OB History    Para Term  AB Living   2 2           SAB IAB Ectopic Multiple Live Births                  # Outcome Date GA Lbr Aldo/2nd Weight Sex Delivery Anes PTL Lv   2 Para            1 Para                Social History:  Social History     Tobacco Use    Smoking status: Former     Packs/day: 1.00     Years: 20.00     Additional pack years: 0.00     Total pack years: 20.00     Types: Cigarettes     Quit date: 10/10/1993     Years since quittin.2    Smokeless tobacco: Never   Substance Use Topics    Alcohol use: No        Family History:  Family History   Problem Relation Age of Onset    Heart Attack Father     Other (pneumonia) Mother     Other (unknown cause 32) Brother     Breast Cancer Sister 71    Other (COPD) Sister     Breast Cancer Paternal Aunt         80's    Breast Cancer Paternal Cousin Female         early 30's    Breast Cancer Self 68       Review of Systems:  Non-contributory    OBJECTIVE:    Temp:  [97.6 °F (36.4 °C)] 97.6 °F (36.4 °C)  Pulse:  [106] 106  Resp:  [20] 20  BP: (136)/(75) 136/75  SpO2:  [96 %] 96 %  No intake or output data in the 24 hours ending 24 1046    Physical Exam:  General: Alert, orientated x3. .  Vital Signs:  Blood  pressure 136/75, pulse 106, temperature 97.6 °F (36.4 °C), temperature source Temporal, resp. rate 20, height 5' 6\" (1.676 m), weight 236 lb 15.9 oz (107.5 kg), SpO2 96%, not currently breastfeeding.. VSS Afebrile  HEENT: Exam is unremarkable.    Lungs: nonlabored breathing  Cardiac: Regular rate   Abdomen:  Soft, non-distended, non-tender. Benign without masses or peritoneal signs.   Pelvic: deferred   Skin: Normal texture         Diagnostics:    A.  Cervix, 12:00; biopsy:  - Ectocervical tissue, negative for dysplasia     B.  Endocervix; curettage:  - Rare fragments of glandular tissue with changes resembling microglandular hyperplasia  - Separate fragments of atrophic squamous tissue with inflammation, negative for dysplasia  - Please see comment       ASSESSMENT/PLAN:    Kornia Mosquera is a 78 year old  with a history of breast cancer (s/p lumpectomy, RT) on tamoxifen who presents for hysteroscopy, polypectomy, D&C.    -discussed risks of surgery including, bleeding, infection, injury to surrounding structures, fluid overload, written consent signed    All of the findings and plan were discussed with the patient.  She notes understanding and agrees with the plan of care. She understands the risks and complications of the procedure; including but not limited to bleeding, infection, trauma to GI and  tracts.   All questions were answered.    Madison Hair MD  2024  8:59 AM

## 2024-01-04 NOTE — DISCHARGE INSTRUCTIONS
For the next two weeks:  -Nothing in the vagina (no sex, no tampons)  -Avoid vigorous exercise    Call the office for:  -Pain not relieved by pain medication  -Pus or discharge from wound  -Bleeding that soaks more than one pad per hour  -Fever >100.5

## 2024-01-08 ENCOUNTER — TELEPHONE (OUTPATIENT)
Dept: ORTHOPEDICS CLINIC | Facility: CLINIC | Age: 79
End: 2024-01-08

## 2024-01-08 ENCOUNTER — OFFICE VISIT (OUTPATIENT)
Dept: ORTHOPEDICS CLINIC | Facility: CLINIC | Age: 79
End: 2024-01-08
Payer: MEDICARE

## 2024-01-08 DIAGNOSIS — M17.0 BILATERAL PRIMARY OSTEOARTHRITIS OF KNEE: Primary | ICD-10-CM

## 2024-01-08 PROCEDURE — 1125F AMNT PAIN NOTED PAIN PRSNT: CPT | Performed by: PHYSICIAN ASSISTANT

## 2024-01-08 PROCEDURE — 99213 OFFICE O/P EST LOW 20 MIN: CPT | Performed by: PHYSICIAN ASSISTANT

## 2024-01-08 NOTE — PROGRESS NOTES
[facility] - ORTHOPEDICS  [address] [place] [address]  [phone]       Name: Korina Mosquera   MRN: [id number]  Date: 1/8/2024     REASON FOR VISIT: Follow up for bilateral severe knee osteoarthritis.     INTERVAL HISTORY:  Korina Mosquera is a 78 year old female who returns for evaluation of bilateral severe knee osteoarthritis.     She underwent Zilretta injections in August, and had significant improvement. She presents today for recheck. 6/10 pain and notes less than 50% of normal function.       ROS: ROS    PE:   There were no vitals filed for this visit.  Estimated body mass index is 38.25 kg/m² as calculated from the following:    Height as of 12/5/23: 5' 6\" (1.676 m).    Weight as of 1/4/24: 236 lb 15.9 oz (107.5 kg).    Physical Exam  Constitutional:       Appearance: Normal appearance.   HENT:      Head: Normocephalic and atraumatic.   Eyes:      Extraocular Movements: Extraocular movements intact.   Neck:      Musculoskeletal: Normal range of motion and neck supple.   Cardiovascular:      Pulses: Normal pulses.   Pulmonary:      Effort: Pulmonary effort is normal. No respiratory distress.   Abdominal:      General: There is no distension.   Skin:     General: Skin is warm.      Capillary Refill: Capillary refill takes less than 2 seconds.      Findings: No bruising.   Neurological:      General: No focal deficit present.      Mental Status: She is alert.   Psychiatric:         Mood and Affect: Mood normal.     Examination of the right and left knee demonstrates:     Skin is intact, warm and dry.   Atrophy: none    Effusion: small    Joint line tenderness: medial  Crepitation: none   Lv: Positive   Patellar mobility: normal without apprehension  J-sign: none    ROM: Extension full  Flexion 120 degrees  ACL:  Negative Lachman, Negative Pivot Shift   PCL:  Negative Posterior Drawer  Collateral Ligaments: Stable to Varus and Valgus stress at 0 and 30  degrees  Strength: mild weakness   Hip joint: normal pain-free ROM   Gait:  normal   Leg length: equal and symmetric  Alignment:  neutral     No obvious peripheral edema noted.   Distal neurovascular exam demonstrates normal perfusion, intact sensation to light touch and full strength.       Radiographic Examination/Diagnostics:    I personally viewed, independently interpreted and radiology report was reviewed.         XR KNEE, COMPLETE (4 OR MORE VIEWS), LEFT (CPT=73564)     Result Date: 5/24/2022  PROCEDURE:  XR KNEE, COMPLETE (4 OR MORE VIEWS), LEFT (CPT=73564)  TECHNIQUE:  AP, lateral, sunrise, and tunnel views were obtained  COMPARISON:  None.  INDICATIONS:  M25.562 Pain in both knees, unspecified chronicity M25.561 Pain in both knees, unspecified chronicity  PATIENT STATED HISTORY: (As transcribed by Technologist)  Patient here for ortho evaluation of bilateral knees. Patient stated bilateral knee pain with the left being worse.    FINDINGS:  BONES:  Severe joint space narrowing in all 3 compartments being most severe within the medial compartment.  There osteophytes in all 3 compartments.  There is no fracture or dislocation. SOFT TISSUES:  Negative.  No visible soft tissue swelling. EFFUSION:  None visible. OTHER:  Negative.             CONCLUSION:  Severe osteoarthritis of the left knee most pronounced in the medial compartment.   Dictated by (CST): Bao Beckett MD on 5/24/2022 at 2:38 PM     Finalized by (CST): Bao Beckett MD on 5/24/2022 at 2:39 PM        XR KNEE, COMPLETE (4 OR MORE VIEWS), RIGHT (CPT=73564)     Result Date: 5/24/2022  PROCEDURE:  XR KNEE, COMPLETE (4 OR MORE VIEWS), RIGHT (CPT=73564)  TECHNIQUE:  AP, lateral, sunrise, and tunnel views were obtained  COMPARISON:  None.  INDICATIONS:  M25.562 Pain in both knees, unspecified chronicity M25.561 Pain in both knees, unspecified chronicity  PATIENT STATED HISTORY: (As transcribed by Technologist)  Patient here for ortho evaluation of  bilateral knees. Patient stated bilateral knee pain with the left being worse.     FINDINGS:  BONES:  There is severe tricompartmental osteoarthritis predominantly with medial joint space narrowing and osteophyte formation.  There is no fracture or dislocation.  No lytic or blastic lesions. SOFT TISSUES:  Negative.  No visible soft tissue swelling. EFFUSION:  None visible. OTHER:  Negative.             CONCLUSION:  Severe tricompartmental osteoarthritis of the right knee most pronounced in the medial compartment.   Dictated by (CST): Bao Beckett MD on 5/24/2022 at 2:37 PM     Finalized by (CST): Bao Beckett MD on 5/24/2022 at 2:38 PM       IMPRESSION: Korina Mosquera is a 78 year old female who presented for follow up of right and left knee severe osteoarthritis.     PLAN:   We had a detailed discussion outlining the etiology, anatomy, pathophysiology, and natural history of the patient's findings.    We reviewed the treatment of this disease condition.  We will obtain authorization for bilateral zilretta.     FOLLOW-UP:  Right and left zilretta once authorized.             Casey Carlin [facility], PA-C Orthopedic Surgery / Sports Medicine Specialist  [facility]  [address] [place] [address].org  [email or web address]  t: [phone]  o: [phone]  f: [phone]    This note was dictated using Dragon software.  While it was briefly proofread prior to completion, some grammatical, spelling, and word choice errors due to dictation may still occur.

## 2024-01-16 NOTE — TELEPHONE ENCOUNTER
Medication received and labeled.    Please assist pt with appt for injection.  Thank you!    Medication is being sent to [facility] via interoffice mail

## 2024-01-17 NOTE — TELEPHONE ENCOUNTER
Patient is scheduled.     Future Appointments   Date Time Provider Department Center   1/24/2024  1:10 PM [email or web address]

## 2024-01-17 NOTE — TELEPHONE ENCOUNTER
Medication received at [facility] and placed in cabinet for when pt schedules appt.

## 2024-01-24 ENCOUNTER — OFFICE VISIT (OUTPATIENT)
Dept: ORTHOPEDICS CLINIC | Facility: CLINIC | Age: 79
End: 2024-01-24
Payer: MEDICARE

## 2024-01-24 DIAGNOSIS — M17.0 BILATERAL PRIMARY OSTEOARTHRITIS OF KNEE: Primary | ICD-10-CM

## 2024-01-24 PROCEDURE — 1125F AMNT PAIN NOTED PAIN PRSNT: CPT | Performed by: PHYSICIAN ASSISTANT

## 2024-01-24 PROCEDURE — 20610 DRAIN/INJ JOINT/BURSA W/O US: CPT | Performed by: PHYSICIAN ASSISTANT

## 2024-01-24 NOTE — PROCEDURES
Bilateral Knee Intra-articular Injection    Name: Korina Mosquera   MRN: [id number]  Date: 1/24/2024     Clinical Indications:   Knee Osteoarthritis with symptoms refractory to conservative measures.     After informed consent, the injection site was marked, sterilized with topical chlorhexidine antiseptic, and locally anesthetized with skin refrigerant.    The patient was situation in a comfortable position. Using sterile technique: a prepared Zilretta syringe was injected utilizing anterolateral approach with a 21 gauge needle.  A band-aid was applied.  The patient tolerated the procedure well.    Disposition:   Return to clinic on an as needed basis.             Casey Carlin [facility], PA-C Orthopedic Surgery / Sports Medicine Specialist  [facility]  [address] [place] [address].org  [email or web address]  t: [phone]  o: [phone]  f: [phone]          This note was dictated using Dragon software.  While it was briefly proofread prior to completion, some grammatical, spelling, and word choice errors due to dictation may still occur.

## 2024-02-01 ENCOUNTER — PATIENT OUTREACH (OUTPATIENT)
Dept: CASE MANAGEMENT | Age: 79
End: 2024-02-01

## 2024-03-05 ENCOUNTER — PATIENT OUTREACH (OUTPATIENT)
Dept: CASE MANAGEMENT | Age: 79
End: 2024-03-05

## 2024-03-05 DIAGNOSIS — E66.01 OBESITY, MORBID (HCC): ICD-10-CM

## 2024-03-05 DIAGNOSIS — I10 HYPERTENSION, UNSPECIFIED TYPE: ICD-10-CM

## 2024-03-05 DIAGNOSIS — F32.A DEPRESSION, UNSPECIFIED DEPRESSION TYPE: ICD-10-CM

## 2024-03-05 DIAGNOSIS — E78.2 HYPERLIPIDEMIA, MIXED: Primary | ICD-10-CM

## 2024-03-05 DIAGNOSIS — I25.10 CORONARY ARTERY DISEASE INVOLVING NATIVE CORONARY ARTERY OF NATIVE HEART, UNSPECIFIED WHETHER ANGINA PRESENT: ICD-10-CM

## 2024-03-05 DIAGNOSIS — F41.9 ANXIETY: ICD-10-CM

## 2024-03-05 NOTE — PROGRESS NOTES
Spoke to Korina for CCM.      Updates to patient care team/comments: None    Patient reported changes in medications: None    Med Adherence  Comment: Patient reports taking all medications as directed.      Health Maintenance: CCM reviewed overdue health maintenance with the patient.    Health Maintenance   Topic Date Due    Zoster Vaccines (1 of 2) Never done    COVID-19 Vaccine (7 - 2023-24 season) 09/01/2023    Annual Depression Screening  01/01/2024    Annual Physical  10/02/2024    Influenza Vaccine  Completed    DEXA Scan  Completed    Fall Risk Screening (Annual)  Completed    Pneumococcal Vaccine: 65+ Years  Completed     SDOH Assessment completed with patient during today's encounter.    Notes: Will defer physical activity assessment questions until patient's pain resolves. She is unable to be active at this time.     Social Determinants of Health     Tobacco Use: Medium Risk (1/24/2024)    Patient History     Smoking Tobacco Use: Former     Smokeless Tobacco Use: Never     Passive Exposure: Not on file   Financial Resource Strain: Low Risk  (3/5/2024)    Financial Resource Strain     Difficulty of Paying Living Expenses: Not hard at all     Med Affordability: No   Food Insecurity: No Food Insecurity (3/5/2024)    Food Insecurity     Food Insecurity: Never true   Transportation Needs: No Transportation Needs (3/5/2024)    Transportation Needs     Lack of Transportation: No   Physical Activity: Patient Unable To Answer (3/5/2024)    Exercise Vital Sign     Days of Exercise per Week: Patient unable to answer     Minutes of Exercise per Session: Patient unable to answer   Stress: No Stress Concern Present (3/5/2024)    Stress     Feeling of Stress : No   Social Connections: Socially Integrated (3/5/2024)    Social Connections     Frequency of Socialization with Friends and Family: 3   Domestic Safety: Not At Risk (3/5/2024)    Domestic Safety     Domestic Safety - Pt Reported (Most Recent Flow): 2      Domestic Safety - Signs of abuse/neglect: No   Depression: Not at risk (10/2/2023)    PHQ-2     PHQ-2 Score: 0   Housing Stability: Low Risk  (3/5/2024)    Housing Stability     Housing Instability: No     Housing Instability Emergency: Not on file   Utilities: Not At Risk (3/5/2024)    [facility] Utilities     Threatened with loss of utilities: No   Access to Medications: Low Risk  (3/5/2024)    Access to Medications     Difficulty of Paying Living Expenses: Not hard at all     Med Affordability: No         Patient updates/concerns: Patient does report new concern with R hip pain that started about 1 week ago. She denies any injury, but states that she did recently increase her level of activity. Was trying to be more active since getting knee injections and was going for walks. Patient reporting that her pain is a 7/10 today, she is not taking anything for pain relief. [facility] offered to assist patient with scheduling appointment with her PCP for evaluation-patient declined. Patient stating that she will continue to monitor and call to schedule PCP appointment for evaluation if pain persists.     Patient with osteoarthritis of both knees. She had recent visit with Ortho on 1/24 when she had bilateral zilretta injections. Patient is reporting relief from the injections She will plan to follow up with Ortho as needed.     Patient had hysteroscopy and polypectomy on 1/4 with Gynecology and reports that she tolerated the procedure well. She had her postop visit on 1/18 and report no concerns related to the procedure.     Patient stating that she is Due for Mammogram in March. Health Maintenance does not reflect that patient is due for Mammogram, but she does see outside provider. Will review patient's chart and request Mammogram order from Dr. Sanderson if appropriate.       Goals/Action Plan:    Active goal from previous outreach: Patient to continue to work on diet and increase physical activity as tolerated to achieve weight  loss.         Patient reported progress towards goals: Patient limited with physical activity at this time due to pain in R hip.                - What: Patient will call to schedule appointment with PCP if this issue persists.            - Where/When/How: N/A  Patient Reported Barriers and Concerns: N/A                   - Plan for overcoming barriers: CCM encouraged patient to call as needed with any questions or concerns.       Care Managers Interventions: SDOH Assessment completed during today's outreach. CCM offered to assist patient with scheduling appointment with PCP, patient declined. CCM to continue to provide encouragement, support and education for healthy coping and dx.        Future Appointments: No future appointments.      Next Care Manager Follow Up Date: 1 Month     Reason For Follow Up: review progress and or barriers towards patient's goals.     Time Spent This Encounter Total: 25 min medical record review, telephone communication, care plan updates where needed, education, goals, and action plan recreation/update. Provided acknowledgment and validation to patient's concerns.   Monthly Minute Total including today: 25 Minutes  Physical assessment, complete health history, and need for CCM established by Hanna Avery MD.

## 2024-03-13 DIAGNOSIS — F32.A DEPRESSION, UNSPECIFIED DEPRESSION TYPE: ICD-10-CM

## 2024-03-13 RX ORDER — DULOXETIN HYDROCHLORIDE 20 MG/1
20 CAPSULE, DELAYED RELEASE ORAL DAILY
Qty: 30 CAPSULE | Refills: 0 | Status: SHIPPED | OUTPATIENT
Start: 2024-03-13

## 2024-03-13 NOTE — TELEPHONE ENCOUNTER
LOV:  10/02/2023 for: LINDSEY   Patient advised to RTC on:  March 2024.   Nov:04/15/2024           Medication Quantity Refills Start End   DULOXETINE 20 MG Oral Cap DR Particles 90 capsule 0 12/15/2023 --   Sig:   TAKE 1 CAPSULE(20 MG) BY MOUTH DAILY

## 2024-03-20 ENCOUNTER — PATIENT OUTREACH (OUTPATIENT)
Dept: CASE MANAGEMENT | Age: 79
End: 2024-03-20

## 2024-03-20 NOTE — PROGRESS NOTES
Patient called into [facility] stating that she called Central Scheduling to schedule her Mammogram. She is told that the order is incorrect in Epic and needs to be corrected.   Upon chart review, it appears that patient is due for bilateral Mammogram. Will contact Dr. Sanderson's office for new order.     [facility] contacted [facility], requested bilateral diagnostic mammogram order to be placed and faxed to [facility] central scheduling at #[phone].    Total time: 8 Minutes  Total Monthly time: 33 Minutes

## 2024-03-25 NOTE — PROGRESS NOTES
[facility] received a call from the patient stating that she tried to schedule her Mammogram and is told that Central Scheduling still does not ave the order for bilateral diagnostic mammogram.     Per notes in Care Everywhere, the new order was faxed to Central Scheduling on 3/20.     [facility] called to Central Scheduling and spoke with Ade. Ade confirms that they did receive the order via fax and she will enter it in Epic and reach out to the patient to schedule.     Central Scheduling assisted patient as requested.     Your appointments       Date & Time Appointment Department ([place])    Apr 08, 2024  1:40 PM CDT DIAGNOSTIC MAMMOGRAM with [address] [facility]amm[facility] ([facility])    Please arrive 15 minutes prior to your appointment. If you are late to your appointment, you will be rescheduled.    If breastfeeding, pump or breastfeed one hour prior to your appointment time.    Continuous glucose monitors must be removed so the appointment should be scheduled near the normal replacement date.    If you have had a mammogram within the last 5 years at a facility other than an [facility] or [facility] facility, you will need to bring those films to your appointment otherwise you will be rescheduled.    Do NOT use perfumes, deodorant, talcum powder, lotions, or creams on your breasts or underarms. They leave a coating that may be picked up by the x-rays, thereby distorting the mammogram.    Wear a two piece outfit the day of the exam. This allows you to be more comfortable during the exam.    There are no eating or activity restrictions for this exam.    The estimated duration of this exam could take up to 2 hours if an ultrasound is required. If you have questions regarding this exam, please contact a mammography technologist at [facility] at [phone] or [facility] at [phone]..

## 2024-04-03 ENCOUNTER — TELEPHONE (OUTPATIENT)
Dept: ORTHOPEDICS CLINIC | Facility: CLINIC | Age: 79
End: 2024-04-03

## 2024-04-03 ENCOUNTER — TELEPHONE (OUTPATIENT)
Dept: FAMILY MEDICINE CLINIC | Facility: CLINIC | Age: 79
End: 2024-04-03

## 2024-04-03 NOTE — TELEPHONE ENCOUNTER
Spoke with patient for CCM.     1. Patient seeing Ortho for bilateral osteoarthritis knees. Can this dx please be added to the patients active problem list?     2. During today's encounter, patient also reported that she recently experienced an allergic reaction to OTC lidocaine patches. She reported that her skin was red and itchy when removing the patch. She is no longer using. Please add to allergy list if appropriate.

## 2024-04-05 NOTE — TELEPHONE ENCOUNTER
Patient with Osteoarthritis of the knees.     Ward is reporting that she continues to struggle with bilateral knee pain on a daily basis. Last had bilateral Zilretta injections on 1/24. She notes that she only experienced relief from the Zilretta injections for a very short amount of time. Today she is reporting pain level 8/10. She is taking extra strength tylenol PRN without relief. She is not due for repeat injections for at least the end of July. Patient is wanting to advice on how to manage her pain in the meantime. She notes that she has tried using topical lidocaine patches at home previously and found that she is allergic. Please advise.  
Spoke with patient who stated she hasn't tried ice, hasn't tried compression, tylenol doesn't work, is on celebrex and is out of the voltaran gel.  Discussed that the last XR shows she has severe osteoarthritis and the next steps are replacement surgery.  Patient state she has no interest in knee replacement surgery.  Will try some of the suggestions.                
Detail Level: Detailed
Additional Notes: 90% of this L hallux nail is onycholytic.  It is yellowed with subungual debris, too.  She says test several years ago showed no fungus.  I did a PCR swab today.  If (+) would do pulse TERBINAFINE by mouth.  I discussed all the risks with patient and she is agreeable, wants the most effective treatment.\\n\\nI told her to clip the nail back and protect it from trauma.\\n\\nPrior to starting pulse TERBINAFINE I would like to check LFTs.

## 2024-04-08 ENCOUNTER — HOSPITAL ENCOUNTER (OUTPATIENT)
Dept: MAMMOGRAPHY | Facility: HOSPITAL | Age: 79
Discharge: HOME | End: 2024-04-08
Attending: SURGERY
Payer: MEDICARE

## 2024-04-08 DIAGNOSIS — N63.10 MASS OF RIGHT BREAST, UNSPECIFIED QUADRANT: ICD-10-CM

## 2024-04-08 DIAGNOSIS — C50.411 MALIGNANT NEOPLASM OF UPPER-OUTER QUADRANT OF RIGHT FEMALE BREAST, UNSPECIFIED ESTROGEN RECEPTOR STATUS (HCC): ICD-10-CM

## 2024-04-08 PROCEDURE — 77066 DX MAMMO INCL CAD BI: CPT | Performed by: SURGERY

## 2024-04-08 PROCEDURE — 77062 BREAST TOMOSYNTHESIS BI: CPT | Performed by: SURGERY

## 2024-04-12 DIAGNOSIS — E78.2 HYPERLIPIDEMIA, MIXED: ICD-10-CM

## 2024-04-13 NOTE — TELEPHONE ENCOUNTER
LOV: 10/2/2023  for: LINDSEY  Patient advised to RTC on:  6 months (around 4/2/2024).   Nov:04/15/2024    Medication Quantity Refills Start End   rosuvastatin 5 MG Oral Tab 90 tablet 1 10/2/2023 --   Sig:   Take 1 tablet (5 mg total) by mouth every evening.

## 2024-04-15 ENCOUNTER — OFFICE VISIT (OUTPATIENT)
Dept: FAMILY MEDICINE CLINIC | Facility: CLINIC | Age: 79
End: 2024-04-15
Payer: MEDICARE

## 2024-04-15 VITALS
TEMPERATURE: 97 F | HEIGHT: 66 IN | BODY MASS INDEX: 38 KG/M2 | HEART RATE: 100 BPM | RESPIRATION RATE: 16 BRPM | SYSTOLIC BLOOD PRESSURE: 128 MMHG | DIASTOLIC BLOOD PRESSURE: 77 MMHG

## 2024-04-15 DIAGNOSIS — I10 ESSENTIAL HYPERTENSION: ICD-10-CM

## 2024-04-15 DIAGNOSIS — M25.562 CHRONIC PAIN OF LEFT KNEE: ICD-10-CM

## 2024-04-15 DIAGNOSIS — I25.10 CORONARY ARTERY DISEASE INVOLVING NATIVE CORONARY ARTERY OF NATIVE HEART, UNSPECIFIED WHETHER ANGINA PRESENT: ICD-10-CM

## 2024-04-15 DIAGNOSIS — G89.29 CHRONIC PAIN OF RIGHT KNEE: ICD-10-CM

## 2024-04-15 DIAGNOSIS — K44.9 HIATAL HERNIA: ICD-10-CM

## 2024-04-15 DIAGNOSIS — R06.09 DOE (DYSPNEA ON EXERTION): ICD-10-CM

## 2024-04-15 DIAGNOSIS — E27.8 ADRENAL NODULE (HCC): ICD-10-CM

## 2024-04-15 DIAGNOSIS — E55.9 VITAMIN D DEFICIENCY: ICD-10-CM

## 2024-04-15 DIAGNOSIS — G89.29 CHRONIC PAIN OF LEFT KNEE: ICD-10-CM

## 2024-04-15 DIAGNOSIS — E78.2 HYPERLIPIDEMIA, MIXED: Primary | ICD-10-CM

## 2024-04-15 DIAGNOSIS — J42 CHRONIC BRONCHITIS, UNSPECIFIED CHRONIC BRONCHITIS TYPE (HCC): ICD-10-CM

## 2024-04-15 DIAGNOSIS — F32.A DEPRESSION, UNSPECIFIED DEPRESSION TYPE: ICD-10-CM

## 2024-04-15 DIAGNOSIS — D50.9 IRON DEFICIENCY ANEMIA, UNSPECIFIED IRON DEFICIENCY ANEMIA TYPE: ICD-10-CM

## 2024-04-15 DIAGNOSIS — C50.911 MALIGNANT NEOPLASM OF RIGHT BREAST IN FEMALE, ESTROGEN RECEPTOR POSITIVE, UNSPECIFIED SITE OF BREAST (HCC): ICD-10-CM

## 2024-04-15 DIAGNOSIS — M25.561 CHRONIC PAIN OF RIGHT KNEE: ICD-10-CM

## 2024-04-15 DIAGNOSIS — E66.01 OBESITY, MORBID (HCC): ICD-10-CM

## 2024-04-15 DIAGNOSIS — Z17.0 MALIGNANT NEOPLASM OF RIGHT BREAST IN FEMALE, ESTROGEN RECEPTOR POSITIVE, UNSPECIFIED SITE OF BREAST (HCC): ICD-10-CM

## 2024-04-15 PROBLEM — M17.0 PRIMARY OSTEOARTHRITIS OF BOTH KNEES: Status: ACTIVE | Noted: 2024-04-15

## 2024-04-15 RX ORDER — DULOXETIN HYDROCHLORIDE 20 MG/1
20 CAPSULE, DELAYED RELEASE ORAL DAILY
Qty: 90 CAPSULE | Refills: 1 | Status: SHIPPED | OUTPATIENT
Start: 2024-04-15

## 2024-04-15 RX ORDER — DULOXETIN HYDROCHLORIDE 60 MG/1
60 CAPSULE, DELAYED RELEASE ORAL DAILY
Qty: 90 CAPSULE | Refills: 1 | Status: SHIPPED | OUTPATIENT
Start: 2024-04-15

## 2024-04-15 RX ORDER — ROSUVASTATIN CALCIUM 5 MG/1
5 TABLET, COATED ORAL EVERY EVENING
Qty: 90 TABLET | Refills: 1 | Status: SHIPPED | OUTPATIENT
Start: 2024-04-15

## 2024-04-15 RX ORDER — CELECOXIB 200 MG/1
200 CAPSULE ORAL DAILY
Qty: 90 CAPSULE | Refills: 1 | Status: SHIPPED | OUTPATIENT
Start: 2024-04-15

## 2024-04-15 RX ORDER — AMLODIPINE BESYLATE 5 MG/1
5 TABLET ORAL DAILY
Qty: 90 TABLET | Refills: 1 | Status: SHIPPED | OUTPATIENT
Start: 2024-04-15

## 2024-04-15 RX ORDER — BENAZEPRIL HYDROCHLORIDE 20 MG/1
20 TABLET ORAL DAILY
Qty: 90 TABLET | Refills: 1 | Status: SHIPPED | OUTPATIENT
Start: 2024-04-15

## 2024-04-15 NOTE — PROGRESS NOTES
Korina Mosquera is a 78 year old female.cc hypertension, anemia, depression/anxiety, breast cancer, hypercholesterolemia, obese shortness of breath, fatigue.,   HPI:   HTN - doing well with medications.No side effects.  BP is controlled at home. Takes meds regularly. Needs refills. No chest pain, No SOB, no palpitations.     Hyperlipidemia- patient is on medication is doing well continue blood work she will return for testing.      Patient was diagnosed with anxiety and depression. She feels that she is doing well on Cymbalta 80 mg daily.  No suicidal no homicidal.  Patient needs refill of the medication.    Patient had breast cancer status post radiation and chemo was seeing Dr. Casanova.  Doing well.  Right now sees Dr. Saab.    Patient has iron deficiency anemia.Mnitor levels.  Further recommendation pending test results.  Could not tolerate iron because of the diarrhea.    Morbid obesity needs to lose weight.  Low-carb diet.    Patient complains of having shortness of breath on exertion.  She had a evaluation done in 2017.  Heart evaluation came back unremarkable after the stress test which showed some abnormality.  Pulmonary function test did not show any obstruction.  Patient has distant history of smoking.  She is willing to see pulmonologist again for evaluation.  Recommended to see Dr. Max.  She never set up an appointment contact information given again.    The patient is hiatal hernia which led patient to have mild anemia.  Blood counts are stable.     Arthritis bilateral knees she will continue Celebrex. Orthopedic evaluation  was done getting some injections.  Had injection in January of this year it did not work.  She has an appointment plan to schedule for April.     Adrenal nodule -monitor.    Current Outpatient Medications   Medication Sig Dispense Refill   • rosuvastatin 5 MG Oral Tab Take 1 tablet (5 mg total) by mouth every evening. 90 tablet 1   • celecoxib 200 MG Oral Cap Take 1  capsule (200 mg total) by mouth daily. 90 capsule 1   • Benazepril HCl 20 MG Oral Tab Take 1 tablet (20 mg total) by mouth daily. 90 tablet 1   • amLODIPine 5 MG Oral Tab Take 1 tablet (5 mg total) by mouth daily. 90 tablet 1   • DULoxetine 20 MG Oral Cap DR Particles Take 1 capsule (20 mg total) by mouth daily. 90 capsule 1   • DULoxetine 60 MG Oral Cap DR Particles Take 1 capsule (60 mg total) by mouth daily. 90 capsule 1   • triamcinolone 0.1 % External Cream Apply topically 2 (two) times daily as needed. 60 g 0   • Metoprolol Succinate ER 25 MG Oral Tablet 24 Hr Take 1 tablet (25 mg total) by mouth daily.     • Cholecalciferol (VITAMIN D) 1000 units Oral Tab Take by mouth. Daily except when she takes weekly 50,000 dose     • Omega-3 Fatty Acids (FISH OIL OR) Take by mouth daily.        Past Medical History:   • Anxiety   • Blood disorder    Anemia   • Breast CA (HCC)   • Breast cancer (HCC)    Her-2 positive   • Breast cancer (HCC)    Her-2 positive   • Breast cancer (HCC)    Her-2 positive   • Cancer (HCC)   • Depression   • Exposure to medical diagnostic radiation    last 2015   • Hearing impairment    hard of hearing, no hearing aids   • High blood pressure   • High cholesterol   • Hyperlipidemia   • IBS (irritable bowel syndrome)   • Osteoarthritis   • Personal history of antineoplastic chemotherapy    last 2015   • Shortness of breath   • Unspecified essential hypertension   • Visual impairment    Readers      Social History:  Social History     Socioeconomic History   • Marital status:    • Number of children: 2   Occupational History   • Occupation: Retired Nurse    Tobacco Use   • Smoking status: Former     Current packs/day: 0.00     Average packs/day: 1 pack/day for 20.0 years (20.0 ttl pk-yrs)     Types: Cigarettes     Start date: 10/10/1973     Quit date: 10/10/1993     Years since quittin.5   • Smokeless tobacco: Never   Vaping Use   • Vaping status: Never Used   Substance and  Sexual Activity   • Alcohol use: No   • Drug use: No   Other Topics Concern   •  Service No   • Blood Transfusions No   • Caffeine Concern No   • Occupational Exposure No   • Hobby Hazards No   • Sleep Concern No   • Stress Concern No   • Weight Concern Yes   • Special Diet No   • Back Care Yes   • Seat Belt Yes   Social History Narrative    Lives alone in [place] in a townhouse. Two daughters live close by.     Social Determinants of Health     Financial Resource Strain: Low Risk  (3/5/2024)    Financial Resource Strain    • Difficulty of Paying Living Expenses: Not hard at all    • Med Affordability: No   Food Insecurity: No Food Insecurity (3/5/2024)    Food Insecurity    • Food Insecurity: Never true   Transportation Needs: No Transportation Needs (3/5/2024)    Transportation Needs    • Lack of Transportation: No   Physical Activity: Patient Unable To Answer (3/5/2024)    Exercise Vital Sign    • Days of Exercise per Week: Patient unable to answer    • Minutes of Exercise per Session: Patient unable to answer   Stress: No Stress Concern Present (3/5/2024)    Stress    • Feeling of Stress : No   Social Connections: Socially Integrated (3/5/2024)    Social Connections    • Frequency of Socialization with Friends and Family: 3   Housing Stability: Low Risk  (3/5/2024)    Housing Stability    • Housing Instability: No        REVIEW OF SYSTEMS:   GENERAL HEALTH: feels well otherwise  SKIN: denies any unusual skin lesions or rashes  HEENT no congestion no runny nose no sore throat  Neck no neck pain  RESPIRATORY: Having some shortness of breath with exertion  CARDIOVASCULAR: denies chest pain on exertion  GI: denies abdominal pain and denies heartburn  NEURO: denies headaches  Musculoskeletal bilateral knee pain  Psych stable mood,     EXAM:   /77 (BP Location: Right arm, Patient Position: Sitting, Cuff Size: large)   Pulse 100   Temp 96.6 °F (35.9 °C) (Temporal)   Resp 16   Ht 5' 6\" (1.676 m)    BMI 38.25 kg/m²   GENERAL: well developed, well nourished,in no apparent distress, obese  SKIN: no rashes,no suspicious lesions  HEENT: atraumatic, normocephalic,  NECK: supple,no adenopathy  LUNGS: clear to auscultation  CARDIO: RRR without murmur  GI: good BS's,no masses, HSM or tenderness  EXTREMITIES: no cyanosis, clubbing or edema  Musculoskeletal bilateral knee pain  Psychiatric - alert  and oriented x3, normal mood     Results for orders placed or performed during the hospital encounter of 01/04/24   Specimen to Pathology Tissue   Result Value Ref Range    Case Report       Surgical Pathology                                Case: [id number]                                   Authorizing Provider:  Madison Hair MD  Collected:           01/04/2024 11:24 AM          Ordering Location:     [facility]    Received:            01/04/2024 01:44 PM          Pathologist:           Adriano Cerna MD                                                         Specimens:   A) - Endometrial curettage                                                                          B) - Endocervical curettings                                                               Final Diagnosis:       A.  Endometrium, curettage:  -Fragments of benign endometrial polyp with cystic change, tubal metaplasia, squamous metaplasia, and hemorrhage.    B.  Endocervix, curettage:  -Scant fragments of endocervical glands with acute inflammation reactive changes, mucus, and blood.  -Negative for dysplasia or malignancy.      Embedded Images      Clinical Information       Pmb.        Gross Description       A.  Received in formalin labeled with the patient's name and \"endometrial curettage\".  It consists of 1 unoriented, polypoid piece of soft tan-white to pink, rubbery tissue measuring 3.1 x 1.5 x 0.6 cm, received on a Telfa pad.  The specimen is also received with a 2.6 x 2.5 x 0.5 cm aggregate of soft tan-pink tissue admixed with  possible mucus and blood clot, received in a surgical sock.  The possible base margin of the polypoid tissue is inked blue.  The polypoid tissue is serially sectioned.  The specimen is submitted entirely as follows:    A1: Separate aggregate of soft tan-pink tissue  A2-A3: Polypoid piece of tissue, serially section    B.  Received in formalin labeled with the patient's name and \"endocervical curettings\".  It consists of a 0.8 x 0.9 x 0.1 cm aggregate of soft tan-red tissue admixed with possible mucus and blood clot, received on a Telfa pad.  The specimen is submitted entirely in B1.  (TB)       ASSESSMENT AND PLAN:     Encounter Diagnoses   Name Primary?   • Hyperlipidemia, mixed Yes   • Chronic pain of right knee    • Chronic pain of left knee    • Essential hypertension    • Depression, unspecified depression type    • FERRELL (dyspnea on exertion)    • Chronic bronchitis, unspecified chronic bronchitis type (HCC)    • Adrenal nodule (HCC)    • Obesity, morbid (HCC)    • Malignant neoplasm of right breast in female, estrogen receptor positive, unspecified site of breast (HCC)    • Hiatal hernia    • Vitamin D deficiency    • Iron deficiency anemia, unspecified iron deficiency anemia type    • Coronary artery disease involving native coronary artery of native heart, unspecified whether angina present        Orders Placed This Encounter   Procedures   • Comp Metabolic Panel (14)   • Lipid Panel   • CBC With Differential With Platelet       Meds & Refills for this Visit:  Requested Prescriptions     Signed Prescriptions Disp Refills   • rosuvastatin 5 MG Oral Tab 90 tablet 1     Sig: Take 1 tablet (5 mg total) by mouth every evening.   • celecoxib 200 MG Oral Cap 90 capsule 1     Sig: Take 1 capsule (200 mg total) by mouth daily.   • Benazepril HCl 20 MG Oral Tab 90 tablet 1     Sig: Take 1 tablet (20 mg total) by mouth daily.   • amLODIPine 5 MG Oral Tab 90 tablet 1     Sig: Take 1 tablet (5 mg total) by mouth daily.    • DULoxetine 20 MG Oral Cap DR Particles 90 capsule 1     Sig: Take 1 capsule (20 mg total) by mouth daily.   • DULoxetine 60 MG Oral Cap DR Particles 90 capsule 1     Sig: Take 1 capsule (60 mg total) by mouth daily.   Continue current meds.   Watch diet for fats and carbs.   Stay active.    See pulmonologist Dr. Max or Dr Nathan for evaluation of your symptoms.  Contact Dr. Sanderson regarding her mammogram results.  Follow-up with oncologist as scheduled.  Do fasting blood work..    Imaging & Consults:  PULMONARY - INTERNAL    The patient indicates understanding of these issues and agrees to the plan.  The patient is asked to return in 6 mo for Medicare wellness visit.  The note was dictated using speech recognition software.  Accuracy and grammar in transcription may be subject to error.

## 2024-04-15 NOTE — PATIENT INSTRUCTIONS
Continue current meds.   Watch diet for fats and carbs.   Stay active.    See pulmonologist Dr. Max or Dr Nathan for evaluation of your symptoms.  Contact Dr. Sanderson regarding her mammogram results.  Follow-up with oncologist as scheduled.  Do fasting blood work.

## 2024-04-16 RX ORDER — ROSUVASTATIN CALCIUM 5 MG/1
5 TABLET, COATED ORAL EVERY EVENING
Qty: 90 TABLET | Refills: 1 | OUTPATIENT
Start: 2024-04-16

## 2024-04-29 ENCOUNTER — OFFICE VISIT (OUTPATIENT)
Dept: ORTHOPEDICS CLINIC | Facility: CLINIC | Age: 79
End: 2024-04-29
Payer: MEDICARE

## 2024-04-29 DIAGNOSIS — M17.0 BILATERAL PRIMARY OSTEOARTHRITIS OF KNEE: Primary | ICD-10-CM

## 2024-04-29 PROCEDURE — 99214 OFFICE O/P EST MOD 30 MIN: CPT | Performed by: PHYSICIAN ASSISTANT

## 2024-04-29 NOTE — PROGRESS NOTES
[facility] - ORTHOPEDICS  [address] [place] [address]  [phone]       Name: Korina Mosquera   MRN: [id number]  Date: 4/29/2024     REASON FOR VISIT: Follow up for bilateral knee pain.    INTERVAL HISTORY:  Korina Mosquera is a 78 year old female who returns for evaluation of bilateral knee pain consistent with osteoarthritis.  At her last visit in January 2024 she had significant removal Zilretta but recently felt that she had increased activity and overdid it.  She has had persistent sharp intermittent pain since.  She rates her pain to be a 7 out of 10 and notes 20% normal function.    ROS: ROS    PE:   There were no vitals filed for this visit.  Estimated body mass index is 38.25 kg/m² as calculated from the following:    Height as of 4/15/24: 5' 6\" (1.676 m).    Weight as of 1/4/24: 236 lb 15.9 oz (107.5 kg).    Physical Exam  Constitutional:       Appearance: Normal appearance.   HENT:      Head: Normocephalic and atraumatic.   Eyes:      Extraocular Movements: Extraocular movements intact.   Neck:      Musculoskeletal: Normal range of motion and neck supple.   Cardiovascular:      Pulses: Normal pulses.   Pulmonary:      Effort: Pulmonary effort is normal. No respiratory distress.   Abdominal:      General: There is no distension.   Skin:     General: Skin is warm.      Capillary Refill: Capillary refill takes less than 2 seconds.      Findings: No bruising.   Neurological:      General: No focal deficit present.      Mental Status: She is alert.   Psychiatric:         Mood and Affect: Mood normal.     Examination of the RIGHT AND LEFT knee demonstrates:     Skin is intact, warm and dry.   Atrophy: none    Effusion: none    Joint line tenderness: both  Crepitation: none   Lv: Positive   Patellar mobility: normal without apprehension  J-sign: none    ROM: Extension full  Flexion 90 degrees  ACL:  Negative Lachman, Negative Pivot Shift   PCL:  Negative  Posterior Drawer  Collateral Ligaments: Stable to Varus and Valgus stress at 0 and 30 degrees  Strength: mild weakness   Hip joint: normal pain-free ROM   Gait:  normal   Leg length: equal and symmetric  Alignment:  neutral     No obvious peripheral edema noted.   Distal neurovascular exam demonstrates normal perfusion, intact sensation to light touch and full strength.     Examination of the contralateral knee demonstrates:  No significant atrophy, swelling or effusion. Full range of motion. Neurovascularly intact distally.      Radiographic Examination/Diagnostics:    I personally viewed, independently interpreted and radiology report was reviewed.    MARIAM SIRENA 2D+3D DIAGNOSTIC MARIAM  BILAT (CPT=77066/62593)    Result Date: 4/8/2024  PROCEDURE:  MARIAM SIRENA 2D+3D DIAGNOSTIC MARIAM  BILAT (CPT=77066/06392)  COMPARISON:  [facility] , MG, MARIAM SIRENA 2D+3D DIAGNOSTIC MARIAM  BILAT (WEX=52974/[id number]), 2/01/2022, 2:38 PM.  MG CHEIKH, MARIAM SIRENA 2D+3D DIAGNOSTIC MARIAM  BILAT (HIQ=29540/27704), 2/23/2023, 1:19 PM.  MG CHEIKH, MARIAM SIRENA 2D+3D DIAGNOSTIC MARIAM RIGHT (ALW=03643/26726), 9/06/2023, 2:38 PM.  INDICATIONS:  C50.411 Malignant neoplasm of upper-outer quadrant of right female breast, unspecified estrogen receptor status (HCC) N63.10 Mass of right breast, unspecified *  VIEWS OBTAINED:  Diagnostic views of both breasts were obtained.  Standard 2D and additional multiplane thin sections of the breast were obtained for the purpose of Tomosynthesis evaluation.  The images were reconstructed and reviewed on the dedicated Tomosynthesis workstation.  BREAST COMPOSITION:   Scattered areas fibroglandular density.  FINDINGS:  Retroareolar asymmetry with adjacent biopsy marker is stable.  A 2nd biopsy marker in the right breast is also stable.  There are postoperative changes in biopsy markers in the left breast which are stable.  There are no new findings detected.   There are benign dystrophic calcifications in both breasts.             CONCLUSION:  Mammographic appearance is stable.  Patient had prior stereotactic biopsy in 2022 yielding radial scar.  Given interval stability patient could continue Q six-month or annual mammography depending on clinical findings and surgical consultation.  BI-RADS CATEGORY:  DIAGNOSTIC CATEGORY 2--BENIGN FINDING NO CHANGE FROM COMPARISON ASSESSMENT.   RECOMMENDATIONS:  ROUTINE MAMMOGRAM AND CLINICAL EVALUATION IN 12 MONTHS.      A letter explaining the results in lay terms has been sent to the patient.  This exam was evaluated with a computer-aided device.  This patient's information has been entered into a reminder system with a target due date for the next mammogram.   LOCATION:  [facility]   Dictated by (CST): Juan Diego MD on 4/08/2024 at 2:24 PM     Finalized by (CST): Juan Diego MD on 4/08/2024 at 2:32 PM         IMPRESSION: Korina Mosquera is a 78 year old female who presented for follow up of bilateral knee osteoarthritis.     PLAN:   We had a detailed discussion outlining the etiology, anatomy, pathophysiology, and natural history of the patient's findings.    We reviewed the treatment of this disease condition.  Fortunately, treatment is amenable to conservative treatment which we chose to optimize at today's visit.     We discussed the etiology, pathophysiology, clinical manifestations and treatment of knee osteoarthritis. We discussed treatment including, but not limited to: weight loss, activity modification, RICE modalities, NSAIDs, steroid injections, viscosupplementation, and surgical intervention.     We recommended physical therapy to aid in strengthening, range of motion, functional improvement, and return to baseline activity.  The patient had opportunity to ask questions and all questions were answered appropriately.    FOLLOW-UP:  Right and Left knee visco once authorized.             KRISTA Bear, PA-C Orthopedic Surgery / Sports Medicine Specialist  [facility]  [address] [place] [address]   [email or web address]  [email or web address]  t: [phone]  o: [phone]  f: [phone]    This note was dictated using Dragon software.  While it was briefly proofread prior to completion, some grammatical, spelling, and word choice errors due to dictation may still occur.

## 2024-05-01 ENCOUNTER — TELEPHONE (OUTPATIENT)
Dept: ORTHOPEDICS CLINIC | Facility: CLINIC | Age: 79
End: 2024-05-01

## 2024-05-15 ENCOUNTER — PATIENT OUTREACH (OUTPATIENT)
Dept: CASE MANAGEMENT | Age: 79
End: 2024-05-15

## 2024-05-16 NOTE — PROGRESS NOTES
Attempted to reach patient for CCM monthly outreach call. LMTCB    Total time: 5 Minutes  Total Monthly time: 5 Minutes  Patient's medical record reviewed, including recent OV notes and test results.    
Patient called back and LMOM.     Attempted to reach patient for CCM monthly outreach call. LMTCB    Total time: 2 Minutes  Total Monthly time: 7 Minutes      
yes

## 2024-05-20 NOTE — TELEPHONE ENCOUNTER
Received Durolane injections:     Reached out to patient to make an injection appointment with Sincer URI Carlin for Bilateral  knee injection 5/24/24. Medication sent to [facility].

## 2024-05-22 ENCOUNTER — MED REC SCAN ONLY (OUTPATIENT)
Dept: ORTHOPEDICS CLINIC | Facility: CLINIC | Age: 79
End: 2024-05-22

## 2024-05-24 ENCOUNTER — OFFICE VISIT (OUTPATIENT)
Dept: ORTHOPEDICS CLINIC | Facility: CLINIC | Age: 79
End: 2024-05-24

## 2024-05-24 DIAGNOSIS — M17.0 BILATERAL PRIMARY OSTEOARTHRITIS OF KNEE: Primary | ICD-10-CM

## 2024-05-24 NOTE — PROCEDURES
Right Knee Intra-articular Injection    Name: Korina Mosquera   MRN: [id number]  Date: 5/24/2024     Clinical Indications:   Knee Osteoarthritis with symptoms refractory to conservative measures.     After informed consent, the injection site was marked, sterilized with topical chlorhexidine antiseptic, and locally anesthetized with skin refrigerant.    The patient was situation in a comfortable position. Using sterile technique: a single Durolane 60mg/3mL (premixed syringe) was injected into the right and left  knee utilizing anterolateral approach with a 21 gauge needle.  A band-aid was applied.  The patient tolerated the procedure well.    Disposition:   Return to clinic on an as needed basis.             Casey Carlin, [facility], PA-C Orthopedic Surgery / Sports Medicine Specialist  [facility]  [address] [place] [address].org  [email or web address]  t: [phone]  o: [phone]  f: [phone]          This note was dictated using Dragon software.  While it was briefly proofread prior to completion, some grammatical, spelling, and word choice errors due to dictation may still occur.

## 2024-06-19 ENCOUNTER — LAB ENCOUNTER (OUTPATIENT)
Dept: LAB | Age: 79
End: 2024-06-19
Attending: FAMILY MEDICINE

## 2024-06-19 DIAGNOSIS — Z17.0 MALIGNANT NEOPLASM OF RIGHT BREAST IN FEMALE, ESTROGEN RECEPTOR POSITIVE, UNSPECIFIED SITE OF BREAST (HCC): ICD-10-CM

## 2024-06-19 DIAGNOSIS — N93.9 VAGINAL BLEEDING: ICD-10-CM

## 2024-06-19 DIAGNOSIS — I10 ESSENTIAL HYPERTENSION: ICD-10-CM

## 2024-06-19 DIAGNOSIS — Z86.39 HISTORY OF IRON DEFICIENCY: ICD-10-CM

## 2024-06-19 DIAGNOSIS — R73.01 ELEVATED FASTING GLUCOSE: ICD-10-CM

## 2024-06-19 DIAGNOSIS — C50.911 MALIGNANT NEOPLASM OF RIGHT BREAST IN FEMALE, ESTROGEN RECEPTOR POSITIVE, UNSPECIFIED SITE OF BREAST (HCC): ICD-10-CM

## 2024-06-19 DIAGNOSIS — E78.2 HYPERLIPIDEMIA, MIXED: ICD-10-CM

## 2024-06-19 LAB
ALBUMIN SERPL-MCNC: 3.6 G/DL (ref 3.4–5)
ALBUMIN/GLOB SERPL: 1 {RATIO} (ref 1–2)
ALP LIVER SERPL-CCNC: 78 U/L
ALT SERPL-CCNC: 25 U/L
ANION GAP SERPL CALC-SCNC: 5 MMOL/L (ref 0–18)
AST SERPL-CCNC: 20 U/L (ref 15–37)
BASOPHILS # BLD AUTO: 0.08 X10(3) UL (ref 0–0.2)
BASOPHILS NFR BLD AUTO: 1.1 %
BILIRUB SERPL-MCNC: 0.5 MG/DL (ref 0.1–2)
BUN BLD-MCNC: 19 MG/DL (ref 9–23)
CALCIUM BLD-MCNC: 9.4 MG/DL (ref 8.5–10.1)
CHLORIDE SERPL-SCNC: 112 MMOL/L (ref 98–112)
CHOLEST SERPL-MCNC: 150 MG/DL (ref ?–200)
CO2 SERPL-SCNC: 24 MMOL/L (ref 21–32)
CREAT BLD-MCNC: 1.03 MG/DL
DEPRECATED HBV CORE AB SER IA-ACNC: 21 NG/ML
EGFRCR SERPLBLD CKD-EPI 2021: 55 ML/MIN/1.73M2 (ref 60–?)
EOSINOPHIL # BLD AUTO: 0.34 X10(3) UL (ref 0–0.7)
EOSINOPHIL NFR BLD AUTO: 4.6 %
ERYTHROCYTE [DISTWIDTH] IN BLOOD BY AUTOMATED COUNT: 14.1 %
FASTING PATIENT LIPID ANSWER: YES
FASTING STATUS PATIENT QL REPORTED: YES
GLOBULIN PLAS-MCNC: 3.5 G/DL (ref 2.8–4.4)
GLUCOSE BLD-MCNC: 110 MG/DL (ref 70–99)
HCT VFR BLD AUTO: 44 %
HDLC SERPL-MCNC: 52 MG/DL (ref 40–59)
HGB BLD-MCNC: 14 G/DL
IMM GRANULOCYTES # BLD AUTO: 0.02 X10(3) UL (ref 0–1)
IMM GRANULOCYTES NFR BLD: 0.3 %
IRON SATN MFR SERPL: 16 %
IRON SERPL-MCNC: 74 UG/DL
LDLC SERPL CALC-MCNC: 72 MG/DL (ref ?–100)
LYMPHOCYTES # BLD AUTO: 1.98 X10(3) UL (ref 1–4)
LYMPHOCYTES NFR BLD AUTO: 26.8 %
MCH RBC QN AUTO: 30 PG (ref 26–34)
MCHC RBC AUTO-ENTMCNC: 31.8 G/DL (ref 31–37)
MCV RBC AUTO: 94.4 FL
MONOCYTES # BLD AUTO: 0.6 X10(3) UL (ref 0.1–1)
MONOCYTES NFR BLD AUTO: 8.1 %
NEUTROPHILS # BLD AUTO: 4.38 X10 (3) UL (ref 1.5–7.7)
NEUTROPHILS # BLD AUTO: 4.38 X10(3) UL (ref 1.5–7.7)
NEUTROPHILS NFR BLD AUTO: 59.1 %
NONHDLC SERPL-MCNC: 98 MG/DL (ref ?–130)
OSMOLALITY SERPL CALC.SUM OF ELEC: 295 MOSM/KG (ref 275–295)
PLATELET # BLD AUTO: 306 10(3)UL (ref 150–450)
POTASSIUM SERPL-SCNC: 4 MMOL/L (ref 3.5–5.1)
PROT SERPL-MCNC: 7.1 G/DL (ref 6.4–8.2)
RBC # BLD AUTO: 4.66 X10(6)UL
SODIUM SERPL-SCNC: 141 MMOL/L (ref 136–145)
TIBC SERPL-MCNC: 468 UG/DL (ref 240–450)
TRANSFERRIN SERPL-MCNC: 314 MG/DL (ref 200–360)
TRIGL SERPL-MCNC: 155 MG/DL (ref 30–149)
VLDLC SERPL CALC-MCNC: 24 MG/DL (ref 0–30)
WBC # BLD AUTO: 7.4 X10(3) UL (ref 4–11)

## 2024-06-19 PROCEDURE — 82728 ASSAY OF FERRITIN: CPT

## 2024-06-19 PROCEDURE — 83550 IRON BINDING TEST: CPT

## 2024-06-19 PROCEDURE — 80053 COMPREHEN METABOLIC PANEL: CPT

## 2024-06-19 PROCEDURE — 83036 HEMOGLOBIN GLYCOSYLATED A1C: CPT

## 2024-06-19 PROCEDURE — 83540 ASSAY OF IRON: CPT

## 2024-06-19 PROCEDURE — 85025 COMPLETE CBC W/AUTO DIFF WBC: CPT

## 2024-06-19 PROCEDURE — 80061 LIPID PANEL: CPT

## 2024-06-20 ENCOUNTER — PATIENT MESSAGE (OUTPATIENT)
Dept: FAMILY MEDICINE CLINIC | Facility: CLINIC | Age: 79
End: 2024-06-20

## 2024-06-20 DIAGNOSIS — R73.01 ELEVATED FASTING GLUCOSE: Primary | ICD-10-CM

## 2024-06-20 LAB
EST. AVERAGE GLUCOSE BLD GHB EST-MCNC: 103 MG/DL (ref 68–126)
HBA1C MFR BLD: 5.2 % (ref ?–5.7)

## 2024-06-20 NOTE — TELEPHONE ENCOUNTER
Per result note 6/19/24 [facility] patient. Triglycerides are mildly elevated. Watch diet for fats. Creatinine has improved. Continue to push fluids, at least 64oz water daily.  Glucose is elevated. Labs were collected at 12:08PM, please ask if patient was fasting. If fasting, please add on A1c. Dx elevated fasting glucose.    A1C added to existing specimen.

## 2024-06-21 ENCOUNTER — TELEPHONE (OUTPATIENT)
Dept: ORTHOPEDICS CLINIC | Facility: CLINIC | Age: 79
End: 2024-06-21

## 2024-06-21 NOTE — TELEPHONE ENCOUNTER
Spoke with patient for [facility].     Patient is reporting that she continues to experience bilateral knee pain due to osteoarthritis of the knees.    Patient had Durolane injections on 5/24, and reports minimal relief after the injections. She has completed physical therapy program and reports compliance with home exercise program. Also reporting no improvement in her pain with therapy.     Patient is very limited with physical activity as a result of the pain. Her pain level depends on the amount of activity she is doing. Today she's reporting pain level 5/10, but states that her pain can be an 8-9/10 with activity. Pain is not affecting the patient's sleep. Patient is taking Celebrex, however reports that this has not been effective in managing her pain. She states that Tylenol is also not effective in managing her pain. Patient looking for additional pain management recommendations.     Please advise.

## 2024-07-30 ENCOUNTER — PATIENT OUTREACH (OUTPATIENT)
Dept: CASE MANAGEMENT | Age: 79
End: 2024-07-30

## 2024-07-30 NOTE — TELEPHONE ENCOUNTER
Elida reached out for the patient regarding the previous TE below that she sent on 6/21. Please return call to the patient.  Ph. [phone]

## 2024-07-30 NOTE — PROGRESS NOTES
CCM sent TE to Ortho on 6/20 for further recommendations as patient is reporting that her pain is not controlled. CCM did not receive reply from Ortho regarding recommendations.     CCM placed call to [facility], spoke with Alejandra. Confirmed that TE was sent to the correct pool.   Alejandra will send message to RN to reach out to the patient with further recommendations.   CCM to alma delia patient for follow up in the next week for monthly outreach.    Total time: 5 Minutes  Total Monthly time: 5 Minutes  Patient's medical record reviewed, including recent OV notes and test results.

## 2024-07-31 ENCOUNTER — TELEPHONE (OUTPATIENT)
Dept: FAMILY MEDICINE CLINIC | Facility: CLINIC | Age: 79
End: 2024-07-31

## 2024-07-31 DIAGNOSIS — M17.0 BILATERAL PRIMARY OSTEOARTHRITIS OF KNEE: Primary | ICD-10-CM

## 2024-07-31 NOTE — TELEPHONE ENCOUNTER
Reviewed TE between ortho, dated 6/21/24:  \"From an Orthopedic standpoint there isn't much more we can do for severe bone on bone osteoarthritis and patient doesn't want to do knee replacements. We have discussed Volteron gel, icing, elevating and compression with patient. She had durolane injections in May that didn't help and is on Celebrex \"    Referral placed to pain management for further recommendations

## 2024-07-31 NOTE — TELEPHONE ENCOUNTER
Left message for Elida to call this writer to discuss patient.  Have spoken with the patient who has severe Osteoarthritis bone on bone and does not want knee replacements.

## 2024-07-31 NOTE — TELEPHONE ENCOUNTER
Spoke with Elida to let her know that from an Orthopedic standpoint there isn't much more we can do for severe bone on bone osteoarthritis and patient doesn't want to do  knee replacements.  We have discussed Volteron gel, icing, elevating and compression with patient.  She had durolane injections in May that didn't help and is on Celebrex.  Elida will reach out to the patient's PCP.

## 2024-07-31 NOTE — TELEPHONE ENCOUNTER
Patient informed informed of below and hse voiced understanding and agreed with plan.  Patient will call and schedule.

## 2024-07-31 NOTE — PROGRESS NOTES
Spoke to RUPALI Pichardo with Ortho Triage.     Unfortunately, the only other option at this time is knee replacement surgery as the patient is bone on bone. Patient has exhausted her pain management options with Ortho as she will not proceed with surgery and states that celebrex is not managing her pain.     [facility] will send TE to PCP office requesting further recommendations.     Total time: 9 Minutes  Total Monthly time: 13 Minutes

## 2024-07-31 NOTE — TELEPHONE ENCOUNTER
Patient with chronic knee pain due due bilateral Osteoarthritis.     She is following with Ortho, however feels strongly against having knee replacement.   [facility] spoke with RUPALI Pichardo with Ortho triage who states that without knee replacement, patient has exhausted all of her options at this time. Patient previously reported that celebrex has been inneffective in managing her pain.     Is there anything else we can do to help patient with pain management or provider her with pain management referral?     Please advise.

## 2024-08-14 ENCOUNTER — OFFICE VISIT (OUTPATIENT)
Dept: PAIN CLINIC | Facility: CLINIC | Age: 79
End: 2024-08-14
Payer: MEDICARE

## 2024-08-14 VITALS
WEIGHT: 236 LBS | BODY MASS INDEX: 38 KG/M2 | HEART RATE: 104 BPM | SYSTOLIC BLOOD PRESSURE: 146 MMHG | OXYGEN SATURATION: 98 % | DIASTOLIC BLOOD PRESSURE: 82 MMHG

## 2024-08-14 DIAGNOSIS — M17.0 PRIMARY OSTEOARTHRITIS OF BOTH KNEES: Primary | ICD-10-CM

## 2024-08-14 RX ORDER — LIDOCAINE HYDROCHLORIDE 10 MG/ML
10 INJECTION, SOLUTION INFILTRATION; PERINEURAL ONCE
Status: COMPLETED | OUTPATIENT
Start: 2024-08-14 | End: 2024-08-14

## 2024-08-14 RX ORDER — BUPIVACAINE HYDROCHLORIDE 5 MG/ML
9 INJECTION, SOLUTION EPIDURAL; INTRACAUDAL ONCE
Status: COMPLETED | OUTPATIENT
Start: 2024-08-14 | End: 2024-08-14

## 2024-08-14 RX ORDER — TRIAMCINOLONE ACETONIDE 40 MG/ML
40 INJECTION, SUSPENSION INTRA-ARTICULAR; INTRAMUSCULAR ONCE
Status: COMPLETED | OUTPATIENT
Start: 2024-08-14 | End: 2024-08-14

## 2024-08-14 NOTE — H&P
Name: Korina Mosquera   : 1945   DOS: 2024     Chief complaint: Knee pain    History of present illness:  Korina Mosquera is a 78 year old female with a history of hypertension, high cholesterol who presents today for evaluation of both knees.  Patient has known severe tricompartmental osteoarthritis.  The pain is described as deep and aching.  This has been ongoing and worsening over the past 3 years.  Patient rates the pain as 6 out of 10 with exacerbation to 10 out of 10.  Patient has been treated with intra-articular knee injection including corticosteroid and most recently Durolane.  Patient stated that previous corticosteroid injections were effective but Durolane injection was not.  She is somewhat hesitant to have total knee arthroplasty.  Referred here to discuss nonsurgical treatment options.    She denies any chills, fever or weakness. She denies any bladder or bowel incontinence.      Past Medical History:    Anxiety    Blood disorder    Anemia    Breast CA (HCC)    Breast cancer (HCC)    Her-2 positive    Breast cancer (HCC)    Her-2 positive    Breast cancer (HCC)    Her-2 positive    Cancer (HCC)    Depression    Exposure to medical diagnostic radiation    last tx     Hearing impairment    hard of hearing, no hearing aids    High blood pressure    High cholesterol    Hyperlipidemia    IBS (irritable bowel syndrome)    Osteoarthritis    Personal history of antineoplastic chemotherapy    last tx     Shortness of breath    Unspecified essential hypertension    Visual impairment    Readers      Current Outpatient Medications   Medication Sig Dispense Refill    rosuvastatin 5 MG Oral Tab Take 1 tablet (5 mg total) by mouth every evening. 90 tablet 1    celecoxib 200 MG Oral Cap Take 1 capsule (200 mg total) by mouth daily. 90 capsule 1    Benazepril HCl 20 MG Oral Tab Take 1 tablet (20 mg total) by mouth daily. 90 tablet 1    amLODIPine 5 MG Oral Tab Take 1 tablet (5 mg  total) by mouth daily. 90 tablet 1    DULoxetine 20 MG Oral Cap DR Particles Take 1 capsule (20 mg total) by mouth daily. 90 capsule 1    DULoxetine 60 MG Oral Cap DR Particles Take 1 capsule (60 mg total) by mouth daily. 90 capsule 1    triamcinolone 0.1 % External Cream Apply topically 2 (two) times daily as needed. 60 g 0    Metoprolol Succinate ER 25 MG Oral Tablet 24 Hr Take 1 tablet (25 mg total) by mouth daily.      Cholecalciferol (VITAMIN D) 1000 units Oral Tab Take by mouth. Daily except when she takes weekly 50,000 dose      Omega-3 Fatty Acids (FISH OIL OR) Take by mouth daily.       Past Surgical History:   Procedure Laterality Date    Back surgery  1970    slipped disc    Capsule endoscopy - internal referral  1/24/18= Normal    Chemotherapy  2014    Colon ca scrn not hi rsk ind N/A 12/11/2014    Procedure: ESOPHAGOGASTRODUODENOSCOPY, COLONOSCOPY, POSSIBLE BIOPSY, POSSIBLE POLYPECTOMY 65882,55406;  Surgeon: Mikie Stock MD;  Location: [facility]    Colonoscopy  1/9/18= Hemorrhoids    Repeat PRN    Colonoscopy N/A 01/09/2018    Procedure: COLONOSCOPY;  Surgeon: Onur Levi MD;  Location:  ENDOSCOPY    Lumpectomy left  05/2014    IDC -done at [facility]    Raymond biopsy stereo nodule 1 site right (cpt=19081)  03/2015    benign    Raymond biopsy stereo nodule 1 site right (cpt=19081)  02/2022    radial scars    Patient documented not to have experienced any of the following events N/A 12/11/2014    Procedure: ESOPHAGOGASTRODUODENOSCOPY, COLONOSCOPY, POSSIBLE BIOPSY, POSSIBLE POLYPECTOMY 35678,47888;  Surgeon: Mikie Stock MD;  Location: [facility]    Patient withough preoperative order for iv antibiotic surgical site infection prophylaxis. N/A 12/11/2014    Procedure: ESOPHAGOGASTRODUODENOSCOPY, COLONOSCOPY, POSSIBLE BIOPSY, POSSIBLE POLYPECTOMY 33750,48491;  Surgeon: Mikie Stock MD;  Location: [facility]    Radiation left  2014     Spine surgery procedure unlisted      Upper gi endoscopy,biopsy N/A 2014    Procedure: ESOPHAGOGASTRODUODENOSCOPY, COLONOSCOPY, POSSIBLE BIOPSY, POSSIBLE POLYPECTOMY 62801,34059;  Surgeon: Mikie Stock MD;  Location: [facility]    Upper gi endoscopy,biopsy  18= Hiatal hernia. SB Bx normal    Upper gi endoscopy,exam        Family History   Problem Relation Age of Onset    Heart Attack Father     Other (pneumonia) Mother     Other (unknown cause 32) Brother     Breast Cancer Sister 71    Other (COPD) Sister     Breast Cancer Paternal Aunt         80's    Breast Cancer Paternal Cousin Female         early 30's    Breast Cancer Self 68     Social History     Socioeconomic History    Marital status:     Number of children: 2   Occupational History    Occupation: Retired Nurse    Tobacco Use    Smoking status: Former     Current packs/day: 0.00     Average packs/day: 1 pack/day for 20.0 years (20.0 ttl pk-yrs)     Types: Cigarettes     Start date: 10/10/1973     Quit date: 10/10/1993     Years since quittin.8    Smokeless tobacco: Never    Tobacco comments:     Updated 24   Vaping Use    Vaping status: Never Used   Substance and Sexual Activity    Alcohol use: No    Drug use: No   Other Topics Concern     Service No    Blood Transfusions No    Caffeine Concern No    Occupational Exposure No    Hobby Hazards No    Sleep Concern No    Stress Concern No    Weight Concern Yes    Special Diet No    Back Care Yes    Seat Belt Yes   Social History Narrative    Lives alone in [place] in a townhouse. Two daughters live close by.     Social Determinants of Health     Financial Resource Strain: Low Risk  (3/5/2024)    Financial Resource Strain     Difficulty of Paying Living Expenses: Not hard at all     Med Affordability: No   Food Insecurity: No Food Insecurity (3/5/2024)    Food Insecurity     Food Insecurity: Never true   Transportation Needs: No Transportation Needs  (3/5/2024)    Transportation Needs     Lack of Transportation: No   Physical Activity: Patient Unable To Answer (3/5/2024)    Exercise Vital Sign     Days of Exercise per Week: Patient unable to answer     Minutes of Exercise per Session: Patient unable to answer   Stress: No Stress Concern Present (3/5/2024)    Stress     Feeling of Stress : No   Social Connections: Socially Integrated (3/5/2024)    Social Connections     Frequency of Socialization with Friends and Family: 3   Housing Stability: Low Risk  (3/5/2024)    Housing Stability     Housing Instability: No       Review of  other systems:  10 point ROS otherwise negative  Physical examination: Korina is a 78 year old female not in acute distress  /82 (BP Location: Left arm, Patient Position: Sitting)   Pulse 104   Wt 236 lb (107 kg)   SpO2 98%   BMI 38.09 kg/m²    The patient is awake, alert, oriented and corporative. She has a normal affect. The patient ambulates with normal gait.  HEENT: No gross lesion noted. PEERL. No icterus.  Neck and Upper Extremity: Supple. No thyromegaly or lymphadenopathy.   Motor Examination:    (R)   (L)  Deltoid:      5    5  Biceps:       5    5  Triceps:      5    5  Wrist Extension:     5    5  Wrist Flexsion:     5    5  Finger Extension:     5    5  Finger Flexsion:     5    5       Cardiovascular system: Regular rate and rhythm.  .  Respiratory system: Breath sounds equal bilaterally.    Abdomen: Soft, nontender,   Neurologic:  Cranial nerves II through XII are grossly intact.       Examination of the lower back:    Motor Examination:   (R)   (L)   Hip Abduction:   5    5   Hip Flexion:    5    5   Knee Extension:   5    5   Knee Flexion:    5    5   Ant. Tibialis:    5    5  Extensor Hallucis Longus:   5    5  Peroneals:     5    5  Gastrocsoleus:     5    5       Radiology diagnostic studies:   Knee x-ray reviewed.  Evidence of bone-on-bone appearance with severe tricompartmental  disease    Assessment:  Encounter Diagnosis   Name Primary?    Primary osteoarthritis of both knees Yes   .      Plan:     The patient is a very pleasant 78-year-old with a history of knee pain.  This is due to end-stage osteoarthritis seen on imaging.  Patient is status post multiple injections most recently Durolane.  Patient states that corticosteroid was helpful but anteriorly and was not.  Patient apprehensive to consider total knee arthroplasty at this time.  Had detailed discussion with patient regarding nonsurgical options.  Given that she had symptomatic relief following intra-articular knee injection with corticosteroid, and offered repeat injection as a first-line.  Additionally, if patient does not have relief, can consider genicular nerve block and possible radiofrequency ablation.  Please see separate dictation for intra-articular injection procedure.      Benjamín Fu MD MPH  Pain Management

## 2024-08-14 NOTE — PROCEDURES
Date of procedure: August 14, 2024    Preop diagnosis: Osteoarthritis bilateral knees    Postop: Same    Procedure: Bilateral knee injection    Surgeon: Benjamín Fu    Anesthesia: Lidocaine    EBL: 0    Indication: Patient is a 78-year-old with advanced osteoarthritis    Procedure detail: Patient is a 78-year-old with advanced osteoarthritis of both knees.  Informed consent obtained.  Timeout done.  Skin overlying both knees was prepped in the usual sterile fashion.  The overlying soft tissue along the patella border was anesthetized with 5 cc 1% lidocaine.  Subsequently a 22-gauge needle was used to enter the knee joint.  After negative aspiration for heme, 20 mg of triamcinolone with 4 cc 1% lidocaine was injected.  This was completed in both knees.  Needle withdrawn tip intact.  Patient tolerated procedure well.  No objective subjective weakness.    Benjamín Fu MD

## 2024-08-14 NOTE — PROGRESS NOTES
Location of Pain: bilateral knee    Date Pain Began: 3 years          Work Related:   No        Receiving Work Comp/Disability:   No    Numeric Rating Scale:  Pain at Present:  10                                                                                                            (No Pain) 0  to  10 (Worst Pain)                 Minimum Pain:   6  Maximum Pain  10    Distribution of Pain:    bilateral    Quality of Pain:    sharp, aching    Origin of Pain:    Repetitive motion    Aggravating Factors:    Walking    Past Treatments for Current Pain Condition:   Other Celebrex, Durolane Injections, PT, HEP, Tylenol    Prior diagnostic testing for your pain:  XRay

## 2024-08-14 NOTE — PATIENT INSTRUCTIONS
Refill policies:    Allow 2-3 business days for refills; controlled substances may take longer.  Contact your pharmacy at least 5 days prior to running out of medication and have them send an electronic request or submit request through the “request refill” option in your [employer] account.  Refills are not addressed on weekends; covering physicians do not authorize routine medications on weekends.  No narcotics or controlled substances are refilled after noon on Fridays or by on call physicians.  By law, narcotics must be electronically prescribed.  A 30 day supply with no refills is the maximum allowed.  If your prescription is due for a refill, you may be due for a follow up appointment.  To best provide you care, patients receiving routine medications need to be seen at least once a year.  Patients receiving narcotic/controlled substance medications need to be seen at least once every 3 months.  In the event that your preferred pharmacy does not have the requested medication in stock (e.g. Backordered), it is your responsibility to find another pharmacy that has the requested medication available.  We will gladly send a new prescription to that pharmacy at your request.    Scheduling Tests:    If your physician has ordered radiology tests such as MRI or CT scans, please contact Central Scheduling at [phone] right away to schedule the test.  Once scheduled, the [facility] Centralized Referral Team will work with your insurance carrier to obtain pre-certification or prior authorization.  Depending on your insurance carrier, approval may take 3-10 days.  It is highly recommended patients assure they have received an authorization before having a test performed.  If test is done without insurance authorization, patient may be responsible for the entire amount billed.      Precertification and Prior Authorizations:  If your physician has recommended that you have a procedure or additional testing performed the [facility]  Centralized Referral Team will contact your insurance carrier to obtain pre-certification or prior authorization.    You are strongly encouraged to contact your insurance carrier to verify that your procedure/test has been approved and is a COVERED benefit.  Although the [facility] Centralized Referral Team does its due diligence, the insurance carrier gives the disclaimer that \"Although the procedure is authorized, this does not guarantee payment.\"    Ultimately the patient is responsible for payment.   Thank you for your understanding in this matter.  Paperwork Completion:  If you require FMLA or disability paperwork for your recovery, please make sure to either drop it off or have it faxed to our office at [phone]. Be sure the form has your name and date of birth on it.  The form will be faxed to our Forms Department and they will complete it for you.  There is a 25$ fee for all forms that need to be filled out.  Please be aware there is a 10-14 day turnaround time.  You will need to sign a release of information (RAMAN) form if your paperwork does not come with one.  You may call the Forms Department with any questions at [phone].  Their fax number is [phone].

## 2024-08-14 NOTE — PROGRESS NOTES
Timeout completed prior to procedure @ 1883.  Participants present for timeout:  Dr. Fu, RUPALI Bhakta, and patient.

## 2024-08-14 NOTE — PROGRESS NOTES
Patient presents in office today with reported pain in bilateral knees    Current pain level reported = 10/10    Last reported dose of NA today      Narcotic Contract renewal NA    Urine Drug screen NA

## 2024-08-26 NOTE — PROGRESS NOTES
[facility] Progress Note      Patient Name:  Korina Mosquera  YOB: 1945  Medical Record Number:  [id number]    Date of visit: 8/27/2024      CHIEF COMPLAINT: L breast ca    HPI:     79 year old female previously followed by Dr. aCsanova.  L lumpectomy/SLN bx-6/14. Path-3.2 cm grade 3 IDC, ER+, HER2 negative.  LN neg. Oncotype-29.  TC x 4  RT completed 12/14  Started anastrozole 10/14 but significant joint pain.  Switched to tamoxifen in 12/14.    History of recurrent anemia due to iron deficiency.  Has received IV iron.  Colonoscopy and endoscopy done 2018 when she was admitted and showed hiatal hernia and hemorrhoids.  Presents for evaluation.    Seen a year back, complaint of vaginal bleeding.  Referred to gynecology.  Had an endometrial polyp.  No malignancy.  Now off tamoxifen.  No new complaints.    SOCIAL HISTORY:    ,  passed 2000 from colon ca. 2 daughters. 5 grandkids 11-18 years.     ROS:     Constitutional: no fever, chills fatigue,night sweats or weight loss  Neurologic: no headache, seizures, diplopia or weakness  Respiratory: no cough, SOB or hemoptysis  Cardiovascular: no chest pain, ankle swelling, FERRELL  GI: no nausea, vomiting, diarrhea or BRBPR  Musculoskeletal: no body-ache or joint pain  Dermatologic: no alopecia, rash, pruritis  : no hematuria, dysuria or frequency  Psych: no confusion or depression   Heme: no easy bruising or bleeding     ALLERGIES:    Allergies   Allergen Reactions    Codeine NAUSEA ONLY    Hm Lidocaine Patch [Lidocaine] ITCHING       MEDICATIONS:    Current Outpatient Medications   Medication Sig Dispense Refill    rosuvastatin 5 MG Oral Tab Take 1 tablet (5 mg total) by mouth every evening. 90 tablet 1    celecoxib 200 MG Oral Cap Take 1 capsule (200 mg total) by mouth daily. 90 capsule 1    Benazepril HCl 20 MG Oral Tab Take 1 tablet (20 mg total) by mouth daily. 90 tablet 1    amLODIPine 5 MG Oral Tab Take 1 tablet (5 mg total)  by mouth daily. 90 tablet 1    DULoxetine 20 MG Oral Cap DR Particles Take 1 capsule (20 mg total) by mouth daily. 90 capsule 1    DULoxetine 60 MG Oral Cap DR Particles Take 1 capsule (60 mg total) by mouth daily. 90 capsule 1    triamcinolone 0.1 % External Cream Apply topically 2 (two) times daily as needed. 60 g 0    Metoprolol Succinate ER 25 MG Oral Tablet 24 Hr Take 1 tablet (25 mg total) by mouth daily.      Cholecalciferol (VITAMIN D) 1000 units Oral Tab Take by mouth. Daily except when she takes weekly 50,000 dose      Omega-3 Fatty Acids (FISH OIL OR) Take by mouth daily.         VITALS:  Height: --  Weight: --  BSA (Calculated - sq m): --  Pulse: 94 ( 1358)  BP: 128/82 ( 135)  Temp: 96.8 °F (36 °C) ( 135)  Do Not Use - Resp Rate: --  SpO2: 93 % (1358)    PS:  ECO    PHYSICAL EXAM:    General: alert and oriented x 3, not in acute distress.  HEENT: DELL, oropharynx  clear.  Neck: supple.  No JVD /adenopathy.  CVS: S1S2, regular  Rhythm, no murmurs.   Lungs: Clear to auscultation and percussion.  Abdomen: Soft, non tender, no hepato-splenomegaly.  Extremities:  No edema.  CNS: no focal deficit  Breasts: Both soft, no masses or axillary adenopathy.    Emotional well being: Patient's emotional well being was assessed.  No issues requiring acute psychosocial intervention were identified.     LABS:     No results found for this or any previous visit (from the past 24 hour(s)).    ASSESSMENT AND PLAN:     # Vaginal bleeding: Underwent D&C 10/23.  No malignancy.      # L breast ca: stage II, s/p lumpectomy -3.2 cm grade 2 IDC, LN negative.  Tumor ER positive, HER2 negative.  Oncotype 29.  Received 4 cycles of TC and completed RT .  AI started 10/14 but unable to tolerate, switched to tamoxifen.  Discontinued  years due to vaginal bleeding.      Bilateral mammogram  ok, repeat .     ORDERS PLACED:        Procedures    [facility] SIRENA 2D+3D SCREENING BILAT (CPT=77067/17892)        Return in about 1 year (around 8/27/2025) for MD visit.    Savanah Gunn M.D.    [facility]    [address] [place], [address]    8/27/2024

## 2024-08-27 ENCOUNTER — OFFICE VISIT (OUTPATIENT)
Dept: HEMATOLOGY/ONCOLOGY | Facility: HOSPITAL | Age: 79
End: 2024-08-27
Attending: INTERNAL MEDICINE
Payer: MEDICARE

## 2024-08-27 VITALS
OXYGEN SATURATION: 93 % | TEMPERATURE: 97 F | SYSTOLIC BLOOD PRESSURE: 128 MMHG | DIASTOLIC BLOOD PRESSURE: 82 MMHG | RESPIRATION RATE: 18 BRPM | HEART RATE: 94 BPM

## 2024-08-27 DIAGNOSIS — Z17.0 MALIGNANT NEOPLASM OF RIGHT BREAST IN FEMALE, ESTROGEN RECEPTOR POSITIVE, UNSPECIFIED SITE OF BREAST (HCC): ICD-10-CM

## 2024-08-27 DIAGNOSIS — C50.911 MALIGNANT NEOPLASM OF RIGHT BREAST IN FEMALE, ESTROGEN RECEPTOR POSITIVE, UNSPECIFIED SITE OF BREAST (HCC): ICD-10-CM

## 2024-08-27 DIAGNOSIS — Z12.31 ENCOUNTER FOR MAMMOGRAM TO ESTABLISH BASELINE MAMMOGRAM: Primary | ICD-10-CM

## 2024-08-27 DIAGNOSIS — N93.9 VAGINAL BLEEDING: ICD-10-CM

## 2024-08-27 PROCEDURE — 99214 OFFICE O/P EST MOD 30 MIN: CPT | Performed by: INTERNAL MEDICINE

## 2024-08-27 NOTE — PROGRESS NOTES
Education Record    Learner:  Patient    Disease / Diagnosis: hx left breast cancer       Barriers / Limitations:  None   Comments:    Method:  Discussion   Comments:    General Topics:  Plan of care reviewed   Comments:    Outcome:  Shows understanding   Comments:   Breast imaging up to date.   No new medical issues.   Saw gyne and had biopsy 1/4/2024 benign findings.

## 2024-08-30 ENCOUNTER — PATIENT OUTREACH (OUTPATIENT)
Dept: CASE MANAGEMENT | Age: 79
End: 2024-08-30

## 2024-10-04 DIAGNOSIS — F32.A DEPRESSION, UNSPECIFIED DEPRESSION TYPE: ICD-10-CM

## 2024-10-05 DIAGNOSIS — E78.2 HYPERLIPIDEMIA, MIXED: ICD-10-CM

## 2024-10-07 RX ORDER — ROSUVASTATIN CALCIUM 5 MG/1
5 TABLET, COATED ORAL EVERY EVENING
Qty: 90 TABLET | Refills: 0 | Status: SHIPPED | OUTPATIENT
Start: 2024-10-07

## 2024-10-07 RX ORDER — DULOXETIN HYDROCHLORIDE 20 MG/1
20 CAPSULE, DELAYED RELEASE ORAL DAILY
Qty: 90 CAPSULE | Refills: 0 | Status: SHIPPED | OUTPATIENT
Start: 2024-10-07

## 2024-10-07 NOTE — TELEPHONE ENCOUNTER
LOV: 4/15/2024 for: Medication Follow-Up   Patient advised to RTC on:  6 mo for Medicare wellness visit.   NOV:12/13/2024    Medication Quantity Refills Start End   rosuvastatin 5 MG Oral Tab 90 tablet 1 4/15/2024 --   Sig:   Take 1 tablet (5 mg total) by mouth every evening.

## 2024-10-07 NOTE — TELEPHONE ENCOUNTER
LOV:  4/15/2024 for: Medication Follow-Up   Patient advised to RTC on:   6 mo for Medicare wellness visit.   NOV:12/13/2024    Medication Quantity Refills Start End   DULoxetine 20 MG Oral Cap DR Particles 90 capsule 1 4/15/2024 --   Sig:   Take 1 capsule (20 mg total) by mouth daily.

## 2024-10-16 DIAGNOSIS — R06.09 DOE (DYSPNEA ON EXERTION): Primary | ICD-10-CM

## 2024-10-16 DIAGNOSIS — I25.10 CAD (CORONARY ARTERY DISEASE): ICD-10-CM

## 2024-10-30 ENCOUNTER — TELEPHONE (OUTPATIENT)
Dept: PAIN CLINIC | Facility: CLINIC | Age: 79
End: 2024-10-30

## 2024-10-30 NOTE — TELEPHONE ENCOUNTER
Spoke to Alyse, , who states she spoke to pt today who reported knee pain. Alyse states pt is resistant to TKA and is wondering how frequently she would be able to repeat steroid knee injection. Advised that the absolute soonest Dr. Fu would consider repeating steroid knee injections would be 3 months, but he would prefer a longer duration between. Advised that if he would need to review chart to consider repeating at 3 months. Alyse states she will inform pt and have her call to schedule when appropriate. Advised that Dr. Fu would likely need to know if the injection provided relief. Alyse states pt did report about 60% relief that is waning. All questions answered to Alyse's satisfaction. No further questions.

## 2024-11-04 DIAGNOSIS — M25.562 CHRONIC PAIN OF LEFT KNEE: ICD-10-CM

## 2024-11-04 DIAGNOSIS — M25.561 CHRONIC PAIN OF RIGHT KNEE: ICD-10-CM

## 2024-11-04 DIAGNOSIS — G89.29 CHRONIC PAIN OF RIGHT KNEE: ICD-10-CM

## 2024-11-04 DIAGNOSIS — G89.29 CHRONIC PAIN OF LEFT KNEE: ICD-10-CM

## 2024-11-04 DIAGNOSIS — I10 ESSENTIAL HYPERTENSION: ICD-10-CM

## 2024-11-04 RX ORDER — AMLODIPINE BESYLATE 5 MG/1
5 TABLET ORAL DAILY
Qty: 90 TABLET | Refills: 0 | Status: SHIPPED | OUTPATIENT
Start: 2024-11-04

## 2024-11-04 RX ORDER — CELECOXIB 200 MG/1
200 CAPSULE ORAL DAILY
Qty: 90 CAPSULE | Refills: 0 | Status: SHIPPED | OUTPATIENT
Start: 2024-11-04

## 2024-11-04 NOTE — TELEPHONE ENCOUNTER
LOV: 4/15/2024  for: Medication Follow-Up   Patient advised to RTC on:  6 mo for Medicare wellness visit.   Nov:12/13/2024    Medication Quantity Refills Start End   amLODIPine 5 MG Oral Tab 90 tablet 1 4/15/2024 --   Sig:   Take 1 tablet (5 mg total) by mouth daily.

## 2024-11-14 ENCOUNTER — VIRTUAL PHONE E/M (OUTPATIENT)
Dept: PAIN CLINIC | Facility: CLINIC | Age: 79
End: 2024-11-14
Payer: MEDICARE

## 2024-11-14 DIAGNOSIS — G89.29 BILATERAL CHRONIC KNEE PAIN: Primary | ICD-10-CM

## 2024-11-14 DIAGNOSIS — M25.561 BILATERAL CHRONIC KNEE PAIN: Primary | ICD-10-CM

## 2024-11-14 DIAGNOSIS — M25.562 BILATERAL CHRONIC KNEE PAIN: Primary | ICD-10-CM

## 2024-11-14 PROCEDURE — 99213 OFFICE O/P EST LOW 20 MIN: CPT | Performed by: PHYSICIAN ASSISTANT

## 2024-11-14 NOTE — PROGRESS NOTES
HPI:   Korina Mosquera presents with complaints of bilateral knee pain.    The pain is described as severe aching that is intermittent.  The patient’s activity level has remained the same since last visit.  The pain is worst unrelated to time of day.    Changes in condition/history since last visit: pt is here today for f/u having been seen once previous on 8/14/24.  At that visit, underwent B knee injections (steroid) from which, she had good relief of pain, and was better able to tolerate ADLs.  She was still with daily pain, though overall, was 70% improved.  Over the last 1 to 2 weeks, pain has rapidly returned, left more so than right which is typical for her.  Wishes to discuss genicular nerve block, having discussed this at her initial visit.    Last procedure: Bilateral intra-articular knee injection (steroid)    date: 8/14/2024    Percentage of relief experienced from the procedure: 70%    Duration of the relief: 2-3 months    The following activities will increase the patient’s pain: walking, standing    The following activities decrease the patient’s pain: limiting activity level    Functional Assessment: Patient reports that they are able to complete all of their ADL's such as eating, bathing, using the toilet, dressing and getting up from a bed or a chair independently.    Current Medications:  Current Outpatient Medications   Medication Sig Dispense Refill    CELECOXIB 200 MG Oral Cap TAKE 1 CAPSULE(200 MG) BY MOUTH DAILY 90 capsule 0    amLODIPine 5 MG Oral Tab TAKE 1 TABLET(5 MG) BY MOUTH DAILY 90 tablet 0    DULOXETINE 20 MG Oral Cap DR Particles TAKE 1 CAPSULE(20 MG) BY MOUTH DAILY 90 capsule 0    rosuvastatin 5 MG Oral Tab TAKE 1 TABLET(5 MG) BY MOUTH EVERY EVENING 90 tablet 0    Benazepril HCl 20 MG Oral Tab Take 1 tablet (20 mg total) by mouth daily. 90 tablet 1    DULoxetine 60 MG Oral Cap DR Particles Take 1 capsule (60 mg total) by mouth daily. 90 capsule 1    triamcinolone 0.1 %  External Cream Apply topically 2 (two) times daily as needed. 60 g 0    Metoprolol Succinate ER 25 MG Oral Tablet 24 Hr Take 1 tablet (25 mg total) by mouth daily.      Cholecalciferol (VITAMIN D) 1000 units Oral Tab Take by mouth. Daily except when she takes weekly 50,000 dose      Omega-3 Fatty Acids (FISH OIL OR) Take by mouth daily.        Patient requires assistance with: No assistance required    Reviewed Patient History Dated: 8/14/24 no changes noted    Physical Exam:   There were no vitals taken for this visit.  VAS Pain Score:  0-10/10  General Appearance: Well developed, well nourished, normal build, independent body habitus, no apparent physical disabilities, well groomed    Neurological Exam: WNL-Orientation to time, place and person, normal mood & effect, normal concentration & attention span  Inspection: (tele visit)  Radiology/Lab Test Reviewed: XR knee 5/2022:  CONCLUSION:  Severe osteoarthritis of the left knee most pronounced in the medial compartment.   CONCLUSION:  Severe tricompartmental osteoarthritis of the right knee most pronounced in the medial compartment.   Lab Results   Component Value Date    WBC 7.4 06/19/2024    WBC 9.0 12/19/2023    WBC 7.8 11/09/2023   No results found for: \"HEMOGLOBIN\"  Lab Results   Component Value Date    .0 06/19/2024    .0 12/19/2023    .0 11/09/2023     Do you have any known blood/bleeding disorders?  No  Does patient currently take blood thinners?   None  Does patient currently take any antibiotics?   No  Patient educated and verbalized understanding.  Medical Decision Making:   Diagnosis:    Encounter Diagnosis   Name Primary?    Bilateral chronic knee pain Yes     Impression: Excellent though none lasting relief with bilateral intra-articular knee injections with steroid on 8/14/2024.  For over 2 months, with 70% improved, though pain has since rapidly returned to baseline.  She had discussed genicular nerve blocks at her initial  visit, and wishes to proceed.    Plan: Patient to schedule the following injection: Bilateral genicular nerve block Levels: N/A, Procedure and risks were discussed with pt. including headache, bleeding, infection and potential nerve damage.  Follow-up 2 to 3 weeks.  If she has excellent but only diagnostic improvement, consider RFA.    No orders of the defined types were placed in this encounter.      Meds & Refills for this Visit:  Requested Prescriptions      No prescriptions requested or ordered in this encounter       Imaging & Consults:  None    The patient indicates understanding of these issues and agrees to the plan.    STEFANIE Reed

## 2024-11-15 ENCOUNTER — TELEPHONE (OUTPATIENT)
Dept: PAIN CLINIC | Facility: CLINIC | Age: 79
End: 2024-11-15

## 2024-11-15 DIAGNOSIS — M25.561 CHRONIC PAIN OF BOTH KNEES: Primary | ICD-10-CM

## 2024-11-15 DIAGNOSIS — M25.562 CHRONIC PAIN OF BOTH KNEES: Primary | ICD-10-CM

## 2024-11-15 DIAGNOSIS — G89.29 CHRONIC PAIN OF BOTH KNEES: Primary | ICD-10-CM

## 2024-11-15 NOTE — TELEPHONE ENCOUNTER
Patient advised of insurance approval to proceed with injections and is agreeable to scheduling. Patient scheduled for procedure, pre-procedure instructions reviewed. Patient prefers Local sedation. Reviewed sedation instructions including No Fasting & No  Required. Patient encouraged but not required to hold Celecoxib for 24 hours and Fish Oil supplement for 5 days prior to procedure. Patient verbalized understanding of instructions, no further needs at this time.      [facility] PAIN [facility]IN[facility]  PRE-PROCEDURE INSTRUCTIONS WITHOUT SEDATION    Procedure: Bilateral Genicular NB       Appointment Date: 12/03/2024  Check-In Time: 02:00 PM    Follow-Up Date/Time: 12/17/2024 @ 01:30 PM    Prior to the procedure:  Please update us prior to the procedure if you are experiencing any symptoms of infection such as cough, fever, chills, urinary symptoms, or have recently been prescribed antibiotics, have open wounds, have recently had surgery or dental procedures.    Day of Procedure:  **Drivers will be required for patients who receive prescriptions for Valium.    NO FASTING REQUIRED  Please bring your Insurance Card, Photo ID, List of Current Medications and Referral (if applicable) to your appointment.  Please park in the [employer] and follow the signs to the [facility].  Check in at [facility] ([address] [place] [address]) outpatient registration in the [facility].  Please note-No prescriptions will be written by Pain Clinic in OR on the day of procedure. If you require a refill of medications, please contact the office 48 hours prior to your procedure.  If you have an implanted Spinal Cord or Peripheral Nerve Stimulator: Please remember to turn device off for procedure.        Medication Hold:    Number of days you need to be off for the following medications:    Aggrenox 10 days   Agrylin (Anagrelide) 10 days  Brilinta (Ticagrelor) 7 days  Imbruvica (Ibrutinib) 3 days   Enbrel  (Etanercept) 24 hours   Fragmin (Dalteparin) 24 hours   Pletal (Cilostazol) 7 days  Effient (Prasugrel) 7 days  Pradaxa 10 days  Trental 7 days  Eliquis (Apixaban) 3 days  Xarelto (Rivaroxaban) 3 days  Lovenox (Enoxaparin) 24 hours  Aspirin  Greater than 81mg but less than 325mg   5 days  325mg and greater                  7 days  Coumadin       5 days  Procedure may be cancelled if INR is elevated.   Excedrin (with aspirin) 7 days  Plavix (Clopidogrel)                            7 days    NSAIDs: 24 hours preferred      Ibuprofen (Motrin, Advil, Vicoprofen), Naproxen (Naprosyn, Aleve), Piroxcam (Feldene), Meloxicam (Mobic), Oxaprozin (Daypro), Diclofenac (Voltaren), Indomethacin (Indocin), Etodolac (Lodine), Nabumetone (Relafen), Celebrex (Celecoxib)           HERBAL SUPPLEMENTS  5 days preferred  Fish oil, krill oil, Omega-3, Vascepa, Vitamin E, Turmeric, Garlic                       Insurance Authorization:   Most insurances are now requiring a preauthorization for all procedures.  In the event that your insurance does not authorize your procedure within 48 hours of the scheduled date, your procedure will be cancelled and rescheduled to a later date.  Please contact your insurance carrier to determine what your financial responsibility will be for the procedure(s).      Cancellation/Rescheduling Appointment:   In the event you need to cancel or reschedule your appointment, you must notify the office 24 hours prior.    Post-procedure instructions:        Please schedule a follow up visit within 2 to 4 weeks after your last procedure date   Please call our office with any questions or concerns before or after your procedure at  [phone].  If you are a diabetic, please increase the frequency of your glucose monitoring after the procedure as this may cause a temporary increase in your blood sugar.  Contact your primary care physician if your blood sugar rises as you may require some medication adjustment.  It is  normal to have increased pain at injection site for up to 3-5 days after procedure, you can use heat or ice (20 minutes on 20 minutes off) for comfort.    **To hear a recorded version of these instructions, please call [phone] and follow the prompts.  **Para escuchar las instrucciones en Español, por favor de llamar el latonya [phone] opción 4.

## 2024-11-15 NOTE — TELEPHONE ENCOUNTER
Order Questions    Question Answer Comment   Anesthesia Type Local    Provider [facility] Procedure [facility]    Procedure Other (please add comment) bilateral genicular nerve block   CPT (Hit enter after each entry) NJX AA&/STRD GNCLR NRV [facility]    Medical clearance requested (will send to Pain Navigator) No    Patient has Medicare coverage? Yes

## 2024-11-22 ENCOUNTER — OFFICE VISIT (OUTPATIENT)
Age: 79
End: 2024-11-22
Payer: MEDICARE

## 2024-11-22 VITALS
DIASTOLIC BLOOD PRESSURE: 84 MMHG | HEIGHT: 66 IN | OXYGEN SATURATION: 97 % | BODY MASS INDEX: 38 KG/M2 | SYSTOLIC BLOOD PRESSURE: 130 MMHG | HEART RATE: 83 BPM | RESPIRATION RATE: 16 BRPM

## 2024-11-22 DIAGNOSIS — J42 CHRONIC BRONCHITIS, UNSPECIFIED CHRONIC BRONCHITIS TYPE (HCC): ICD-10-CM

## 2024-11-22 DIAGNOSIS — R06.02 SHORTNESS OF BREATH: Primary | ICD-10-CM

## 2024-11-22 DIAGNOSIS — R06.09 DYSPNEA ON EXERTION: ICD-10-CM

## 2024-11-22 PROCEDURE — 99204 OFFICE O/P NEW MOD 45 MIN: CPT | Performed by: INTERNAL MEDICINE

## 2024-11-22 RX ORDER — IPRATROPIUM BROMIDE AND ALBUTEROL SULFATE 2.5; .5 MG/3ML; MG/3ML
3 SOLUTION RESPIRATORY (INHALATION) 4 TIMES DAILY
Qty: 360 ML | Refills: 5 | Status: SHIPPED | OUTPATIENT
Start: 2024-11-22 | End: 2024-11-25

## 2024-11-22 NOTE — PROGRESS NOTES
[facility] General Pulmonary Consult Note    Chief Complaint:  Chief Complaint   Patient presents with    New Patient     Pt c/o dyspnea on exertion x4 years       History of Present Illness:  Korina Mosquera is a 79 year old female who presents today for evaluation of shortness of breath.  This has been ongoing for about 4 years.  Previous smoker, quit 35 years ago, smoked roughly 20ish years.  Dyspnea is mostly exertional related.  Denies any ER visits, UC visits.  Recovers realtively quick, but still has to stop to catch her breath often.  Denies any chest pains, palpitations, etc.      Past Medical History:   Past Medical History:    Anxiety    Blood disorder    Anemia    Breast CA (HCC)    Breast cancer (HCC)    Her-2 positive    Breast cancer (HCC)    Her-2 positive    Breast cancer (HCC)    Her-2 positive    Cancer (HCC)    Depression    Exposure to medical diagnostic radiation    last tx 2015    Hearing impairment    hard of hearing, no hearing aids    High blood pressure    High cholesterol    Hyperlipidemia    IBS (irritable bowel syndrome)    Osteoarthritis    Personal history of antineoplastic chemotherapy    last tx 2015    Shortness of breath    Unspecified essential hypertension    Visual impairment    Readers        Past Surgical History:   Past Surgical History:   Procedure Laterality Date    Back surgery  1970    slipped disc    Capsule endoscopy - internal referral  1/24/18= Normal    Chemotherapy  2014    Colon ca scrn not hi rsk ind N/A 12/11/2014    Procedure: ESOPHAGOGASTRODUODENOSCOPY, COLONOSCOPY, POSSIBLE BIOPSY, POSSIBLE POLYPECTOMY 09147,03439;  Surgeon: Mikie Stock MD;  [facility]: [facility]    Colonoscopy  1/9/18= Hemorrhoids    Repeat PRN    Colonoscopy N/A 01/09/2018    Procedure: COLONOSCOPY;  Surgeon: Onur Levi MD;  Location:  ENDOSCOPY    Lumpectomy left  05/2014    IDC -done at [facility] biopsy stereo nodule 1 site right (cpt=19081)   2015    benign    [facility] biopsy stereo nodule 1 site right (cpt=19081)  2022    radial scars    Patient documented not to have experienced any of the following events N/A 2014    Procedure: ESOPHAGOGASTRODUODENOSCOPY, COLONOSCOPY, POSSIBLE BIOPSY, POSSIBLE POLYPECTOMY 63757,96389;  Surgeon: Mikie Stock MD;  Location: [facility]    Patient withough preoperative order for iv antibiotic surgical site infection prophylaxis. N/A 2014    Procedure: ESOPHAGOGASTRODUODENOSCOPY, COLONOSCOPY, POSSIBLE BIOPSY, POSSIBLE POLYPECTOMY 90852,85008;  Surgeon: Mikie Stock MD;  Location: [facility]    Radiation left      Spine surgery procedure unlisted      Upper gi endoscopy,biopsy N/A 2014    Procedure: ESOPHAGOGASTRODUODENOSCOPY, COLONOSCOPY, POSSIBLE BIOPSY, POSSIBLE POLYPECTOMY 45181,41646;  Surgeon: Mikie Stock MD;  Location: [facility]    Upper gi endoscopy,biopsy  18= Hiatal hernia. SB Bx normal    Upper gi endoscopy,exam         Family Medical History:   Family History   Problem Relation Age of Onset    Heart Attack Father     Other (pneumonia) Mother     Other (unknown cause 32) Brother     Breast Cancer Sister 71    Other (COPD) Sister     Breast Cancer Paternal Aunt         80's    Breast Cancer Paternal Cousin Female         early 30's    Breast Cancer Self 70        Social History:   Social History     Socioeconomic History    Marital status:      Spouse name: Not on file    Number of children: 2    Years of education: Not on file    Highest education level: Not on file   Occupational History    Occupation: Retired Nurse    Tobacco Use    Smoking status: Former     Current packs/day: 0.00     Average packs/day: 1 pack/day for 20.0 years (20.0 ttl pk-yrs)     Types: Cigarettes     Start date: 10/10/1973     Quit date: 10/10/1993     Years since quittin.1    Smokeless tobacco: Never    Tobacco comments:     Updated  5/24/24   Vaping Use    Vaping status: Never Used   Substance and Sexual Activity    Alcohol use: No    Drug use: No    Sexual activity: Not on file   Other Topics Concern     Service No    Blood Transfusions No    Caffeine Concern No    Occupational Exposure No    Hobby Hazards No    Sleep Concern No    Stress Concern No    Weight Concern Yes    Special Diet No    Back Care Yes    Exercise Not Asked    Bike Helmet Not Asked    Seat Belt Yes    Self-Exams Not Asked   Social History Narrative    Lives alone in [place] in a townhouse. Two daughters live close by.     Social Drivers of Health     Financial Resource Strain: Low Risk  (3/5/2024)    Financial Resource Strain     Difficulty of Paying Living Expenses: Not hard at all     Med Affordability: No   Food Insecurity: No Food Insecurity (3/5/2024)    Food Insecurity     Food Insecurity: Never true   Transportation Needs: No Transportation Needs (3/5/2024)    Transportation Needs     Lack of Transportation: No   Physical Activity: Patient Unable To Answer (3/5/2024)    Exercise Vital Sign     Days of Exercise per Week: Patient unable to answer     Minutes of Exercise per Session: Patient unable to answer   Stress: No Stress Concern Present (3/5/2024)    Stress     Feeling of Stress : No   Social Connections: Socially Integrated (3/5/2024)    Social Connections     Frequency of Socialization with Friends and Family: 3   Housing Stability: Low Risk  (3/5/2024)    Housing Stability     Housing Instability: No     Housing Instability Emergency: Not on file     Crib or Bassinette: Not on file        Allergies: Codeine and Hm lidocaine patch [lidocaine]     Medications:   Current Outpatient Medications   Medication Sig Dispense Refill    ipratropium-albuterol 0.5-2.5 (3) MG/3ML Inhalation Solution Take 3 mL by nebulization 4 (four) times daily. 360 mL 5    Respiratory Therapy Supplies (FULL KIT NEBULIZER SET) Does not apply Misc 1 kit As Directed. 1 each 0     CELECOXIB 200 MG Oral Cap TAKE 1 CAPSULE(200 MG) BY MOUTH DAILY 90 capsule 0    amLODIPine 5 MG Oral Tab TAKE 1 TABLET(5 MG) BY MOUTH DAILY 90 tablet 0    DULOXETINE 20 MG Oral Cap DR Particles TAKE 1 CAPSULE(20 MG) BY MOUTH DAILY 90 capsule 0    rosuvastatin 5 MG Oral Tab TAKE 1 TABLET(5 MG) BY MOUTH EVERY EVENING 90 tablet 0    Benazepril HCl 20 MG Oral Tab Take 1 tablet (20 mg total) by mouth daily. 90 tablet 1    DULoxetine 60 MG Oral Cap DR Particles Take 1 capsule (60 mg total) by mouth daily. 90 capsule 1    triamcinolone 0.1 % External Cream Apply topically 2 (two) times daily as needed. 60 g 0    Metoprolol Succinate ER 25 MG Oral Tablet 24 Hr Take 1 tablet (25 mg total) by mouth daily.      Cholecalciferol (VITAMIN D) 1000 units Oral Tab Take by mouth. Daily except when she takes weekly 50,000 dose      Omega-3 Fatty Acids (FISH OIL OR) Take by mouth daily.         Review of Systems: Review of Systems    Physical Exam:  /84 (BP Location: Right arm, Patient Position: Sitting, Cuff Size: adult)   Pulse 83   Resp 16   Ht 5' 6\" (1.676 m)   SpO2 97%   BMI 38.09 kg/m²      Constitutional: alert, cooperative. No acute distress.  HEENT: Head NC/AT. Nares normal. Septum midline. Mucosa normal. No drainage or sinus tenderness.. Mallampati 1+  Cardio: Regular rate and rhythm. Normal S1 and S2. No murmurs.   Respiratory: Thorax symmetrical with no labored breathing. clear to auscultation bilaterally  GI: NABS. Abd soft, non-tender.  Extremities: No clubbing or cyanosis. No BLE edema.    Neurologic: A&Ox3. No gross motor deficits.  Skin: Warm, dry  Psych: Calm, cooperative. Pleasant affect.    Results:  Personally reviewed  WBC: 7.4, done on 6/19/2024.  HGB: 14, done on 6/19/2024.  PLT: 306, done on 6/19/2024.     Glucose: 110, done on 6/19/2024.  Cr: 1.03, done on 6/19/2024.  Last eGFR was 55 on 6/19/2024.  CA: 9.4, done on 6/19/2024.  Na: 141, done on 6/19/2024.  K: 4, done on 6/19/2024.  Cl: 112, done on  6/19/2024.  CO2: 24, done on 6/19/2024.  Last ALB was 3.6% done on 6/19/2024.     No results found.     Assessment/Plan:  #1. Shortness of breath  It is possible that could have a mixed restrictive/obstructive ventilatory defect  Would trial of duonebs  Check PFTs  Will defer management of hernia to general surgery      Return in about 6 weeks (around 1/3/2025).    Laurie Espinoza MD  11/22/2024

## 2024-11-25 ENCOUNTER — TELEPHONE (OUTPATIENT)
Facility: CLINIC | Age: 79
End: 2024-11-25

## 2024-11-25 RX ORDER — IPRATROPIUM BROMIDE AND ALBUTEROL SULFATE 2.5; .5 MG/3ML; MG/3ML
3 SOLUTION RESPIRATORY (INHALATION) 4 TIMES DAILY
Qty: 360 ML | Refills: 5 | Status: SHIPPED | OUTPATIENT
Start: 2024-11-25

## 2024-11-25 NOTE — PROGRESS NOTES
Received fax from [facility] requesting diagnostic code. Code updated per MD OFFICE VISIT notes and RX resent to pharmacy.

## 2024-12-03 ENCOUNTER — HOSPITAL ENCOUNTER (OUTPATIENT)
Facility: HOSPITAL | Age: 79
Setting detail: HOSPITAL OUTPATIENT SURGERY
Discharge: HOME | End: 2024-12-03
Attending: ANESTHESIOLOGY | Admitting: ANESTHESIOLOGY
Payer: MEDICARE

## 2024-12-03 ENCOUNTER — APPOINTMENT (OUTPATIENT)
Dept: GENERAL RADIOLOGY | Facility: HOSPITAL | Age: 79
End: 2024-12-03
Attending: ANESTHESIOLOGY
Payer: MEDICARE

## 2024-12-03 VITALS
DIASTOLIC BLOOD PRESSURE: 92 MMHG | HEART RATE: 80 BPM | TEMPERATURE: 98 F | SYSTOLIC BLOOD PRESSURE: 140 MMHG | OXYGEN SATURATION: 96 % | RESPIRATION RATE: 18 BRPM

## 2024-12-03 PROCEDURE — 3E0T33Z INTRODUCTION OF ANTI-INFLAMMATORY INTO PERIPHERAL NERVES AND PLEXI, PERCUTANEOUS APPROACH: ICD-10-PCS | Performed by: ANESTHESIOLOGY

## 2024-12-03 PROCEDURE — 64450 NJX AA&/STRD OTHER PN/BRANCH: CPT | Performed by: ANESTHESIOLOGY

## 2024-12-03 PROCEDURE — 3E0T3BZ INTRODUCTION OF ANESTHETIC AGENT INTO PERIPHERAL NERVES AND PLEXI, PERCUTANEOUS APPROACH: ICD-10-PCS | Performed by: ANESTHESIOLOGY

## 2024-12-03 RX ORDER — BUPIVACAINE HYDROCHLORIDE 5 MG/ML
INJECTION, SOLUTION EPIDURAL; INTRACAUDAL
Status: DISCONTINUED | OUTPATIENT
Start: 2024-12-03 | End: 2024-12-03

## 2024-12-03 RX ORDER — LIDOCAINE HYDROCHLORIDE 10 MG/ML
INJECTION, SOLUTION EPIDURAL; INFILTRATION; INTRACAUDAL; PERINEURAL
Status: DISCONTINUED | OUTPATIENT
Start: 2024-12-03 | End: 2024-12-03

## 2024-12-03 RX ORDER — NALOXONE HYDROCHLORIDE 0.4 MG/ML
0.08 INJECTION, SOLUTION INTRAMUSCULAR; INTRAVENOUS; SUBCUTANEOUS AS NEEDED
Status: DISCONTINUED | OUTPATIENT
Start: 2024-12-03 | End: 2024-12-03

## 2024-12-03 RX ORDER — METHYLPREDNISOLONE ACETATE 40 MG/ML
INJECTION, SUSPENSION INTRA-ARTICULAR; INTRALESIONAL; INTRAMUSCULAR; SOFT TISSUE
Status: DISCONTINUED | OUTPATIENT
Start: 2024-12-03 | End: 2024-12-03

## 2024-12-03 NOTE — OPERATIVE REPORT
[facility]  Operative Report  12/3/2024     Korina Mosquera Patient Status:  Hospital Outpatient Surgery    1945 MRN [id number]   Location [facility] PAIN [facility] Attending Benjamín Fu MD   Hosp Day # 0 PCP Hanna Avery MD     Indication: Korina is a 79 year old female knee pain    Preoperative Diagnosis:  Chronic pain of both knees [M25.561, M25.562, G89.29]    Postoperative Diagnosis: Same as above.    Procedure performed: bilateral GENICULAR NERVE BLOCK with local    Anesthesia: Local      EBL: Less than 1 ml.    Procedure Description:  After reviewing the patient's history and performing a focused physical examination, the diagnosis and positive previous diagnostic tests were confirmed and contraindications such as infection and coagulopathy were ruled out.  Following review of allergies and potential side effects and complications, including but not necessarily limited to infection, allergic reaction, local tissue breakdown, nerve injury,  and paresis, the patient consented for the procedure.    The patient was brought to the procedure room in the supine position.   The right knee was then identified and prepped and draped in the usual sterile fashion.  AP imaging was used to identify the path of the superior medial, superior lateral, inferior medial genicular nerves.  The overlying soft tissue was then anesthetized with 3 cc 1% lidocaine at each location.  Subsequently, a 3.5 inch 22-gauge spinal needle was advanced imaging guidance to contact bone along the path of the superior medial, superior lateral, and inferior medial genicular nerves.  Needle position was confirmed on both AP and lateral views.  After negative aspiration for heme, total mixture of 20 mg of Depo-Medrol with 9 cc of 0.5% bupivacaine was then evenly divided amongst the 3 sites.  The needle was then flushed with 1 cc 1% lidocaine, re-styletted and removed with tip intact.       This was then  repeated for the patient's left knee.  The patient tolerated the procedure very well without significant immediate complication.          Complications: None.    Follow up: The patient was followed in the pain clinic as needed basis.          Benjamín Fu MD

## 2024-12-03 NOTE — DISCHARGE INSTRUCTIONS
Home Care Instructions Following Your Pain Procedure     Korina,  It has been a pleasure to have you as our patient. To help you at home, you must follow these general discharge instructions. We will review these with you before you are discharged. It is our hope that you have a complete and uneventful recovery from our procedure.     General Instructions:  What to Expect:  Bandages from your procedure today can be removed when you get home.  Please avoid soaking and/or swimming for 24 hours.  Showering is okay  It is normal to have increased pain symptoms and/or pain at injection site for up to 3-5 days after procedure, you can use heat or ice (20 minutes on 20 minutes off) for comfort.  You may experience some temporary side effects which may include restlessness or insomnia, flushing of the face, or heart palpitations.  Please contact the provider if these symptoms do not resolve within 3-4 days.  Lightheadedness or nausea may occur and should resolve within 24 to 48 hours.  If you develop a headache after treatment, rest, drink fluids (with caffeine, if possible) and take mild over-the-counter pain medication.  If the headache does not improve with the above treatment, contact the physician.  Home Medications:  Resume all previously prescribed medication.  Please avoid taking NSAIDs (Non-Steriodal Anti-Inflammatory Drugs) such as:  Ibuprofen ( Advil, Motrin) Aleve (Naproxen), Diclofenac, Meloxicam for 6 hours after procedure.   If you are on Coumadin (Warfarin) or any other anti-coagulant (or \"blood thinning\") medication such as Plavix (Clopidogrel), Xarelto (Rivaroxaban), Eliquis (Apixaban), Effient (Prasugrel) etc., restart on the following day from the procedure unless otherwise directed by your provider.  If you are a diabetic, please increase the frequency of your glucose monitoring after the procedure as steroids may cause a temporary (2-3 day) increase in your blood sugar.  Contact your primary care  physician if your blood sugar remains elevated as you may require some medication adjustment.  Diet:  Resume your regular diet as tolerated.  Activity:  We recommend that you relax and rest during the rest of your procedure day.  If you feel weakness in your arms or legs do not drive.  Follow-up Appointment  Please schedule a follow-up visit within 3 to 4 weeks after your last procedure date.  Question or Concerns:  Feel free to call our office with any questions or concerns at [phone] (option #2)    Korina  Thank you for coming to [facility] for your procedure.  The nurses try very hard to make sure you receive the best care possible.  Your trust in them as well as us is greatly appreciated.    Thanks so much,   Dr. Benjamín Fu

## 2024-12-04 ENCOUNTER — TELEPHONE (OUTPATIENT)
Dept: PAIN CLINIC | Facility: CLINIC | Age: 79
End: 2024-12-04

## 2024-12-04 NOTE — TELEPHONE ENCOUNTER
Taylor called placed to patient for post procedure follow up. Patient stated she is feeling good and just has a little soreness around the area. Informed patient that soreness is to be expected after the procedure. Encouraged patient to alternate ice and heat. Pt verbalized understanding to call with any questions or concerns.    Procedure:   bilateral GENICULAR NERVE BLOCK with local       Date: 12/3/24  Follow up Visit Scheduled: 12/17/24 1330 w/ Tim

## 2024-12-10 ENCOUNTER — PATIENT OUTREACH (OUTPATIENT)
Dept: CASE MANAGEMENT | Age: 79
End: 2024-12-10

## 2024-12-10 ENCOUNTER — TELEPHONE (OUTPATIENT)
Dept: PAIN CLINIC | Facility: CLINIC | Age: 79
End: 2024-12-10

## 2024-12-10 DIAGNOSIS — M17.0 PRIMARY OSTEOARTHRITIS OF BOTH KNEES: Primary | ICD-10-CM

## 2024-12-10 DIAGNOSIS — I10 HYPERTENSION, UNSPECIFIED TYPE: ICD-10-CM

## 2024-12-10 DIAGNOSIS — I25.10 CORONARY ARTERY DISEASE INVOLVING NATIVE CORONARY ARTERY OF NATIVE HEART, UNSPECIFIED WHETHER ANGINA PRESENT: ICD-10-CM

## 2024-12-10 DIAGNOSIS — E78.2 HYPERLIPIDEMIA, MIXED: ICD-10-CM

## 2024-12-10 DIAGNOSIS — E66.01 OBESITY, MORBID (HCC): ICD-10-CM

## 2024-12-10 DIAGNOSIS — R06.02 SHORTNESS OF BREATH: ICD-10-CM

## 2024-12-10 DIAGNOSIS — F41.9 ANXIETY: ICD-10-CM

## 2024-12-10 DIAGNOSIS — F32.A DEPRESSION, UNSPECIFIED DEPRESSION TYPE: ICD-10-CM

## 2024-12-10 NOTE — TELEPHONE ENCOUNTER
Spoke with patient for CCM.     Patient is reporting relief from genicular nerve block procedure done on 12/03. Today she is reporting pain level of 3/10.     Patient states that she would like to know what she can take on PRN basis when her pain increases as her pain level is heavily dependent on activity level.   Currently, she is taking celebrex, but states that this does not provide her with any relief. Please advise.

## 2024-12-11 NOTE — TELEPHONE ENCOUNTER
Contacted pt at this time; per pt, she does not have pain right now, just wanting to have something on hand in case she's in pain. Advised pt that she can take Tylenol; pt states that Tylenol does not give any relief. Celebrex too. Advised pt that she can try topical cream; pt states those do not help either. Advised pt that unfortunately Tim will not be able to give her any opiates. Advised pt that she can reach out to her PCP and asks if they can give her something. Pt vu and no further questions.

## 2024-12-13 ENCOUNTER — OFFICE VISIT (OUTPATIENT)
Dept: FAMILY MEDICINE CLINIC | Facility: CLINIC | Age: 79
End: 2024-12-13
Payer: MEDICARE

## 2024-12-13 VITALS
HEART RATE: 91 BPM | RESPIRATION RATE: 18 BRPM | HEIGHT: 66 IN | SYSTOLIC BLOOD PRESSURE: 130 MMHG | OXYGEN SATURATION: 98 % | TEMPERATURE: 97 F | BODY MASS INDEX: 38 KG/M2 | DIASTOLIC BLOOD PRESSURE: 82 MMHG

## 2024-12-13 DIAGNOSIS — Z00.00 ENCOUNTER FOR ANNUAL HEALTH EXAMINATION: Primary | ICD-10-CM

## 2024-12-13 DIAGNOSIS — Z17.0 MALIGNANT NEOPLASM OF RIGHT BREAST IN FEMALE, ESTROGEN RECEPTOR POSITIVE, UNSPECIFIED SITE OF BREAST (HCC): ICD-10-CM

## 2024-12-13 DIAGNOSIS — E27.9 ADRENAL NODULE (HCC): ICD-10-CM

## 2024-12-13 DIAGNOSIS — R73.9 HYPERGLYCEMIA: ICD-10-CM

## 2024-12-13 DIAGNOSIS — E66.01 OBESITY, MORBID (HCC): ICD-10-CM

## 2024-12-13 DIAGNOSIS — D50.9 IRON DEFICIENCY ANEMIA, UNSPECIFIED IRON DEFICIENCY ANEMIA TYPE: ICD-10-CM

## 2024-12-13 DIAGNOSIS — C50.911 MALIGNANT NEOPLASM OF RIGHT BREAST IN FEMALE, ESTROGEN RECEPTOR POSITIVE, UNSPECIFIED SITE OF BREAST (HCC): ICD-10-CM

## 2024-12-13 DIAGNOSIS — I10 ESSENTIAL HYPERTENSION: ICD-10-CM

## 2024-12-13 DIAGNOSIS — E55.9 VITAMIN D DEFICIENCY: ICD-10-CM

## 2024-12-13 DIAGNOSIS — K44.9 HIATAL HERNIA: ICD-10-CM

## 2024-12-13 DIAGNOSIS — J42 CHRONIC BRONCHITIS, UNSPECIFIED CHRONIC BRONCHITIS TYPE (HCC): ICD-10-CM

## 2024-12-13 DIAGNOSIS — E78.2 HYPERLIPIDEMIA, MIXED: ICD-10-CM

## 2024-12-13 DIAGNOSIS — F32.A DEPRESSION, UNSPECIFIED DEPRESSION TYPE: ICD-10-CM

## 2024-12-13 DIAGNOSIS — R10.9 ABDOMINAL CRAMPS: ICD-10-CM

## 2024-12-13 RX ORDER — DULOXETIN HYDROCHLORIDE 20 MG/1
20 CAPSULE, DELAYED RELEASE ORAL DAILY
Qty: 90 CAPSULE | Refills: 1 | Status: SHIPPED | OUTPATIENT
Start: 2024-12-13

## 2024-12-13 RX ORDER — AMLODIPINE BESYLATE 5 MG/1
5 TABLET ORAL DAILY
Qty: 90 TABLET | Refills: 0 | Status: SHIPPED | OUTPATIENT
Start: 2024-12-13

## 2024-12-13 RX ORDER — BENAZEPRIL HYDROCHLORIDE 20 MG/1
20 TABLET ORAL DAILY
Qty: 90 TABLET | Refills: 1 | Status: SHIPPED | OUTPATIENT
Start: 2024-12-13

## 2024-12-13 RX ORDER — DULOXETIN HYDROCHLORIDE 60 MG/1
60 CAPSULE, DELAYED RELEASE ORAL DAILY
Qty: 90 CAPSULE | Refills: 1 | Status: SHIPPED | OUTPATIENT
Start: 2024-12-13

## 2024-12-13 RX ORDER — ROSUVASTATIN CALCIUM 5 MG/1
5 TABLET, COATED ORAL EVERY EVENING
Qty: 90 TABLET | Refills: 1 | Status: SHIPPED | OUTPATIENT
Start: 2024-12-13

## 2024-12-13 NOTE — PATIENT INSTRUCTIONS
Shingrix shingles vaccination.  Continue current meds.   Watch diet for fats and carbs.   Stay active.    Do fasting blood work next week.      Refill policies:      Allow 3 business days for refills; controlled substances may take longer.  Contact your pharmacy at least 5-7 business days prior to running out of medication and have them send an electronic request or submit through the \"request refill\" option thru your [employer] account. No need to do both, as multiple requests will create an automated [employer] message to notify of a denial for one of the duplicated requests, causing you undue confusion.   Refills are NOT addressed on weekends; covering physicians do not authorize routine medications on weekends.  No narcotics or controlled substances are refilled after noon on Fridays or by on call physicians.  By law, narcotics cannot be faxed or phoned into your pharmacy.  If your prescription is due for a refill, you may be due for a follow up appointment. Please call our office at [phone] to make an appointment or schedule an appointment via [facility].  To best provide you care, patients receiving routine medications need to be seen at least twice a year. Patients receiving narcotic/controlled substance medications need to be seen at least once every 3 months.  In the event that your preferred pharmacy does not have the requested medication in stock (ie Backordered), it is your responsibility to find another pharmacy that has the requested medication available. We will gladly send a new prescription to that pharmacy at your request.  controlled substances may not be able to be filled out of state due to license restrictions.  If you have a planned trip, it's best to call your pharmacy at least 5-7 business days to prevent any delays in your medication refill.    Scheduling Tests:    If your physician has ordered radiology tests such as MRI or CT scans, please contact Central Scheduling at [phone] right away  to schedule the test.  Once scheduled, the [facility] Centralized Referral Team will work with your insurance carrier to obtain pre-certification or prior authorization.  Depending on your insurance carrier, approval may take 3-10 days.  It is highly recommended patients assure they have received an authorization before having a test performed.  If test is done without insurance authorization, patient may be responsible for the entire amount billed.      Precertification and Prior Authorizations:  If your physician has recommended that you have a procedure or additional testing performed the [facility] Centralized Referral Team will contact your insurance carrier to obtain pre-certification or prior authorization.    You are strongly encouraged to contact your insurance carrier to verify that your procedure/test has been approved and is a COVERED benefit.  Although the [facility] Centralized Referral Team does its due diligence, the insurance carrier gives the disclaimer that \"Although the procedure is authorized, this does not guarantee payment.\"    Ultimately the patient is responsible for payment.   Thank you for your understanding in this matter.  Paperwork Completion:  If you require FMLA or disability paperwork for your recovery, please make sure to either drop it off or have it faxed to our office at [phone]. Be sure the form has your name and date of birth on it.  The form will be faxed to our Forms Department and they will complete it for you.  There is a 25$ fee for all forms that need to be filled out.  Please be aware there is a 10-14 day turnaround time.  You will need to sign a release of information (RAMAN) form if your paperwork does not come with one.  You may call the Forms Department with any questions at [phone].  Their fax number is [phone].

## 2024-12-13 NOTE — PROGRESS NOTES
Subjective:   Korina Mosquera is a 79 year old female who presents for a Medicare Subsequent Annual Wellness visit (Pt already had Initial Annual Wellness), also follow-up on hypertension hyperlipidemia obesity anemia depression, knee pain, hiatal hernia, history of right breast cancer shortness of breath on exertion, adrenal adenoma.    Hypertension patient is taking medications blood pressure stable continue medications    Hyperlipidemia patient had a blood work done levels were stable.  On medication asymptomatic.    Obesity patient lost some weight.  She refused to be put on the scale today she will continue watching her diet , monitor.    Anemia iron deficiency in the past most likely due to hiatal hernia monitor recheck blood work with next set of testing.    Right breast cancer patient is seeing oncologist undergoing active surveillance, patient was on tamoxifen.  Medication was stopped because she had postmenopausal bleeding.  She had endometrial biopsy which came back unremarkable rec commended to do D&C.  Patient saw OB/GYN.  Management by them.      Depression mood is stable.  She is taking Cymbalta 80 mg daily.  Needs refill of the medication doing well.   Patient complains of bilateral knee pain status post steroid injections , pain persistent she had ablation which helped Pain-free she is able to do shopping now.    Shortness of breath on exertion most likely due to hiatal hernia patient , had heart evaluation done by Dr. Schulte recently she saw Dr. Brandon again had some testing that were ordered for her echo of the heart will be done next week.  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  Was given nebulizers but that did not work make her to cough more and she is refusing to continue treatment with nebulizers.  She thinks it makes worse her condition.    Adrenal adenoma-hormone levels came back within normal limits continue monitoring  periodically.    Patient complains of having abdominal cramps she took hyoscyamine from her granddaughter which helped.  Patient would like prescription it was called and patient will monitor her symptoms.  If there is no improvement recommended to see GI doctor.    History/Other:   Fall Risk Assessment:   She has been screened for Falls and is High Risk. Fall Prevention information provided to patient in After Visit Summary.    Do you feel unsteady when standing or walking?: Yes  Do you worry about falling?: Yes  Have you fallen in the past year?: No     Cognitive Assessment:   She had a completely normal cognitive assessment - see flowsheet entries       Functional Ability/Status:   Korina Mosquera has some abnormal functions as listed below:  She has Hearing problems based on screening of functional status.      Depression Screening (PHQ-2/PHQ-9): PHQ-2 SCORE: 0  , done 12/13/2024          Advanced Directives:   She does NOT have a Living Will. [Do you have a living will?: No]    She does have a POA but we do NOT have it on file in Epic.      Discussed Advance Care Planning with patient (and family/surrogate if present). Standard forms made available to patient in After Visit Summary.      Patient Active Problem List   Diagnosis    Breast cancer (HCC)    Hypertension    S/P lumbar spine operation    Other specified counseling    Iron deficiency anemia    Depression    Anxiety    Shortness of breath    Gastrointestinal hemorrhage    Gastrointestinal hemorrhage, unspecified gastrointestinal hemorrhage type    Symptomatic anemia    FERRELL (dyspnea on exertion)    Hyperlipidemia, mixed    Internal hemorrhoids    Hiatal hernia    Obesity, morbid (HCC)    Adrenal nodule (HCC)    Chronic bronchitis, unspecified chronic bronchitis type (HCC)    Azotemia    Hyperglycemia    Acute biliary pancreatitis (HCC)    Acute biliary pancreatitis, unspecified complication status (HCC)    Biliary colic    Acute cholecystitis     Calculus of gallbladder with acute on chronic cholecystitis without obstruction    Hiatal hernia without gangrene and obstruction    Coronary artery disease involving native coronary artery of native heart    Primary osteoarthritis of both knees     Allergies:  She is allergic to codeine and hm lidocaine patch [lidocaine].    Current Medications:  Outpatient Medications Marked as Taking for the 12/13/24 encounter (Office Visit) with Hanna Avery MD   Medication Sig    amLODIPine 5 MG Oral Tab Take 1 tablet (5 mg total) by mouth daily.    Benazepril HCl 20 MG Oral Tab Take 1 tablet (20 mg total) by mouth daily.    DULoxetine 20 MG Oral Cap DR Particles Take 1 capsule (20 mg total) by mouth daily.    DULoxetine 60 MG Oral Cap DR Particles Take 1 capsule (60 mg total) by mouth daily.    rosuvastatin 5 MG Oral Tab Take 1 tablet (5 mg total) by mouth every evening.    hyoscyamine 0.125 MG Sublingual SL Tab Place 1 tablet (125 mcg total) under the tongue every 6 (six) hours as needed for Cramping.    Respiratory Therapy Supplies (FULL KIT NEBULIZER SET) Does not apply Misc 1 kit As Directed.    CELECOXIB 200 MG Oral Cap TAKE 1 CAPSULE(200 MG) BY MOUTH DAILY    Metoprolol Succinate ER 25 MG Oral Tablet 24 Hr Take 1 tablet (25 mg total) by mouth daily.    Cholecalciferol (VITAMIN D) 1000 units Oral Tab Take by mouth. Daily except when she takes weekly 50,000 dose    Omega-3 Fatty Acids (FISH OIL OR) Take by mouth daily.       Medical History:  She  has a past medical history of Anxiety (09/09/2016), Blood disorder, Breast CA (HCC) (05/2014), Breast cancer (HCC) (06/12/2014), Breast cancer (HCC) (06/12/2014), Breast cancer (HCC) (06/12/2014), Cancer (HCC), Depression, Exposure to medical diagnostic radiation, Hearing impairment, High blood pressure, High cholesterol, Hyperlipidemia, IBS (irritable bowel syndrome), Osteoarthritis, Personal history of antineoplastic chemotherapy, Shortness of breath, Unspecified  essential hypertension, and Visual impairment.  Surgical History:  She  has a past surgical history that includes back surgery (); upper gi endoscopy,biopsy (N/A, 2014); colon ca scrn not hi rsk ind (N/A, 2014); patient withough preoperative order for iv antibiotic surgical site infection prophylaxis. (N/A, 2014); patient documented not to have experienced any of the following events (N/A, 2014); chemotherapy (); radiation left (); trudi biopsy stereo nodule 1 site right (cpt=19081) (2015); lumpectomy left (2014); upper gi endoscopy,biopsy (18= Hiatal hernia. SB Bx normal); colonoscopy (18= Hemorrhoids); colonoscopy (N/A, 2018); capsule endoscopy - internal referral (18= Normal); upper gi endoscopy,exam; spine surgery procedure unlisted; and trudi biopsy stereo nodule 1 site right (cpt=19081) (2022).   Family History:  Her family history includes Breast Cancer in her paternal aunt and paternal cousin female; Breast Cancer (age of onset: 70) in her self; Breast Cancer (age of onset: 71) in her sister; COPD in her sister; Heart Attack in her father; pneumonia in her mother; unknown cause 32 in her brother.  Social History:  She  reports that she quit smoking about 31 years ago. Her smoking use included cigarettes. She started smoking about 51 years ago. She has a 20 pack-year smoking history. She has never used smokeless tobacco. She reports that she does not drink alcohol and does not use drugs.    Tobacco:  She smoked tobacco in the past but quit greater than 12 months ago.  Social History     Tobacco Use   Smoking Status Former    Current packs/day: 0.00    Average packs/day: 1 pack/day for 20.0 years (20.0 ttl pk-yrs)    Types: Cigarettes    Start date: 10/10/1973    Quit date: 10/10/1993    Years since quittin.1   Smokeless Tobacco Never   Tobacco Comments    Updated 24            CAGE Alcohol Screen:   CAGE screening score of 0 on 2024,  showing low risk of alcohol abuse.      Patient Care Team:  Hanna Avery MD as PCP - General (Family Medicine)  Elida German as [facility] Care Manager (Care Management)  Gomez Casanova MD (Inactive) (ONCOLOGY)  Gabby Schulte MD as Consulting Physician (Cardiovascular Diseases)  Tim Pizano MD (Surgery, Orthopedic Adult Reconstructive)  Casey Carlin PA (Physician Assistant Surgical)  Julian Gunn MD (ONCOLOGY)  Madison Hair MD (OBSTETRICS & GYNECOLOGY)  Joaquin Sanderson MD (SURGERY, GENERAL)  Benjamín Fu MD (Anesthesiology Pain Medicine)    Review of Systems  GENERAL: feels well otherwise  SKIN: denies any unusual skin lesions  EYES: denies blurred vision or double vision  HEENT: denies nasal congestion, sinus pain or ST  LUNGS: denies shortness of breath with exertion  CARDIOVASCULAR: denies chest pain on exertion  GI: denies abdominal pain, denies heartburn  : denies dysuria, vaginal discharge or itching, no complaint of urinary incontinence   MUSCULOSKELETAL: denies back pain, status post ablation of the nerve bilateral knee is doing much better.  NEURO: denies headaches  PSYCHE: denies depression or anxiety  HEMATOLOGIC: denies hx of anemia  ENDOCRINE: denies thyroid history  ALL/ASTHMA: denies hx of allergy or asthma    Objective:   Physical Exam  General Appearance:  Alert, cooperative, no distress, appears stated age   Head:  Normocephalic, without obvious abnormality, atraumatic   Eyes:  PERRL, conjunctiva/corneas clear, EOM's intact both eyes   Ears:  Normal TM's and external ear canals, both ears   Nose: Nares normal, septum midline,mucosa normal, no drainage or sinus tenderness   Throat: Lips, mucosa, and tongue normal; teeth and gums normal   Neck: Supple, symmetrical, trachea midline, no adenopathy;  thyroid: not enlarged, symmetric, no tenderness/mass/nodules; no carotid bruit or JVD   Back:   Symmetric, no curvature, ROM normal, no CVA tenderness   Lungs:   Clear to  auscultation bilaterally, respirations unlabored   Heart:  Regular rate and rhythm, S1 and S2 normal, no murmur, rub, or gallop   Abdomen:   Soft, non-tender, bowel sounds active all four quadrants,  no masses, no organomegaly   Pelvic: Deferred  Breast examination done by oncologist   Extremities: Extremities normal, atraumatic, no cyanosis or edema   Pulses: 2+ and symmetric   Skin: Skin color, texture, turgor normal, no rashes or lesions   Lymph nodes: Cervical, supraclavicular, and axillary nodes normal   Neurologic: Normal       /82 (BP Location: Left arm, Patient Position: Sitting, Cuff Size: adult)   Pulse 91   Temp 96.9 °F (36.1 °C) (Temporal)   Resp 18   Ht 5' 6\" (1.676 m)   SpO2 98%   BMI 38.09 kg/m²  Estimated body mass index is 38.09 kg/m² as calculated from the following:    Height as of this encounter: 5' 6\" (1.676 m).    Weight as of 8/14/24: 236 lb (107 kg).    Medicare Hearing Assessment:   Hearing Screening    Time taken: 12/13/2024 11:39 AM  Screening Method: Finger Rub  Finger Rub Result: Pass           Results for orders placed or performed in visit on 06/19/24   Comp Metabolic Panel (14)    Collection Time: 06/19/24 12:08 PM   Result Value Ref Range    Glucose 110 (H) 70 - 99 mg/dL    Sodium 141 136 - 145 mmol/L    Potassium 4.0 3.5 - 5.1 mmol/L    Chloride 112 98 - 112 mmol/L    CO2 24.0 21.0 - 32.0 mmol/L    Anion Gap 5 0 - 18 mmol/L    BUN 19 9 - 23 mg/dL    Creatinine 1.03 (H) 0.55 - 1.02 mg/dL    Calcium, Total 9.4 8.5 - 10.1 mg/dL    Calculated Osmolality 295 275 - 295 mOsm/kg    eGFR-Cr 55 (L) >=60 mL/min/1.73m2    AST 20 15 - 37 U/L    ALT 25 13 - 56 U/L    Alkaline Phosphatase 78 55 - 142 U/L    Bilirubin, Total 0.5 0.1 - 2.0 mg/dL    Total Protein 7.1 6.4 - 8.2 g/dL    Albumin 3.6 3.4 - 5.0 g/dL    Globulin  3.5 2.8 - 4.4 g/dL    A/G Ratio 1.0 1.0 - 2.0    Patient Fasting for CMP? Yes    Ferritin    Collection Time: 06/19/24 12:08 PM   Result Value Ref Range    Ferritin  21.0 18.0 - 340.0 ng/mL   Iron And Tibc    Collection Time: 06/19/24 12:08 PM   Result Value Ref Range    Iron 74 50 - 170 ug/dL    Transferrin 314 200 - 360 mg/dL    Total Iron Binding Capacity 468 (H) 240 - 450 ug/dL    % Saturation 16 15 - 50 %   Lipid Panel    Collection Time: 06/19/24 12:08 PM   Result Value Ref Range    Cholesterol, Total 150 <200 mg/dL    HDL Cholesterol 52 40 - 59 mg/dL    Triglycerides 155 (H) 30 - 149 mg/dL    LDL Cholesterol 72 <100 mg/dL    VLDL 24 0 - 30 mg/dL    Non HDL Chol 98 <130 mg/dL    Patient Fasting for Lipid? Yes    Hemoglobin A1C [E]    Collection Time: 06/19/24 12:08 PM   Result Value Ref Range    HgbA1C 5.2 <5.7 %    Estimated Average Glucose 103 68 - 126 mg/dL   CBC W/ DIFFERENTIAL    Collection Time: 06/19/24 12:08 PM   Result Value Ref Range    WBC 7.4 4.0 - 11.0 x10(3) uL    RBC 4.66 3.80 - 5.30 x10(6)uL    HGB 14.0 12.0 - 16.0 g/dL    HCT 44.0 35.0 - 48.0 %    .0 150.0 - 450.0 10(3)uL    MCV 94.4 80.0 - 100.0 fL    MCH 30.0 26.0 - 34.0 pg    MCHC 31.8 31.0 - 37.0 g/dL    RDW 14.1 %    Neutrophil Absolute Prelim 4.38 1.50 - 7.70 x10 (3) uL    Neutrophil Absolute 4.38 1.50 - 7.70 x10(3) uL    Lymphocyte Absolute 1.98 1.00 - 4.00 x10(3) uL    Monocyte Absolute 0.60 0.10 - 1.00 x10(3) uL    Eosinophil Absolute 0.34 0.00 - 0.70 x10(3) uL    Basophil Absolute 0.08 0.00 - 0.20 x10(3) uL    Immature Granulocyte Absolute 0.02 0.00 - 1.00 x10(3) uL    Neutrophil % 59.1 %    Lymphocyte % 26.8 %    Monocyte % 8.1 %    Eosinophil % 4.6 %    Basophil % 1.1 %    Immature Granulocyte % 0.3 %     Assessment & Plan:   Korina Mosquera is a 79 year old female who presents for a Medicare Assessment.     1. Encounter for annual health examination (Primary)  2. Hyperlipidemia, mixed  -     Comp Metabolic Panel (14); Future; Expected date: 12/13/2024  -     Lipid Panel; Future; Expected date: 12/13/2024  -     TSH W Reflex To Free T4; Future; Expected date: 12/13/2024  -      Rosuvastatin Calcium; Take 1 tablet (5 mg total) by mouth every evening.  Dispense: 90 tablet; Refill: 1  3. Essential hypertension  -     CBC With Differential With Platelet; Future; Expected date: 12/13/2024  -     Urinalysis with Culture Reflex; Future; Expected date: 12/13/2024  -     amLODIPine Besylate; Take 1 tablet (5 mg total) by mouth daily.  Dispense: 90 tablet; Refill: 0  -     Benazepril HCl; Take 1 tablet (20 mg total) by mouth daily.  Dispense: 90 tablet; Refill: 1  4. Hyperglycemia  -     Hemoglobin A1C; Future; Expected date: 12/13/2024  5. Iron deficiency anemia, unspecified iron deficiency anemia type  -     Ferritin; Future; Expected date: 12/13/2024  -     Iron And Tibc; Future; Expected date: 12/13/2024  6. Depression, unspecified depression type  -     DULoxetine HCl; Take 1 capsule (20 mg total) by mouth daily.  Dispense: 90 capsule; Refill: 1  -     DULoxetine HCl; Take 1 capsule (60 mg total) by mouth daily.  Dispense: 90 capsule; Refill: 1  7. Chronic bronchitis, unspecified chronic bronchitis type (HCC)  8. Adrenal nodule (HCC)  9. Obesity, morbid (HCC)  10. Malignant neoplasm of right breast in female, estrogen receptor positive, unspecified site of breast (HCC)  Overview:  Her-2 positive  11. Hiatal hernia  12. Vitamin D deficiency  13. Abdominal cramps  -     Hyoscyamine Sulfate; Place 1 tablet (125 mcg total) under the tongue every 6 (six) hours as needed for Cramping.  Dispense: 30 tablet; Refill: 0  Hypertension blood pressure stable continue current medications    Iron deficiency anemia monitor blood work for recommendation pending test result    Hyperlipidemia  On rosuvastatin continue present management    Hyperglycemia continue monitoring sugar levels patient is watching diet    Depression i continue duloxetine to 80 mg daily monitor.  Stable doing well.    Bilateral knee pain status post ablation doing much better monitor.    Bilateral adrenal adenoma/adrenal nodule not  hormonally active monitor    Hiatal hernia -right now stable continue monitoring.     Shortness of breath on exertion-chronic bronchitis patient is seeing pulmonologist evaluation pending patient will complete testing.    Obesity continue watching diet for sugars and weight loss.    Breast cancer right seeing oncologist and surgeon management by them stable.  Mammogram up-to-date.    Abdominal cramps patient would like prescription for hyoscyamine she was getting some medication from her granddaughter which is helping she takes it only occasionally.    Vitamin D deficiency was low in the past check vitamin D level replace if needed.    Shingrix shingles vaccination.  Continue current meds.   Watch diet for fats and carbs.   Stay active.    Do fasting blood work next week.    The patient indicates understanding of these issues and agrees to the plan.  Lab work ordered.  Reinforced healthy diet, lifestyle, and exercise.      No follow-ups on file.     Hanna Avery MD, 6/10/2022     Supplementary Documentation:   General Health:  In the past six months, have you lost more than 10 pounds without trying?: 2 - No  Has your appetite been poor?: No  Type of Diet: Low Carb  How does the patient maintain a good energy level?: Daily Walks  How would you describe your daily physical activity?: Light  How would you describe your current health state?: Good  How do you maintain positive mental well-being?: Visiting Family  On a scale of 0 to 10, with 0 being no pain and 10 being severe pain, what is your pain level?: 4 - (Moderate) (bilateral knee pain)  In the past six months, have you experienced urine leakage?: 0-No  At any time do you feel concerned for the safety/well-being of yourself and/or your children, in your home or elsewhere?: No  Have you had any immunizations at another office such as Influenza, Hepatitis B, Tetanus, or Pneumococcal?: No       Korina Mosquera's SCREENING SCHEDULE   Tests on this list  are recommended by your physician but may not be covered, or covered at this frequency, by your insurer.   Please check with your insurance carrier before scheduling to verify coverage.   PREVENTATIVE SERVICES FREQUENCY &  COVERAGE DETAILS LAST COMPLETION DATE   Diabetes Screening    Fasting Blood Sugar /  Glucose    One screening every 12 months if never tested or if previously tested but not diagnosed with pre-diabetes   One screening every 6 months if diagnosed with pre-diabetes Lab Results   Component Value Date    GLUCOSE 99 07/18/2006     (H) 06/19/2024        Cardiovascular Disease Screening    Lipid Panel  Cholesterol  Lipoprotein (HDL)  Triglycerides Covered every 5 years for all Medicare beneficiaries without apparent signs or symptoms of cardiovascular disease Lab Results   Component Value Date    CHOLEST 150 06/19/2024    HDL 52 06/19/2024    LDL 72 06/19/2024    TRIG 155 (H) 06/19/2024         Electrocardiogram (EKG)   Covered if needed at Welcome to Medicare, and non-screening if indicated for medical reasons 12/19/2023      Ultrasound Screening for Abdominal Aortic Aneurysm (AAA) Covered once in a lifetime for one of the following risk factors    Men who are 65-75 years old and have ever smoked    Anyone with a family history -     Colorectal Cancer Screening  Covered for ages 50-85; only need ONE of the following:    Colonoscopy   Covered every 10 years    Covered every 2 years if patient is at high risk or previous colonoscopy was abnormal 01/09/2018    No recommendations at this time    Flexible Sigmoidoscopy   Covered every 4 years -    Fecal Occult Blood Test Covered annually -   Bone Density Screening    Bone density screening    Covered every 2 years after age 65 if diagnosed with risk of osteoporosis or estrogen deficiency.    Covered yearly for long-term glucocorticoid medication use (Steroids) Last Dexa Scan:    XR DEXA BONE DENSITOMETRY (CPT=77080) 11/09/2020      No recommendations  at this time   Pap and Pelvic    Pap   Covered every 2 years for women at normal risk; Annually if at high risk 08/31/2023  No recommendations at this time    Chlamydia Annually if high risk -  No recommendations at this time   Screening Mammogram    Mammogram     Recommend annually for all female patients aged 40 and older    One baseline mammogram covered for patients aged 35-39 04/08/2024    No recommendations at this time    Immunizations    Influenza Covered once per flu season  Please get every year 09/26/2024  No recommendations at this time    Pneumococcal Each vaccine ([id number] & [id number]) covered once after 65 Prevnar 13: 05/10/2018    [id number]: 10/21/2019     No recommendations at this time    Hepatitis B One screening covered for patients with certain risk factors   -  No recommendations at this time    Tetanus Toxoid Not covered by Medicare Part B unless medically necessary (cut with metal); may be covered with your pharmacy prescription benefits -    Tetanus, Diptheria and Pertusis TD and TDaP Not covered by Medicare Part B -  No recommendations at this time    Zoster Not covered by Medicare Part B; may be covered with your pharmacy  prescription benefits -  Zoster Vaccines(2 of 2) due on 11/21/2024     Annual Monitoring of Persistent Medications (ACE/ARB, digoxin diuretics, anticonvulsants)    Potassium Annually Lab Results   Component Value Date    K 4.0 06/19/2024         Creatinine   Annually Lab Results   Component Value Date    CREATSERUM 1.03 (H) 06/19/2024         BUN Annually Lab Results   Component Value Date    BUN 19 06/19/2024       Drug Serum Conc Annually No results found for: \"DIGOXIN\", \"DIG\", \"VALP\"           Chronic Obstructive Pulmonary Disease (COPD)    Spirometry Annually Spirometry date: 12/13/2017

## 2024-12-17 ENCOUNTER — VIRTUAL PHONE E/M (OUTPATIENT)
Dept: PAIN CLINIC | Facility: CLINIC | Age: 79
End: 2024-12-17
Payer: MEDICARE

## 2024-12-17 ENCOUNTER — TELEPHONE (OUTPATIENT)
Dept: PAIN CLINIC | Facility: CLINIC | Age: 79
End: 2024-12-17

## 2024-12-17 DIAGNOSIS — M25.562 BILATERAL CHRONIC KNEE PAIN: Primary | ICD-10-CM

## 2024-12-17 DIAGNOSIS — M25.561 BILATERAL CHRONIC KNEE PAIN: Primary | ICD-10-CM

## 2024-12-17 DIAGNOSIS — G89.29 BILATERAL CHRONIC KNEE PAIN: Primary | ICD-10-CM

## 2024-12-17 PROCEDURE — 99213 OFFICE O/P EST LOW 20 MIN: CPT | Performed by: PHYSICIAN ASSISTANT

## 2024-12-17 NOTE — PROGRESS NOTES
Korina Mosquera verbally consents to a teleVisit Check-In service on 12/17/24.    Duration of Service:  20 minutes      HPI:   Korina Mosquera presents with complaints of bilateral knee pain.    The pain is described as severe aching that is intermittent.  The patient’s activity level has remained the same since last visit.  The pain is worst unrelated to time of day.    Changes in condition/history since last visit: pt is here today via tele visit for f/u having had B GNB on 12/3/24.  Procedure was well tolerated, and had no adverse effects.  Overall, reports 80% initial relief ,with 70% sustained.  Unfortunately, yesterday, had flare of pain and had largely returned to baseline.  Today, states that this flare has begun to resolve, though certainly still with significant knee pain.  Wishes to consider radiofrequency ablation.    Last procedure: Bilateral genicular nerve block  date: 12/3/2024    Percent relief: 80%    Duration: 70% sustained relief until yesterday when pain flared    Last procedure: Bilateral intra-articular knee injection (steroid)    date: 8/14/2024    Percentage of relief experienced from the procedure: 70%    Duration of the relief: 2-3 months    The following activities will increase the patient’s pain: walking, standing    The following activities decrease the patient’s pain: limiting activity level    Functional Assessment: Patient reports that they are able to complete all of their ADL's such as eating, bathing, using the toilet, dressing and getting up from a bed or a chair independently.    Current Medications:  Current Outpatient Medications   Medication Sig Dispense Refill    amLODIPine 5 MG Oral Tab Take 1 tablet (5 mg total) by mouth daily. 90 tablet 0    Benazepril HCl 20 MG Oral Tab Take 1 tablet (20 mg total) by mouth daily. 90 tablet 1    DULoxetine 20 MG Oral Cap DR Particles Take 1 capsule (20 mg total) by mouth daily. 90 capsule 1    DULoxetine 60 MG Oral Cap   Particles Take 1 capsule (60 mg total) by mouth daily. 90 capsule 1    rosuvastatin 5 MG Oral Tab Take 1 tablet (5 mg total) by mouth every evening. 90 tablet 1    hyoscyamine 0.125 MG Sublingual SL Tab Place 1 tablet (125 mcg total) under the tongue every 6 (six) hours as needed for Cramping. 30 tablet 0    Respiratory Therapy Supplies (FULL KIT NEBULIZER SET) Does not apply Misc 1 kit As Directed. 1 each 0    CELECOXIB 200 MG Oral Cap TAKE 1 CAPSULE(200 MG) BY MOUTH DAILY 90 capsule 0    Metoprolol Succinate ER 25 MG Oral Tablet 24 Hr Take 1 tablet (25 mg total) by mouth daily.      Cholecalciferol (VITAMIN D) 1000 units Oral Tab Take by mouth. Daily except when she takes weekly 50,000 dose      Omega-3 Fatty Acids (FISH OIL OR) Take by mouth daily.        Patient requires assistance with: No assistance required    Reviewed Patient History Dated: 12/3/24 no changes noted    Physical Exam:   There were no vitals taken for this visit.  VAS Pain Score:  0-10/10  General Appearance: Well developed, well nourished, normal build, independent body habitus, no apparent physical disabilities, well groomed    Neurological Exam: WNL-Orientation to time, place and person, normal mood & effect, normal concentration & attention span  Inspection: (tele visit)  Radiology/Lab Test Reviewed: XR knee 5/2022:  CONCLUSION:  Severe osteoarthritis of the left knee most pronounced in the medial compartment.   CONCLUSION:  Severe tricompartmental osteoarthritis of the right knee most pronounced in the medial compartment.   Lab Results   Component Value Date    WBC 7.4 06/19/2024    WBC 9.0 12/19/2023    WBC 7.8 11/09/2023   No results found for: \"HEMOGLOBIN\"  Lab Results   Component Value Date    .0 06/19/2024    .0 12/19/2023    .0 11/09/2023     Do you have any known blood/bleeding disorders?  No  Does patient currently take blood thinners?   None  Does patient currently take any antibiotics?   No  Patient educated  and verbalized understanding.  Medical Decision Making:   Diagnosis:    Encounter Diagnosis   Name Primary?    Bilateral chronic knee pain Yes       Impression: Excellent though non lasting relief with bilateral intra-articular knee injections with steroid on 8/14/2024.  For over 2 months, was 70% improved, though pain has since rapidly returned to baseline.  She had discussed genicular nerve blocks at her initial visit, and did proceed on 12/3/2024, to 80% initial improvement with 70% sustained relief.  Yesterday pain flared, and while this has begun to improve today, who is certainly still with meaningful knee pain, for which, wishes to proceed with the previously discussed radiofrequency ablation.    Plan: Patient to schedule the following injection: Bilateral genicular nerve RFA Levels: N/A, Procedure and risks were discussed with pt. including headache, bleeding, infection and potential nerve damage.  Follow-up 2 to 3 weeks.      No orders of the defined types were placed in this encounter.      Meds & Refills for this Visit:  Requested Prescriptions      No prescriptions requested or ordered in this encounter       Imaging & Consults:  None    The patient indicates understanding of these issues and agrees to the plan.    STEFANIE Reed

## 2024-12-17 NOTE — TELEPHONE ENCOUNTER
Order Questions    Question Answer   Anesthesia Type Local   Provider [facility] Procedure Lab   Procedure RFA   Laterality/Level bilateral genicular nerve   CPT (Hit enter after each entry) DSTRJ NUYVEST AGT GNCLR NRV   Medical clearance requested (will send to Pain Navigator) No   Patient has Medicare coverage? Yes

## 2024-12-18 NOTE — TELEPHONE ENCOUNTER
Patient advised of insurance approval to proceed with injections and is agreeable to scheduling. Patient scheduled for procedure, pre-procedure instructions reviewed. Patient prefers Local sedation. Reviewed sedation instructions including No Fasting & No  Required. Patient encouraged but not required to hold Celecoxib for 24 hours and Fish Oil supplement for 5 days prior to procedure. Patient verbalized understanding of instructions, no further needs at this time.     Patient asked what RFA involved? Informed patient that RFA is procedure where they burn the coat of the genicular nerves around the knee.   Offered patient to send information via [employer] that she can read over - patient declined.     [facility]  PRE-PROCEDURE INSTRUCTIONS WITHOUT SEDATION    Procedure: Bilateral Genicular Nerve RFA      Appointment Date: 01/16/2025  Check-In Time: 01:00 PM    Follow-Up Date/Time: patient declined.     Prior to the procedure:  Please update us prior to the procedure if you are experiencing any symptoms of infection such as cough, fever, chills, urinary symptoms, or have recently been prescribed antibiotics, have open wounds, have recently had surgery or dental procedures.    Day of Procedure:  **Drivers will be required for patients who receive prescriptions for Valium.    NO FASTING REQUIRED  Please bring your Insurance Card, Photo ID, List of Current Medications and Referral (if applicable) to your appointment.  Please park in the [facility] [employer] [facility] and follow the signs to the \[facility] [place] [facility]\"".  Check in at [facility] ([address] [place] [address]) outpatient registration in the \[facility] [place] [facility]\"".  Please note-No prescriptions will be written by Pain Clinic in [place] on the day of procedure. If you require a refill of medications, please contact the office 48 hours prior to your procedure.  If you have an implanted Spinal Cord or Peripheral Nerve Stimulator: Please remember to turn device off  for procedure.        Medication Hold:    Number of days you need to be off for the following medications:    Aggrenox 10 days   Agrylin (Anagrelide) 10 days  Brilinta (Ticagrelor) 7 days  Imbruvica (Ibrutinib) 3 days   Enbrel (Etanercept) 24 hours   Fragmin (Dalteparin) 24 hours   Pletal (Cilostazol) 7 days  Effient (Prasugrel) 7 days  Pradaxa 10 days  Trental 7 days  Eliquis (Apixaban) 3 days  Xarelto (Rivaroxaban) 3 days  Lovenox (Enoxaparin) 24 hours  Aspirin  Greater than 81mg but less than 325mg   5 days  325mg and greater                  7 days  Coumadin       5 days  Procedure may be cancelled if INR is elevated.   Excedrin (with aspirin) 7 days  Plavix (Clopidogrel)                            7 days    NSAIDs: 24 hours preferred      Ibuprofen (Motrin, Advil, Vicoprofen), Naproxen (Naprosyn, Aleve), Piroxcam (Feldene), Meloxicam (Mobic), Oxaprozin (Daypro), Diclofenac (Voltaren), Indomethacin (Indocin), Etodolac (Lodine), Nabumetone (Relafen), Celebrex (Celecoxib)           HERBAL SUPPLEMENTS  5 days preferred  Fish oil, krill oil, Omega-3, Vascepa, Vitamin E, Turmeric, Garlic                       Insurance Authorization:   Most insurances are now requiring a preauthorization for all procedures.  In the event that your insurance does not authorize your procedure within 48 hours of the scheduled date, your procedure will be cancelled and rescheduled to a later date.  Please contact your insurance carrier to determine what your financial responsibility will be for the procedure(s).      Cancellation/Rescheduling Appointment:   In the event you need to cancel or reschedule your appointment, you must notify the office 24 hours prior.    Post-procedure instructions:        Please schedule a follow up visit within 2 to 4 weeks after your last procedure date   Please call our office with any questions or concerns before or after your procedure at  [phone].  If you are a diabetic, please increase the  frequency of your glucose monitoring after the procedure as this may cause a temporary increase in your blood sugar.  Contact your primary care physician if your blood sugar rises as you may require some medication adjustment.  It is normal to have increased pain at injection site for up to 3-5 days after procedure, you can use heat or ice (20 minutes on 20 minutes off) for comfort.    **To hear a recorded version of these instructions, please call [phone] and follow the prompts.  **Para escuchar las instrucciones en Español, por favor de llamar el latonya [phone] opción 4.

## 2024-12-24 ENCOUNTER — PATIENT MESSAGE (OUTPATIENT)
Dept: FAMILY MEDICINE CLINIC | Facility: CLINIC | Age: 79
End: 2024-12-24

## 2025-01-16 ENCOUNTER — HOSPITAL ENCOUNTER (OUTPATIENT)
Facility: HOSPITAL | Age: 80
Setting detail: HOSPITAL OUTPATIENT SURGERY
Discharge: HOME | End: 2025-01-16
Attending: ANESTHESIOLOGY | Admitting: ANESTHESIOLOGY
Payer: MEDICARE

## 2025-01-16 ENCOUNTER — APPOINTMENT (OUTPATIENT)
Dept: GENERAL RADIOLOGY | Facility: HOSPITAL | Age: 80
End: 2025-01-16
Attending: ANESTHESIOLOGY
Payer: MEDICARE

## 2025-01-16 VITALS
DIASTOLIC BLOOD PRESSURE: 82 MMHG | BODY MASS INDEX: 37.93 KG/M2 | TEMPERATURE: 98 F | SYSTOLIC BLOOD PRESSURE: 120 MMHG | WEIGHT: 236 LBS | RESPIRATION RATE: 16 BRPM | OXYGEN SATURATION: 93 % | HEIGHT: 66 IN | HEART RATE: 74 BPM

## 2025-01-16 PROCEDURE — 64624 DSTRJ NULYT AGT GNCLR NRV: CPT | Performed by: ANESTHESIOLOGY

## 2025-01-16 PROCEDURE — 015D3ZZ DESTRUCTION OF FEMORAL NERVE, PERCUTANEOUS APPROACH: ICD-10-PCS | Performed by: ANESTHESIOLOGY

## 2025-01-16 RX ORDER — NALOXONE HYDROCHLORIDE 0.4 MG/ML
0.08 INJECTION, SOLUTION INTRAMUSCULAR; INTRAVENOUS; SUBCUTANEOUS AS NEEDED
Status: DISCONTINUED | OUTPATIENT
Start: 2025-01-16 | End: 2025-01-16

## 2025-01-16 RX ORDER — BUPIVACAINE HYDROCHLORIDE 5 MG/ML
INJECTION, SOLUTION EPIDURAL; INTRACAUDAL
Status: DISCONTINUED | OUTPATIENT
Start: 2025-01-16 | End: 2025-01-16

## 2025-01-16 RX ORDER — LIDOCAINE HYDROCHLORIDE 10 MG/ML
INJECTION, SOLUTION EPIDURAL; INFILTRATION; INTRACAUDAL; PERINEURAL
Status: DISCONTINUED | OUTPATIENT
Start: 2025-01-16 | End: 2025-01-16

## 2025-01-16 RX ORDER — METHYLPREDNISOLONE ACETATE 40 MG/ML
INJECTION, SUSPENSION INTRA-ARTICULAR; INTRALESIONAL; INTRAMUSCULAR; SOFT TISSUE
Status: DISCONTINUED | OUTPATIENT
Start: 2025-01-16 | End: 2025-01-16

## 2025-01-16 NOTE — OPERATIVE REPORT
[facility]  Operative Report  2025     Korina Mosquera Patient Status:  Hospital Outpatient Surgery    1945 MRN [id number]   Location [facility] Attending Benjamín Fu MD   Hosp Day # 0 PCP Hanna Avery MD     Indication: Korina is a 79 year old female with osteoarthritis of both knees here for genicular nerve radiofrequency ablation    Preoperative Diagnosis:  Bilateral chronic knee pain [M25.561, M25.562, G89.29]    Postoperative Diagnosis: Same as above.    Procedure performed: BILATERAL GENICULAR NERVE RFA WITH LOCAL    Anesthesia: Local  .    EBL: Less than 1 ml.    Procedure Description:  After reviewing the patient's history and performing a focused physical examination, the diagnosis and positive previous diagnostic tests were confirmed and contraindications such as infection and coagulopathy were ruled out.  Following review of allergies and potential side effects and complications, including but not necessarily limited to infection, allergic reaction, local tissue breakdown, nerve injury, and paresis, the patient consented for the procedure.    The patient was brought to the procedure room in the supine position.  The left knee was then identified and prepped and draped in the usual sterile fashion.  AP imaging was used to identify the path of the superior medial, superior lateral, inferior medial genicular nerves.  The overlying soft tissue was then anesthetized with 3 cc 1% lidocaine at each location.  Subsequently, a radiofrequency ablation needle was advanced imaging guidance to contact bone along the path of the superior medial, superior lateral, and inferior medial genicular nerves.  Needle position was confirmed on both AP and lateral views.  Motor testing was negative from local motor involvement.  Subsequently, 1 cc of 1% lidocaine was instilled through each needle.  Radiofrequency ablation was completed for 90 seconds at 80 °C.  After  negative aspiration for heme, total mixture of 20 mg of Depo-Medrol with 5 cc of 0.5% bupivacaine was then evenly divided amongst the 3 sites.  The needle was then flushed with 1 cc 1% lidocaine, re-styletted and removed with tip intact.       This was repeated on the patient's right knee.  The patient tolerated the procedure very well without significant immediate complication.        Complications: None.    Follow up: The patient was followed in the pain clinic as needed basis.          Benjamín Fu MD

## 2025-01-16 NOTE — H&P
History & Physical Examination    Patient Name: Korina Mosquera  MRN: [id number]  CSN: [id number]  YOB: 1945    Pre-Operative Diagnosis:  Bilateral chronic knee pain [M25.561, M25.562, G89.29]    Present Illness: Chronic knee pain    ASA: 3  MP class: 1  Sedation: Local     Prescriptions Prior to Admission[1]  No current facility-administered medications for this encounter.       Allergies: Allergies[2]    Past Medical History:    Anxiety    Blood disorder    Anemia    Breast CA (HCC)    Breast cancer (HCC)    Her-2 positive    Breast cancer (HCC)    Her-2 positive    Breast cancer (HCC)    Her-2 positive    Cancer (HCC)    Depression    Exposure to medical diagnostic radiation    last tx 2015    Hearing impairment    hard of hearing, no hearing aids    High blood pressure    High cholesterol    Hyperlipidemia    IBS (irritable bowel syndrome)    Osteoarthritis    Personal history of antineoplastic chemotherapy    last tx 2015    Shortness of breath    Unspecified essential hypertension    Visual impairment    Readers     Past Surgical History:   Procedure Laterality Date    Back surgery  1970    slipped disc    Capsule endoscopy - internal referral  1/24/18= Normal    Chemotherapy  2014    Colon ca scrn not hi rsk ind N/A 12/11/2014    Procedure: ESOPHAGOGASTRODUODENOSCOPY, COLONOSCOPY, POSSIBLE BIOPSY, POSSIBLE POLYPECTOMY 95798,36622;  Surgeon: Mikie Stock MD;  [place] [facility]    Colonoscopy  1/9/18= Hemorrhoids    Repeat PRN    Colonoscopy N/A 01/09/2018    Procedure: COLONOSCOPY;  Surgeon: Onur Levi MD;  Location:  ENDOSCOPY    Lumpectomy left  05/2014    IDC -done at [facility]    [place] biopsy stereo nodule 1 site right (cpt=19081)  03/2015    benign    [place] biopsy stereo nodule 1 site right (cpt=19081)  02/2022    radial scars    Patient documented not to have experienced any of the following events N/A 12/11/2014    Procedure:  ESOPHAGOGASTRODUODENOSCOPY, COLONOSCOPY, POSSIBLE BIOPSY, POSSIBLE POLYPECTOMY 22528,73126;  Surgeon: Mikie Stock MD;  Location: [facility]    Patient withough preoperative order for iv antibiotic surgical site infection prophylaxis. N/A 2014    Procedure: ESOPHAGOGASTRODUODENOSCOPY, COLONOSCOPY, POSSIBLE BIOPSY, POSSIBLE POLYPECTOMY 09629,17021;  Surgeon: Mikie Stock MD;  Location: [facility]    Radiation left      Spine surgery procedure unlisted      Upper gi endoscopy,biopsy N/A 2014    Procedure: ESOPHAGOGASTRODUODENOSCOPY, COLONOSCOPY, POSSIBLE BIOPSY, POSSIBLE POLYPECTOMY 58191,13476;  Surgeon: Mikie Stock MD;  Location: [facility]    Upper gi endoscopy,biopsy  18= Hiatal hernia. SB Bx normal    Upper gi endoscopy,exam       Family History   Problem Relation Age of Onset    Heart Attack Father     Other (pneumonia) Mother     Other (unknown cause 32) Brother     Breast Cancer Sister 71    Other (COPD) Sister     Breast Cancer Paternal Aunt         80's    Breast Cancer Paternal Cousin Female         early 30's    Breast Cancer Self 68     Social History     Tobacco Use    Smoking status: Former     Current packs/day: 0.00     Average packs/day: 1 pack/day for 20.0 years (20.0 ttl pk-yrs)     Types: Cigarettes     Start date: 10/10/1973     Quit date: 10/10/1993     Years since quittin.2    Smokeless tobacco: Never    Tobacco comments:     Updated 24   Substance Use Topics    Alcohol use: No       SYSTEM Check if Review is Normal Check if Physical Exam is Normal If not normal, please explain:   HEENT [x ] [x ]    NECK & BACK [x ] [x ]    HEART [x ] [x ]    LUNGS [x ] [x ]    ABDOMEN [x ] [x ]    UROGENITAL [x ] [x ]    EXTREMITIES [x ] [x ]    OTHER        [ x ] I have discussed the risks and benefits and alternatives with the patient/family.  They understand and agree to proceed with plan of care.  [ x ] I have  reviewed the History and Physical done within the last 30 days.  Any changes noted above.    Benjamín Fu MD              [1]   Medications Prior to Admission   Medication Sig Dispense Refill Last Dose/Taking    amLODIPine 5 MG Oral Tab Take 1 tablet (5 mg total) by mouth daily. 90 tablet 0     Benazepril HCl 20 MG Oral Tab Take 1 tablet (20 mg total) by mouth daily. 90 tablet 1     DULoxetine 20 MG Oral Cap DR Particles Take 1 capsule (20 mg total) by mouth daily. 90 capsule 1     DULoxetine 60 MG Oral Cap DR Particles Take 1 capsule (60 mg total) by mouth daily. 90 capsule 1     rosuvastatin 5 MG Oral Tab Take 1 tablet (5 mg total) by mouth every evening. 90 tablet 1     hyoscyamine 0.125 MG Sublingual SL Tab Place 1 tablet (125 mcg total) under the tongue every 6 (six) hours as needed for Cramping. 30 tablet 0     Respiratory Therapy Supplies (FULL KIT NEBULIZER SET) Does not apply Misc 1 kit As Directed. 1 each 0     CELECOXIB 200 MG Oral Cap TAKE 1 CAPSULE(200 MG) BY MOUTH DAILY 90 capsule 0     Metoprolol Succinate ER 25 MG Oral Tablet 24 Hr Take 1 tablet (25 mg total) by mouth daily.       Cholecalciferol (VITAMIN D) 1000 units Oral Tab Take by mouth. Daily except when she takes weekly 50,000 dose       Omega-3 Fatty Acids (FISH OIL OR) Take by mouth daily.      [2]   Allergies  Allergen Reactions    Codeine NAUSEA ONLY    Hm Lidocaine Patch [Lidocaine] ITCHING

## 2025-01-16 NOTE — DISCHARGE INSTRUCTIONS
Home Care Instructions Following Your Pain Procedure     Korina,  It has been a pleasure to have you as our patient. To help you at home, you must follow these general discharge instructions. We will review these with you before you are discharged. It is our hope that you have a complete and uneventful recovery from our procedure.     General Instructions:  What to Expect:  Bandages from your procedure today can be removed when you get home.  Please avoid soaking and/or swimming for 24 hours.  Showering is okay  It is normal to have increased pain symptoms and/or pain at injection site for up to 3-5 days after procedure, you can use heat or ice (20 minutes on 20 minutes off) for comfort.  You may experience some temporary side effects which may include restlessness or insomnia, flushing of the face, or heart palpitations.  Please contact the provider if these symptoms do not resolve within 3-4 days.  Lightheadedness or nausea may occur and should resolve within 24 to 48 hours.  If you develop a headache after treatment, rest, drink fluids (with caffeine, if possible) and take mild over-the-counter pain medication.  If the headache does not improve with the above treatment, contact the physician.  Home Medications:  Resume all previously prescribed medication.  Please avoid taking NSAIDs (Non-Steriodal Anti-Inflammatory Drugs) such as:  Ibuprofen ( Advil, Motrin) Aleve (Naproxen), Diclofenac, Meloxicam for 6 hours after procedure.   If you are on Coumadin (Warfarin) or any other anti-coagulant (or \"blood thinning\") medication such as Plavix (Clopidogrel), Xarelto (Rivaroxaban), Eliquis (Apixaban), Effient (Prasugrel) etc., restart on the following day from the procedure unless otherwise directed by your provider.  If you are a diabetic, please increase the frequency of your glucose monitoring after the procedure as steroids may cause a temporary (2-3 day) increase in your blood sugar.  Contact your primary care  physician if your blood sugar remains elevated as you may require some medication adjustment.  Diet:  Resume your regular diet as tolerated.  Activity:  We recommend that you relax and rest during the rest of your procedure day.  If you feel weakness in your arms or legs do not drive.  Follow-up Appointment  Please schedule a follow-up visit within 3 to 4 weeks after your last procedure date.  Question or Concerns:  Feel free to call our office with any questions or concerns at [phone] (option #2)    Korina  Thank you for coming to [facility] for your procedure.  The nurses try very hard to make sure you receive the best care possible.  Your trust in them as well as us is greatly appreciated.    Thanks so much,   Dr. Benjamín Fu

## 2025-01-17 ENCOUNTER — LAB ENCOUNTER (OUTPATIENT)
Dept: LAB | Age: 80
End: 2025-01-17
Attending: FAMILY MEDICINE
Payer: MEDICARE

## 2025-01-17 ENCOUNTER — TELEPHONE (OUTPATIENT)
Dept: PAIN CLINIC | Facility: CLINIC | Age: 80
End: 2025-01-17

## 2025-01-17 DIAGNOSIS — R53.83 FATIGUE: ICD-10-CM

## 2025-01-17 DIAGNOSIS — R06.00 DYSPNEA, PAROXYSMAL NOCTURNAL: ICD-10-CM

## 2025-01-17 DIAGNOSIS — E78.2 HYPERLIPIDEMIA, MIXED: ICD-10-CM

## 2025-01-17 DIAGNOSIS — D50.9 IRON DEFICIENCY ANEMIA, UNSPECIFIED IRON DEFICIENCY ANEMIA TYPE: ICD-10-CM

## 2025-01-17 DIAGNOSIS — R06.02 SHORTNESS OF BREATH: ICD-10-CM

## 2025-01-17 DIAGNOSIS — E78.2 MIXED HYPERLIPIDEMIA: ICD-10-CM

## 2025-01-17 DIAGNOSIS — I10 ESSENTIAL HYPERTENSION: ICD-10-CM

## 2025-01-17 DIAGNOSIS — I10 ESSENTIAL HYPERTENSION, MALIGNANT: ICD-10-CM

## 2025-01-17 DIAGNOSIS — R73.9 BLOOD GLUCOSE ELEVATED: Primary | ICD-10-CM

## 2025-01-17 DIAGNOSIS — R73.9 HYPERGLYCEMIA: ICD-10-CM

## 2025-01-17 LAB
ALBUMIN SERPL-MCNC: 4.4 G/DL (ref 3.2–4.8)
ALBUMIN/GLOB SERPL: 1.4 {RATIO} (ref 1–2)
ALP LIVER SERPL-CCNC: 86 U/L
ALT SERPL-CCNC: 17 U/L
ANION GAP SERPL CALC-SCNC: 12 MMOL/L (ref 0–18)
AST SERPL-CCNC: 25 U/L (ref ?–34)
BASOPHILS # BLD AUTO: 0.06 X10(3) UL (ref 0–0.2)
BASOPHILS NFR BLD AUTO: 0.5 %
BILIRUB SERPL-MCNC: 0.6 MG/DL (ref 0.2–1.1)
BUN BLD-MCNC: 27 MG/DL (ref 9–23)
CALCIUM BLD-MCNC: 10.9 MG/DL (ref 8.7–10.6)
CHLORIDE SERPL-SCNC: 106 MMOL/L (ref 98–112)
CHOLEST SERPL-MCNC: 165 MG/DL (ref ?–200)
CO2 SERPL-SCNC: 21 MMOL/L (ref 21–32)
CREAT BLD-MCNC: 1.12 MG/DL
DEPRECATED HBV CORE AB SER IA-ACNC: 31 NG/ML
EGFRCR SERPLBLD CKD-EPI 2021: 49 ML/MIN/1.73M2 (ref 60–?)
EOSINOPHIL # BLD AUTO: 0.01 X10(3) UL (ref 0–0.7)
EOSINOPHIL NFR BLD AUTO: 0.1 %
ERYTHROCYTE [DISTWIDTH] IN BLOOD BY AUTOMATED COUNT: 14.1 %
EST. AVERAGE GLUCOSE BLD GHB EST-MCNC: 108 MG/DL (ref 68–126)
FASTING PATIENT LIPID ANSWER: YES
FASTING STATUS PATIENT QL REPORTED: YES
GLOBULIN PLAS-MCNC: 3.1 G/DL (ref 2–3.5)
GLUCOSE BLD-MCNC: 104 MG/DL (ref 70–99)
HBA1C MFR BLD: 5.4 % (ref ?–5.7)
HCT VFR BLD AUTO: 44.2 %
HDLC SERPL-MCNC: 51 MG/DL (ref 40–59)
HGB BLD-MCNC: 14.9 G/DL
IMM GRANULOCYTES # BLD AUTO: 0.04 X10(3) UL (ref 0–1)
IMM GRANULOCYTES NFR BLD: 0.3 %
IRON SATN MFR SERPL: 24 %
IRON SERPL-MCNC: 91 UG/DL
LDLC SERPL CALC-MCNC: 93 MG/DL (ref ?–100)
LYMPHOCYTES # BLD AUTO: 1.89 X10(3) UL (ref 1–4)
LYMPHOCYTES NFR BLD AUTO: 14.5 %
MCH RBC QN AUTO: 31.4 PG (ref 26–34)
MCHC RBC AUTO-ENTMCNC: 33.7 G/DL (ref 31–37)
MCV RBC AUTO: 93.1 FL
MONOCYTES # BLD AUTO: 0.59 X10(3) UL (ref 0.1–1)
MONOCYTES NFR BLD AUTO: 4.5 %
NEUTROPHILS # BLD AUTO: 10.48 X10 (3) UL (ref 1.5–7.7)
NEUTROPHILS # BLD AUTO: 10.48 X10(3) UL (ref 1.5–7.7)
NEUTROPHILS NFR BLD AUTO: 80.1 %
NONHDLC SERPL-MCNC: 114 MG/DL (ref ?–130)
NT-PROBNP SERPL-MCNC: 181 PG/ML (ref ?–450)
OSMOLALITY SERPL CALC.SUM OF ELEC: 293 MOSM/KG (ref 275–295)
PLATELET # BLD AUTO: 300 10(3)UL (ref 150–450)
POTASSIUM SERPL-SCNC: 4.1 MMOL/L (ref 3.5–5.1)
PROT SERPL-MCNC: 7.5 G/DL (ref 5.7–8.2)
RBC # BLD AUTO: 4.75 X10(6)UL
SODIUM SERPL-SCNC: 139 MMOL/L (ref 136–145)
TOTAL IRON BINDING CAPACITY: 376 UG/DL (ref 250–425)
TRANSFERRIN SERPL-MCNC: 298 MG/DL (ref 250–380)
TRIGL SERPL-MCNC: 116 MG/DL (ref 30–149)
TSI SER-ACNC: 1.66 UIU/ML (ref 0.55–4.78)
VLDLC SERPL CALC-MCNC: 19 MG/DL (ref 0–30)
WBC # BLD AUTO: 13.1 X10(3) UL (ref 4–11)

## 2025-01-17 PROCEDURE — 83036 HEMOGLOBIN GLYCOSYLATED A1C: CPT

## 2025-01-17 PROCEDURE — 36415 COLL VENOUS BLD VENIPUNCTURE: CPT

## 2025-01-17 PROCEDURE — 83550 IRON BINDING TEST: CPT

## 2025-01-17 PROCEDURE — 83540 ASSAY OF IRON: CPT

## 2025-01-17 PROCEDURE — 83880 ASSAY OF NATRIURETIC PEPTIDE: CPT

## 2025-01-17 PROCEDURE — 80053 COMPREHEN METABOLIC PANEL: CPT

## 2025-01-17 PROCEDURE — 82728 ASSAY OF FERRITIN: CPT

## 2025-01-17 PROCEDURE — 84443 ASSAY THYROID STIM HORMONE: CPT

## 2025-01-17 PROCEDURE — 80061 LIPID PANEL: CPT

## 2025-01-17 PROCEDURE — 85025 COMPLETE CBC W/AUTO DIFF WBC: CPT

## 2025-01-17 NOTE — TELEPHONE ENCOUNTER
Taylor called placed to patient for post procedure follow up. Patient stated feeling fine. Informed patient that soreness is to be expected after the procedure. Educated patient that it takes 3-5 days for the steroid to be effective and to allow adequate time for medication to work. Encouraged patient to alternate ice and heat and to take medications as prescribed. Pt verbalized understanding to call with any questions or concerns.      Procedure: BILATERAL GENICULAR NERVE RFA WITH LOCAL   Date: 01/16/25  Follow up Visit Scheduled:

## 2025-01-21 DIAGNOSIS — D72.829 LEUKOCYTOSIS, UNSPECIFIED TYPE: Primary | ICD-10-CM

## 2025-01-29 ENCOUNTER — RT VISIT (OUTPATIENT)
Dept: RESPIRATORY THERAPY | Facility: HOSPITAL | Age: 80
End: 2025-01-29
Attending: INTERNAL MEDICINE
Payer: MEDICARE

## 2025-01-29 DIAGNOSIS — R06.02 SHORTNESS OF BREATH: ICD-10-CM

## 2025-01-29 PROCEDURE — 94010 BREATHING CAPACITY TEST: CPT | Performed by: INTERNAL MEDICINE

## 2025-01-29 PROCEDURE — 94726 PLETHYSMOGRAPHY LUNG VOLUMES: CPT | Performed by: INTERNAL MEDICINE

## 2025-01-29 PROCEDURE — 94729 DIFFUSING CAPACITY: CPT | Performed by: INTERNAL MEDICINE

## 2025-01-30 ENCOUNTER — TELEPHONE (OUTPATIENT)
Dept: FAMILY MEDICINE CLINIC | Facility: CLINIC | Age: 80
End: 2025-01-30

## 2025-01-30 ENCOUNTER — PATIENT OUTREACH (OUTPATIENT)
Dept: CASE MANAGEMENT | Age: 80
End: 2025-01-30

## 2025-01-30 DIAGNOSIS — I25.10 CORONARY ARTERY DISEASE INVOLVING NATIVE CORONARY ARTERY OF NATIVE HEART, UNSPECIFIED WHETHER ANGINA PRESENT: ICD-10-CM

## 2025-01-30 DIAGNOSIS — M17.0 PRIMARY OSTEOARTHRITIS OF BOTH KNEES: ICD-10-CM

## 2025-01-30 DIAGNOSIS — I10 HYPERTENSION, UNSPECIFIED TYPE: ICD-10-CM

## 2025-01-30 DIAGNOSIS — F41.9 ANXIETY: ICD-10-CM

## 2025-01-30 DIAGNOSIS — E66.01 OBESITY, MORBID (HCC): ICD-10-CM

## 2025-01-30 DIAGNOSIS — F32.A DEPRESSION, UNSPECIFIED DEPRESSION TYPE: Primary | ICD-10-CM

## 2025-01-30 DIAGNOSIS — E78.2 HYPERLIPIDEMIA, MIXED: ICD-10-CM

## 2025-01-30 NOTE — PROGRESS NOTES
Spoke to Korina for Chronic Care Management.      Updates to patient care team/comments: None    Patient reported changes in medications: None    Med Adherence  Comment: Patient reports taking all medications as directed.    Patient updates/concerns:     Patient states that she is doing well overall. Does admit to feeling a bit overwhelmed with many doctors appointments and tests.     Patient reviewed with CCM that she recently had bilateral genicular nerve radiofrequency ablation with Dr. Fu on 1/16. Per patient-she is able to ambulate more comfortably around her home after procedure, but states that she will have pain if walking longer distances. Patient states that she is unable to tolerate walking in [place].     Patient reports that she continues with shortness of breath after activity. Per patient-rarely happens at home, only when walking longer distances. She will usually have to rest and catch her breath, patient reports that she usually recovers within 1 minute.  Yesterday had pulmonary function test and plans to schedule follow up appointment with pulmonology to discuss results.     Patient reviews with CCM that she is unsure if she will proceed with workup ordered by her Cardiologist-cardiac CT for calcium scoring. CCM verbally educated patient on the importance and purpose of completing this test.     Patient with intermittent abdominal cramping, she states that PCP did recently give order for hyoscyamine 0.125 MG, however was not covered by insurance. Patient states that she is unsure why and verbalizes okay for CCM to reach out to pharmacy for assistance.       Goals/Action Plan:    Active goal from previous outreach: Patient to continue to work on diet and increase physical activity as tolerated to achieve weight loss.         Patient reported progress towards goals: Patient reports that she continues to work on her diet to achieve weight loss. Patient states that she is no longer drinking coffee or  consuming sugar. She is reading nutrition labels and trying her best to make healthy choices.                - What: Patient will continue to go for walks as tolerated            - Where/When/How: Minimum 2-3x per week at grocery store, shopping center, in her neighborhood.    Patient Reported Barriers and Concerns: N/A                   - Plan for overcoming barriers: CCM encouraged patient to call as needed with any questions or concerns.     Care Managers Interventions:     CCM verbally educated patient on importance and purpose of cardiac CT for calcium scoring.   Continue to provide encouragement, support and education for healthy coping and dx.      Call to [facility] in [place], spoke with Deonte regarding prescription for hyoscyamine 0.125 MG Sublingual SL Tab that was sent over on 12/13. Per Deonte, drug is not covered by patient's insurance-their system is not giving reason. CCM will send TE to PCP office requesting alternative.    Future Appointments:   Future Appointments   Date Time Provider Department Center   2/21/2025  1:00 PM [facility] CT MAIN RM[facility]  [facility]   3/3/2025 12:30 PM Laurie Espinoza MD [facility] [id number] [facility]         Next Care Manager Follow Up Date: 1 Month     Reason For Follow Up: review progress and or barriers towards patient's goals.     Time Spent This Encounter Total: 37 min medical record review, telephone communication, care plan updates where needed, education, goals, and action plan recreation/update. Provided acknowledgment and validation to patient's concerns.   Monthly Minute Total including today: 37 Minutes  Physical assessment, complete health history, and need for CCM established by Hanna Aveyr MD.

## 2025-01-31 RX ORDER — DICYCLOMINE HYDROCHLORIDE 10 MG/1
CAPSULE ORAL
Qty: 40 CAPSULE | Refills: 0 | Status: SHIPPED | OUTPATIENT
Start: 2025-01-31

## 2025-01-31 NOTE — TELEPHONE ENCOUNTER
We can try dicyclomine 10 mg 1 tablet every 6 hours as needed.  Number 40 tablets no refills.  Thanks

## 2025-01-31 NOTE — TELEPHONE ENCOUNTER
Call to [facility] in [place], spoke with Deonte regarding prescription for hyoscyamine 0.125 MG Sublingual SL Tab that was sent over on 12/13. Per Deonte, drug is not covered by patient's insurance-their system is not giving reason. Patient is requesting alternative, please advise.

## 2025-02-03 NOTE — PROCEDURES
Pulmonary Function Test:     Ward Mosquera is a 79 year old female who presents for evaluation of shortness of breath      Findings:  Spirometry: FEV1 is 1.44 L, 70% predicted.  With a Z-score of -1.54 FVC is 1.79 L, 65% predicted and FEV1/ FVC ratio is 0.80 or 0.44.  The flow-volume loop demonstrates a restrictive pattern.   Lung Volumes:                                                                     The TLC is 3.82 L, 73% predicted.  With a Z-score of -1.96  The residual volume 2.03 L, 87% predicted.  Diffusion Capacity:  The diffusion capacity is 13.9 or 73% predicted with a Z-score of -1.69 and 130% predicted when corrected for alveolar volume.     Impression:  There is mild airway restrictive process  on spirometry and visualized on flow-volume loop.    Lung volumes show mild restriction With a Z-score of -1.96 this can be seen in heart failure, fluid overload states, interstitial lung disease, thoracic spine/chest disorders, obesity as well as other clinical scenarios.  Further clinical correlation required. ERV is reduced likely due to chest wall restriction with obesity      Diffusion capacity is mildly reduced  improves to normal range when considering alveolar volume. This may represent a normal finding but can be seen in interstitial lung disease, pulmonary vascular disease (such as pulmonary hypertension), atelectasis and anemia. If not already performed, would suggest further evaluation as   Compared to the study performed on 12/13/2017 there is slight improvement in FEV1,and FVC  DLCO diffusion capacity has significantly improved (previously DLCO 36% predicted).    Disclaimer: This PFT has been interpreted in accordance to ATS/ERS interpretation guidelines 2022, with the use of upper and lower limits of normal as well as z-score references. Previous testing (before March 2024) was not performed at [facility] using z-scores and so comparison to previous testing should be taken with  caution.    Frank Hernandez MD

## 2025-02-09 DIAGNOSIS — M25.562 CHRONIC PAIN OF LEFT KNEE: ICD-10-CM

## 2025-02-09 DIAGNOSIS — I10 ESSENTIAL HYPERTENSION: ICD-10-CM

## 2025-02-09 DIAGNOSIS — G89.29 CHRONIC PAIN OF RIGHT KNEE: ICD-10-CM

## 2025-02-09 DIAGNOSIS — M25.561 CHRONIC PAIN OF RIGHT KNEE: ICD-10-CM

## 2025-02-09 DIAGNOSIS — G89.29 CHRONIC PAIN OF LEFT KNEE: ICD-10-CM

## 2025-02-13 RX ORDER — CELECOXIB 200 MG/1
200 CAPSULE ORAL DAILY
Qty: 90 CAPSULE | Refills: 0 | Status: SHIPPED | OUTPATIENT
Start: 2025-02-13

## 2025-02-13 RX ORDER — AMLODIPINE BESYLATE 5 MG/1
5 TABLET ORAL DAILY
Qty: 90 TABLET | Refills: 0 | Status: SHIPPED | OUTPATIENT
Start: 2025-02-13

## 2025-02-13 NOTE — TELEPHONE ENCOUNTER
Last Office Visit: 12/13/2024    Last Refill:   Medication Quantity Refills Start End   CELECOXIB 200 MG Oral Cap 90 capsule 0 11/4/2024 --     Medication Quantity Refills Start End   amLODIPine 5 MG Oral Tab 90 tablet 0 12/13/2024 --     Return to Clinic: 06/13/2025    Protocol: failed and n/a    Refill pended. Please approve if okay. Thank you.

## 2025-02-19 ENCOUNTER — OFFICE VISIT (OUTPATIENT)
Dept: PAIN CLINIC | Facility: CLINIC | Age: 80
End: 2025-02-19
Payer: MEDICARE

## 2025-02-19 VITALS
OXYGEN SATURATION: 93 % | SYSTOLIC BLOOD PRESSURE: 128 MMHG | DIASTOLIC BLOOD PRESSURE: 76 MMHG | BODY MASS INDEX: 38 KG/M2 | HEART RATE: 98 BPM | WEIGHT: 236 LBS

## 2025-02-19 DIAGNOSIS — M17.0 PRIMARY OSTEOARTHRITIS OF BOTH KNEES: Primary | ICD-10-CM

## 2025-02-19 PROCEDURE — 99213 OFFICE O/P EST LOW 20 MIN: CPT | Performed by: ANESTHESIOLOGY

## 2025-02-19 PROCEDURE — G2211 COMPLEX E/M VISIT ADD ON: HCPCS | Performed by: ANESTHESIOLOGY

## 2025-02-19 NOTE — PROGRESS NOTES
Name: Korina Mosquera   : 1945   DOS: 2025     Pain Clinic Follow Up Visit:     Chief Complaint   Patient presents with    Procedure Follow Up     BILATERAL GENICULAR NERVE RFA WITH LOCAL       Korina Mosquera is a 79 year old female with a history advanced osteoarthritis of both knees here for follow-up.  The patient is status post bilateral genicular nerve RFA.  Reports greater than 50% improvement in symptoms.  Overall, very happy with results of the procedure.  Denies any complications.    Pt denies any chills, fever, or weakness. There is no bladder or bowel incontinence associated with the pain.    REVIEW OF SYSTEMS:  A ten point review of systems was performed with pertinent positives and negatives in the HPI.    Allergies[1]    Current Outpatient Medications   Medication Sig Dispense Refill    CELECOXIB 200 MG Oral Cap TAKE 1 CAPSULE(200 MG) BY MOUTH DAILY 90 capsule 0    AMLODIPINE 5 MG Oral Tab TAKE 1 TABLET(5 MG) BY MOUTH DAILY 90 tablet 0    dicyclomine 10 MG Oral Cap Take 1 tablet every 6 hours as needed. 40 capsule 0    Benazepril HCl 20 MG Oral Tab Take 1 tablet (20 mg total) by mouth daily. 90 tablet 1    DULoxetine 20 MG Oral Cap DR Particles Take 1 capsule (20 mg total) by mouth daily. 90 capsule 1    DULoxetine 60 MG Oral Cap DR Particles Take 1 capsule (60 mg total) by mouth daily. 90 capsule 1    rosuvastatin 5 MG Oral Tab Take 1 tablet (5 mg total) by mouth every evening. 90 tablet 1    Respiratory Therapy Supplies (FULL KIT NEBULIZER SET) Does not apply Misc 1 kit As Directed. 1 each 0    Metoprolol Succinate ER 25 MG Oral Tablet 24 Hr Take 1 tablet (25 mg total) by mouth daily.      Cholecalciferol (VITAMIN D) 1000 units Oral Tab Take by mouth. Daily except when she takes weekly 50,000 dose      Omega-3 Fatty Acids (FISH OIL OR) Take by mouth daily.           EXAM:   /76   Pulse 98   Wt 236 lb (107 kg)   SpO2 93%   BMI 38.09 kg/m²   General:  Patient is  a(n) 79 year old year old female in no acute distress.  Neurologic:: WNL-Orientation to time, place and person, normal mood & affect, concentration & attention span intact.   Inspection:  Ambulates with well-coordinated, fluid, non-antalgic gait.  Gait is normal.  Neck: Full range of motion  Back: Gait intact  Skin: Warm, dry  Respiratory: Speech is nonlabored  IMAGES:     Knee x-ray with severe tricompartmental disease    ASSESSMENT AND PLAN:     1. Primary osteoarthritis of both knees      The patient is a pleasant 79-year-old female with a history of advanced osteoarthritis of both knees.  Does have severe tricompartmental knees.  Not ideal surgical candidate for total knee arthroplasty due to underlying comorbidities.  Therefore referred here for genicular nerve block and ultimately radiofrequency ablation.  Patient has done well with genicular nerve RFA with greater than 50% relief which is current and sustained.  Denies any complications.  Discussed with patient that this procedure can be repeated in 6 months if needed.  Patient voiced understanding.      Radiology orders and consultations:None  The patient indicates understanding of these issues and agrees to the plan.  No follow-ups on file.    Benjamín Fu MD, 2/19/2025, 2:37 PM              [1]   Allergies  Allergen Reactions    Codeine NAUSEA ONLY    Hm Lidocaine Patch [Lidocaine] ITCHING

## 2025-02-19 NOTE — IMAGING NOTE
Call placed to pt at this time.  Pt advised to arrive at 1215.  Pt advised to take HR control meds as ordered by their ordering physician night before by midnight and 1 hour prior to arrival at 1115.  Pt may eat a light meal prior to scan and to drink plenty of extra water the day before.   Pt advised to refrain from coffees and teas including those labeled as decaf and pop as well as chocolate for 12 hours prior to scan.  Pt advised to take regular medications as usual.

## 2025-02-19 NOTE — PATIENT INSTRUCTIONS
Refill policies:    Allow 2-3 business days for refills; controlled substances may take longer.  Contact your pharmacy at least 5 days prior to running out of medication and have them send an electronic request or submit request through the “request refill” option in your [employer] account.  Refills are not addressed on weekends; covering physicians do not authorize routine medications on weekends.  No narcotics or controlled substances are refilled after noon on Fridays or by on call physicians.  By law, narcotics must be electronically prescribed.  A 30 day supply with no refills is the maximum allowed.  If your prescription is due for a refill, you may be due for a follow up appointment.  To best provide you care, patients receiving routine medications need to be seen at least once a year.  Patients receiving narcotic/controlled substance medications need to be seen at least once every 3 months.  In the event that your preferred pharmacy does not have the requested medication in stock (e.g. Backordered), it is your responsibility to find another pharmacy that has the requested medication available.  We will gladly send a new prescription to that pharmacy at your request.    Scheduling Tests:    If your physician has ordered radiology tests such as MRI or CT scans, please contact Central Scheduling at [phone] right away to schedule the test.  Once scheduled, the [facility] Centralized Referral Team will work with your insurance carrier to obtain pre-certification or prior authorization.  Depending on your insurance carrier, approval may take 3-10 days.  It is highly recommended patients assure they have received an authorization before having a test performed.  If test is done without insurance authorization, patient may be responsible for the entire amount billed.      Precertification and Prior Authorizations:  If your physician has recommended that you have a procedure or additional testing performed the [facility]  Centralized Referral Team will contact your insurance carrier to obtain pre-certification or prior authorization.    You are strongly encouraged to contact your insurance carrier to verify that your procedure/test has been approved and is a COVERED benefit.  Although the [facility] Centralized Referral Team does its due diligence, the insurance carrier gives the disclaimer that \"Although the procedure is authorized, this does not guarantee payment.\"    Ultimately the patient is responsible for payment.   Thank you for your understanding in this matter.  Paperwork Completion:  If you require FMLA or disability paperwork for your recovery, please make sure to either drop it off or have it faxed to our office at [phone]. Be sure the form has your name and date of birth on it.  The form will be faxed to our Forms Department and they will complete it for you.  There is a 25$ fee for all forms that need to be filled out.  Please be aware there is a 10-14 day turnaround time.  You will need to sign a release of information (RAMAN) form if your paperwork does not come with one.  You may call the Forms Department with any questions at [phone].  Their fax number is [phone].

## 2025-02-19 NOTE — PROGRESS NOTES
Last procedure: BILATERAL GENICULAR NERVE RFA WITH LOCAL   Date: 01/16/25  Percentage of relief obtained: more than 50%  Duration of relief: current    Current Pain Score: 3 not constant

## 2025-02-21 ENCOUNTER — HOSPITAL ENCOUNTER (OUTPATIENT)
Dept: CT IMAGING | Facility: HOSPITAL | Age: 80
Discharge: HOME | End: 2025-02-21
Attending: INTERNAL MEDICINE
Payer: MEDICARE

## 2025-02-21 VITALS — HEART RATE: 49 BPM | SYSTOLIC BLOOD PRESSURE: 101 MMHG | RESPIRATION RATE: 19 BRPM | DIASTOLIC BLOOD PRESSURE: 55 MMHG

## 2025-02-21 DIAGNOSIS — R06.09 DOE (DYSPNEA ON EXERTION): ICD-10-CM

## 2025-02-21 DIAGNOSIS — I25.10 CAD (CORONARY ARTERY DISEASE): ICD-10-CM

## 2025-02-21 LAB
CREAT BLD-MCNC: 1.2 MG/DL
EGFRCR SERPLBLD CKD-EPI 2021: 45 ML/MIN/1.73M2 (ref 60–?)

## 2025-02-21 PROCEDURE — 82565 ASSAY OF CREATININE: CPT

## 2025-02-21 PROCEDURE — 75574 CT ANGIO HRT W/3D IMAGE: CPT | Performed by: INTERNAL MEDICINE

## 2025-02-21 RX ORDER — METOPROLOL TARTRATE 1 MG/ML
5 INJECTION, SOLUTION INTRAVENOUS SEE ADMIN INSTRUCTIONS
Status: DISCONTINUED | OUTPATIENT
Start: 2025-02-21 | End: 2025-02-23

## 2025-02-21 RX ORDER — METOPROLOL TARTRATE 1 MG/ML
INJECTION, SOLUTION INTRAVENOUS
Status: COMPLETED
Start: 2025-02-21 | End: 2025-02-21

## 2025-02-21 RX ORDER — NITROGLYCERIN 0.4 MG/1
TABLET SUBLINGUAL
Status: COMPLETED
Start: 2025-02-21 | End: 2025-02-21

## 2025-02-21 RX ORDER — METOPROLOL TARTRATE 50 MG
100 TABLET ORAL ONCE
Status: DISCONTINUED | OUTPATIENT
Start: 2025-02-21 | End: 2025-02-23

## 2025-02-21 RX ORDER — NITROGLYCERIN 0.4 MG/1
0.4 TABLET SUBLINGUAL EVERY 5 MIN PRN
Status: DISCONTINUED | OUTPATIENT
Start: 2025-02-21 | End: 2025-02-23

## 2025-02-21 RX ORDER — DILTIAZEM HYDROCHLORIDE 5 MG/ML
5 INJECTION INTRAVENOUS SEE ADMIN INSTRUCTIONS
Status: DISCONTINUED | OUTPATIENT
Start: 2025-02-21 | End: 2025-02-23

## 2025-02-21 RX ORDER — DILTIAZEM HYDROCHLORIDE 5 MG/ML
INJECTION INTRAVENOUS
Status: COMPLETED
Start: 2025-02-21 | End: 2025-02-21

## 2025-02-21 RX ADMIN — DILTIAZEM HYDROCHLORIDE 5 MG: 5 INJECTION INTRAVENOUS at 13:09:00

## 2025-02-21 RX ADMIN — METOPROLOL TARTRATE 5 MG: 1 INJECTION, SOLUTION INTRAVENOUS at 12:49:00

## 2025-02-21 RX ADMIN — METOPROLOL TARTRATE 5 MG: 1 INJECTION, SOLUTION INTRAVENOUS at 12:59:00

## 2025-02-21 RX ADMIN — NITROGLYCERIN 0.4 MG: 0.4 TABLET SUBLINGUAL at 13:45:00

## 2025-02-21 RX ADMIN — DILTIAZEM HYDROCHLORIDE 5 MG: 5 INJECTION INTRAVENOUS at 12:45:00

## 2025-02-21 RX ADMIN — DILTIAZEM HYDROCHLORIDE 5 MG: 5 INJECTION INTRAVENOUS at 13:04:00

## 2025-02-21 RX ADMIN — METOPROLOL TARTRATE 5 MG: 1 INJECTION, SOLUTION INTRAVENOUS at 12:54:00

## 2025-02-21 RX ADMIN — METOPROLOL TARTRATE 5 MG: 1 INJECTION, SOLUTION INTRAVENOUS at 12:44:00

## 2025-02-21 NOTE — IMAGING NOTE
Korina Mosquera to CT Rm 4.   Denies use of long acting nitrates like, Imdur, Cialis, Levitra and Viagra.   O2 applied via nasal cannula @ 2LPM.  Procedure explained and questions answered.   GFR = 45  Contrast injected at 1350  Contrast injection = 80ml  0.9 NS flush = 100ml  Average HR = 52bpm    Patient tolerated the procedure without complication. Denies any contrast reaction.    Escorted pt back to Radiology holding to complete her post procedure monitoring, SBAR given to holding RUPALI Lan, discharged from CT in stable condition.

## 2025-02-21 NOTE — IMAGING NOTE
Received patient s/p CT gated coronary. Patient received medications for HR control for scan. Patient monitored for 30 minutes as per post-procedure monitoring protocol. Patient discharged in stable condition by RUPAIL Jaramillo.

## 2025-02-26 ENCOUNTER — PATIENT OUTREACH (OUTPATIENT)
Dept: CASE MANAGEMENT | Age: 80
End: 2025-02-26

## 2025-02-26 DIAGNOSIS — I25.10 CORONARY ARTERY DISEASE INVOLVING NATIVE CORONARY ARTERY OF NATIVE HEART, UNSPECIFIED WHETHER ANGINA PRESENT: Primary | ICD-10-CM

## 2025-02-26 DIAGNOSIS — I10 HYPERTENSION, UNSPECIFIED TYPE: ICD-10-CM

## 2025-02-26 DIAGNOSIS — M17.0 PRIMARY OSTEOARTHRITIS OF BOTH KNEES: ICD-10-CM

## 2025-02-26 DIAGNOSIS — R06.09 DOE (DYSPNEA ON EXERTION): ICD-10-CM

## 2025-02-26 DIAGNOSIS — F41.9 ANXIETY: ICD-10-CM

## 2025-02-26 DIAGNOSIS — E78.2 HYPERLIPIDEMIA, MIXED: ICD-10-CM

## 2025-02-26 NOTE — PROGRESS NOTES
Spoke to Korina for Chronic Care Management.      Updates to patient care team/comments: None    Patient reported changes in medications: Patient states not taking dicyclomine as it cause her to have diarrhea. [facility] will remove form medication list and send TE to PCP to notify.     Med Adherence  Comment: Patient reports taking all medications as directed.    Patient updates/concerns:     Patient reports continued shortness of breath upon exertion. Patient had pulmonary function test done on 1/29 and will plan to follow with Pulmonology on 3/3. Patient had CT gated coronary artery with calcium scoring done on 2/21. Patient states that she has not heard from Cardiology to discuss results. Patient stated that she called her Cardiology office to schedule an appointment to discuss results and was told she already had an appointment in April for follow up.    Per chart review-patient had abnormal results, she ideally would be seen by Cardiology prior to April appointment. Patient agreeable to [facility] contacting her Cardiologist for clarification on follow up.     Patient with Osteoarthritis of the knees, recently had bilateral genicular nerve radiofrequency ablation with Dr. Fu on 1/16. Patient reports that her pain is much more manageable than previous. Patient does report increased pain with increased activity, however notes that it is manageable-patient states she is able to get out of her house more and do things like go to the grocery store.     Patient reports that she continues to experience mild intermittent abdominal pain and cramping. Is no longer taking dicyclomine as it caused her to have diarrhea. Patient states no longer interested in addressing this as it has become manageable without taking medication. CCM will remove from medication list and notify PCP.       Goals/Action Plan:    Active goal from previous outreach: Patient to continue to work on diet and increase physical activity as tolerated to achieve  weight loss.         Patient reported progress towards goals: Patient reports that she continues to work on her diet to achieve weight loss.                - What: Patient will continue to go for walks as tolerated            - Where/When/How: Minimum 2-3x per week at grocery store, shopping center, in her neighborhood.    Patient Reported Barriers and Concerns: N/A                   - Plan for overcoming barriers: CCM encouraged patient to call as needed with any questions or concerns.       Care Managers Interventions:    Call to [facility], spoke with Lorena. Lorena confirms that a nurse practitioner reviewed the results and will be contacting patient soon to review. Per Lorena, patient will most likely need to be seen sooner than April due to abnormal result.    TE to PCP to inform that patient discontinued dicyclomine.     CCM to continue to provide encouragement, support and education for healthy coping and dx.        Future Appointments:     Future Appointments   Date Time Provider Department Center   3/3/2025 12:30 PM Laurie Espinoza MD [facility] [id number] [facility] Care Manager Follow Up Date: 1 Month     Reason For Follow Up: review progress and or barriers towards patient's goals.     Time Spent This Encounter Total: 43 min medical record review, telephone communication, care plan updates where needed, education, goals, and action plan recreation/update. Provided acknowledgment and validation to patient's concerns.   Monthly Minute Total including today: 43 Minutes  Physical assessment, complete health history, and need for CCM established by Hanna Avery MD.

## 2025-02-27 ENCOUNTER — LAB ENCOUNTER (OUTPATIENT)
Dept: LAB | Age: 80
End: 2025-02-27
Attending: FAMILY MEDICINE
Payer: MEDICARE

## 2025-02-27 DIAGNOSIS — D72.829 LEUKOCYTOSIS, UNSPECIFIED TYPE: ICD-10-CM

## 2025-02-27 LAB
BASOPHILS # BLD AUTO: 0.09 X10(3) UL (ref 0–0.2)
BASOPHILS NFR BLD AUTO: 1 %
EOSINOPHIL # BLD AUTO: 0.29 X10(3) UL (ref 0–0.7)
EOSINOPHIL NFR BLD AUTO: 3.1 %
ERYTHROCYTE [DISTWIDTH] IN BLOOD BY AUTOMATED COUNT: 14.4 %
HCT VFR BLD AUTO: 43.9 %
HGB BLD-MCNC: 14.2 G/DL
IMM GRANULOCYTES # BLD AUTO: 0.02 X10(3) UL (ref 0–1)
IMM GRANULOCYTES NFR BLD: 0.2 %
LYMPHOCYTES # BLD AUTO: 2.58 X10(3) UL (ref 1–4)
LYMPHOCYTES NFR BLD AUTO: 27.8 %
MCH RBC QN AUTO: 30.7 PG (ref 26–34)
MCHC RBC AUTO-ENTMCNC: 32.3 G/DL (ref 31–37)
MCV RBC AUTO: 95 FL
MONOCYTES # BLD AUTO: 0.81 X10(3) UL (ref 0.1–1)
MONOCYTES NFR BLD AUTO: 8.7 %
NEUTROPHILS # BLD AUTO: 5.5 X10 (3) UL (ref 1.5–7.7)
NEUTROPHILS # BLD AUTO: 5.5 X10(3) UL (ref 1.5–7.7)
NEUTROPHILS NFR BLD AUTO: 59.2 %
PLATELET # BLD AUTO: 291 10(3)UL (ref 150–450)
RBC # BLD AUTO: 4.62 X10(6)UL
WBC # BLD AUTO: 9.3 X10(3) UL (ref 4–11)

## 2025-02-27 PROCEDURE — 36415 COLL VENOUS BLD VENIPUNCTURE: CPT

## 2025-02-27 PROCEDURE — 85025 COMPLETE CBC W/AUTO DIFF WBC: CPT

## 2025-03-04 ENCOUNTER — MED REC SCAN ONLY (OUTPATIENT)
Facility: CLINIC | Age: 80
End: 2025-03-04

## 2025-03-18 ENCOUNTER — OFFICE VISIT (OUTPATIENT)
Facility: CLINIC | Age: 80
End: 2025-03-18
Payer: MEDICARE

## 2025-03-18 VITALS — HEIGHT: 66 IN | RESPIRATION RATE: 16 BRPM | OXYGEN SATURATION: 95 % | HEART RATE: 96 BPM | BODY MASS INDEX: 38 KG/M2

## 2025-03-18 DIAGNOSIS — R06.02 SHORTNESS OF BREATH: Primary | ICD-10-CM

## 2025-03-18 PROCEDURE — 99213 OFFICE O/P EST LOW 20 MIN: CPT | Performed by: INTERNAL MEDICINE

## 2025-03-18 RX ORDER — ROSUVASTATIN CALCIUM 10 MG/1
TABLET, COATED ORAL
COMMUNITY
Start: 2025-03-07

## 2025-03-18 NOTE — PROGRESS NOTES
[facility] Pulmonary Follow Up Note    Chief Complaint:  Chief Complaint   Patient presents with    Follow - Up     Here for a f/u on Pft results.       History of Present Illness:  History of Present Illness  Ward Mosquera is an 79 year old female who presents with ineffective response to inhaler treatments.    She has been experiencing shortness of breath and has tried using Duoneb and albuterol inhalers without any improvement in her symptoms. After starting Duoneb, she began coughing, and neither medication provided relief for her shortness of breath.    She has undergone spirometry testing, with the most recent test on January 29th showing a slight improvement compared to previous results. However, her total lung capacity is slightly diminished, and her residual volume is significantly low, which can be associated with obesity.    She discusses her diet, stating that she eats healthily, avoids sugar, and primarily drinks water and iced tea with Stevia. She denies consuming frozen foods, soda, or dairy products. Despite her efforts, she is frustrated that her dietary habits have not led to weight loss. No heart irregularities, although she mentions having 'a little bit of plaque here and there.'         Past Medical History:   Past Medical History:    Anxiety    Blood disorder    Anemia    Breast CA (HCC)    Breast cancer (HCC)    Her-2 positive    Breast cancer (HCC)    Her-2 positive    Breast cancer (HCC)    Her-2 positive    Cancer (HCC)    Depression    Exposure to medical diagnostic radiation    last tx 2015    Hearing impairment    hard of hearing, no hearing aids    High blood pressure    High cholesterol    Hyperlipidemia    IBS (irritable bowel syndrome)    Osteoarthritis    Personal history of antineoplastic chemotherapy    last tx 2015    Shortness of breath    Unspecified essential hypertension    Visual impairment    Readers        Past Surgical History:   Past Surgical History:   Procedure Laterality  Date    Back surgery  1970    slipped disc    Capsule endoscopy - internal referral  1/24/18= Normal    Chemotherapy  2014    Colon ca scrn not hi rsk ind N/A 12/11/2014    Procedure: ESOPHAGOGASTRODUODENOSCOPY, COLONOSCOPY, POSSIBLE BIOPSY, POSSIBLE POLYPECTOMY 20071,52904;  Surgeon: Mikie Stock MD;  Location: [facility]    Colonoscopy  1/9/18= Hemorrhoids    Repeat PRN    Colonoscopy N/A 01/09/2018    Procedure: COLONOSCOPY;  Surgeon: Onur Levi MD;  Location:  ENDOSCOPY    Lumpectomy left  05/2014    IDC -done at [facility]    Raymond biopsy stereo nodule 1 site right (cpt=19081)  03/2015    benign    Raymond biopsy stereo nodule 1 site right (cpt=19081)  02/2022    radial scars    Patient documented not to have experienced any of the following events N/A 12/11/2014    Procedure: ESOPHAGOGASTRODUODENOSCOPY, COLONOSCOPY, POSSIBLE BIOPSY, POSSIBLE POLYPECTOMY 66614,62212;  Surgeon: Mikie Stock MD;  Location: [facility]    Patient withough preoperative order for iv antibiotic surgical site infection prophylaxis. N/A 12/11/2014    Procedure: ESOPHAGOGASTRODUODENOSCOPY, COLONOSCOPY, POSSIBLE BIOPSY, POSSIBLE POLYPECTOMY 00727,98575;  Surgeon: Mikie Stock MD;  Location: [facility]    Radiation left  2014    Spine surgery procedure unlisted      Upper gi endoscopy,biopsy N/A 12/11/2014    Procedure: ESOPHAGOGASTRODUODENOSCOPY, COLONOSCOPY, POSSIBLE BIOPSY, POSSIBLE POLYPECTOMY 78945,33013;  Surgeon: Mikie Stock MD;  Location: [facility]    Upper gi endoscopy,biopsy  1/8/18= Hiatal hernia. SB Bx normal    Upper gi endoscopy,exam         Family Medical History:   Family History   Problem Relation Age of Onset    Heart Attack Father     Other (pneumonia) Mother     Other (unknown cause 32) Brother     Breast Cancer Sister 71    Other (COPD) Sister     Breast Cancer Paternal Aunt         80's    Breast Cancer Paternal Cousin  Female         early 30's    Breast Cancer Self 70        Social History:   Social History     Socioeconomic History    Marital status:      Spouse name: Not on file    Number of children: 2    Years of education: Not on file    Highest education level: Not on file   Occupational History    Occupation: Retired Nurse    Tobacco Use    Smoking status: Former     Current packs/day: 0.00     Average packs/day: 1 pack/day for 20.0 years (20.0 ttl pk-yrs)     Types: Cigarettes     Start date: 10/10/1973     Quit date: 10/10/1993     Years since quittin.4    Smokeless tobacco: Never    Tobacco comments:     Updated 24   Vaping Use    Vaping status: Never Used   Substance and Sexual Activity    Alcohol use: No    Drug use: No    Sexual activity: Not on file   Other Topics Concern     Service No    Blood Transfusions No    Caffeine Concern No    Occupational Exposure No    Hobby Hazards No    Sleep Concern No    Stress Concern No    Weight Concern Yes    Special Diet No    Back Care Yes    Exercise Not Asked    Bike Helmet Not Asked    Seat Belt Yes    Self-Exams Not Asked   Social History Narrative    Lives alone in [place] in a townhouse. Two daughters live close by.     Social Drivers of Health     Food Insecurity: No Food Insecurity (3/5/2024)    Food Insecurity     Food Insecurity: Never true   Transportation Needs: No Transportation Needs (3/5/2024)    Transportation Needs     Lack of Transportation: No   Stress: No Stress Concern Present (3/5/2024)    Stress     Feeling of Stress : No   Housing Stability: Low Risk  (3/5/2024)    Housing Stability     Housing Instability: No     Housing Instability Emergency: Not on file     Crib or Bassinette: Not on file        Medications:   Current Outpatient Medications   Medication Sig Dispense Refill    rosuvastatin 10 MG Oral Tab       CELECOXIB 200 MG Oral Cap TAKE 1 CAPSULE(200 MG) BY MOUTH DAILY 90 capsule 0    AMLODIPINE 5 MG Oral Tab TAKE 1  TABLET(5 MG) BY MOUTH DAILY 90 tablet 0    Benazepril HCl 20 MG Oral Tab Take 1 tablet (20 mg total) by mouth daily. 90 tablet 1    DULoxetine 20 MG Oral Cap DR Particles Take 1 capsule (20 mg total) by mouth daily. 90 capsule 1    DULoxetine 60 MG Oral Cap DR Particles Take 1 capsule (60 mg total) by mouth daily. 90 capsule 1    Respiratory Therapy Supplies (FULL KIT NEBULIZER SET) Does not apply Misc 1 kit As Directed. 1 each 0    Metoprolol Succinate ER 25 MG Oral Tablet 24 Hr Take 1 tablet (25 mg total) by mouth daily.      Cholecalciferol (VITAMIN D) 1000 units Oral Tab Take by mouth. Daily except when she takes weekly 50,000 dose      Omega-3 Fatty Acids (FISH OIL OR) Take by mouth daily.      rosuvastatin 5 MG Oral Tab Take 1 tablet (5 mg total) by mouth every evening. (Patient not taking: Reported on 3/18/2025) 90 tablet 1       Review of Systems: Review of Systems     Physical Exam:  Pulse 96   Resp 16   Ht 5' 6\" (1.676 m)   SpO2 95%   BMI 38.09 kg/m²      Constitutional: alert, cooperative. No acute distress.  HEENT: Head NC/AT. Nares normal. Septum midline. Mucosa normal. No drainage or sinus tenderness.  Cardio: Regular rate and rhythm. Normal S1 and S2. No murmurs.   Respiratory: Thorax symmetrical with no labored breathing. clear to auscultation bilaterally  Extremities: No clubbing or cyanosis. No BLE edema.    Neurologic: A&Ox3. No gross motor deficits.  Skin: Warm, dry  Psych: Calm, cooperative. Pleasant affect.    Results:  Images personally reviewed - reading is my own personal review  Results  DIAGNOSTIC  Spirometry: Slightly improved compared to previous. Total lung capacity slightly diminished. Residual volume significantly low. (01/29/2025)  Echocardiography: Normal       PFTs:       No data to display                   No data to display                    WBC: 9.3, done on 2/27/2025.HGB: 14.2, done on 2/27/2025.PLT: 291, done on 2/27/2025.     Glucose: 104, done on 1/17/2025.Cr: 1.12,  done on 1/17/2025.Last eGFR was 45 on 2/21/2025.CA: 10.9, done on 1/17/2025.Na: 139, done on 1/17/2025.K: 4.1, done on 1/17/2025.Cl: 106, done on 1/17/2025.CO2: 21, done on 1/17/2025.Last ALB was 4.4% done on 1/17/2025.     CT CARDIAC OVER READ  Result Date: 2/21/2025  CONCLUSION:   Large hiatal hernia containing the stomach and a portion of the pancreas.    LOCATION:  [facility]    Dictated by (CST): Bryan Power MD on 2/21/2025 at 3:46 PM     Finalized by (CST): Bryan Power MD on 2/21/2025 at 3:50 PM     XR PAIN CLINIC FLUOROSCOPY - N/C  Result Date: 1/16/2025  CONCLUSION:  Multiple fluoroscopic images of both knees were submitted by the pain management physician during the performance of a bilateral RFA of the genicular nerves.   LOCATION:  [facility]    Dictated by (CST): Gagan Bella DO on 1/16/2025 at 3:53 PM     Finalized by (CST): Gagan Bella DO on 1/16/2025 at 3:53 PM        Assessment/Plan:  Assessment & Plan  Shortness of breath  Duoneb caused paradoxical bronchospasm and coughing. Spirometry showed slight improvement but reduced total lung capacity and significantly low residual volume, likely due to obesity.  - Advise weight loss to improve lung function.  - Discontinue Duoneb due to paradoxical bronchospasm.    Obesity  Low residual volume on spirometry is associated with obesity.  - Advise weight loss to improve lung function.         Return if symptoms worsen or fail to improve.    I spent 20 minutes obtaining and reviewing records, preparing for the visit including reviewing chart and prior testing, face to face time examining the patient and obtaining history, counseling, arranging and reviewing office-based testing, independently reviewing relevant studies and documentation exclusive of other billable procedures.      Laurie Espinoza MD  3/18/2025

## 2025-03-18 NOTE — PROGRESS NOTES
The following individual(s) verbally consented to be recorded using ambient AI listening technology and understand that they can each withdraw their consent to this listening technology at any point by asking the clinician to turn off or pause the recording:    Patient name: Korina Mosquera  Additional names:

## 2025-03-18 NOTE — PROGRESS NOTES
[facility] Pulmonary Follow Up Note    Chief Complaint:  Chief Complaint   Patient presents with    Follow - Up     Here for a f/u on Pft results.       History of Present Illness:  History of Present Illness           Past Medical History:   Past Medical History:    Anxiety    Blood disorder    Anemia    Breast CA (HCC)    Breast cancer (HCC)    Her-2 positive    Breast cancer (HCC)    Her-2 positive    Breast cancer (HCC)    Her-2 positive    Cancer (HCC)    Depression    Exposure to medical diagnostic radiation    last tx 2015    Hearing impairment    hard of hearing, no hearing aids    High blood pressure    High cholesterol    Hyperlipidemia    IBS (irritable bowel syndrome)    Osteoarthritis    Personal history of antineoplastic chemotherapy    last tx 2015    Shortness of breath    Unspecified essential hypertension    Visual impairment    Readers        Past Surgical History:   Past Surgical History:   Procedure Laterality Date    Back surgery  1970    slipped disc    Capsule endoscopy - internal referral  1/24/18= Normal    Chemotherapy  2014    Colon ca scrn not hi rsk ind N/A 12/11/2014    Procedure: ESOPHAGOGASTRODUODENOSCOPY, COLONOSCOPY, POSSIBLE BIOPSY, POSSIBLE POLYPECTOMY 85481,94174;  Surgeon: Mikie Stock MD;  Location: [facility]    Colonoscopy  1/9/18= Hemorrhoids    Repeat PRN    Colonoscopy N/A 01/09/2018    Procedure: COLONOSCOPY;  Surgeon: Onur Levi MD;  Location:  ENDOSCOPY    Lumpectomy left  05/2014    IDC -done at [facility]    Raymond biopsy stereo nodule 1 site right (cpt=19081)  03/2015    benign    Arymond biopsy stereo nodule 1 site right (cpt=19081)  02/2022    radial scars    Patient documented not to have experienced any of the following events N/A 12/11/2014    Procedure: ESOPHAGOGASTRODUODENOSCOPY, COLONOSCOPY, POSSIBLE BIOPSY, POSSIBLE POLYPECTOMY 63568,77631;  Surgeon: Mikie Stock MD;  Location: [facility]    Patient withough  preoperative order for iv antibiotic surgical site infection prophylaxis. N/A 2014    Procedure: ESOPHAGOGASTRODUODENOSCOPY, COLONOSCOPY, POSSIBLE BIOPSY, POSSIBLE POLYPECTOMY 82069,25136;  Surgeon: Mikie Stock MD;  Location: [facility]    Radiation left      Spine surgery procedure unlisted      Upper gi endoscopy,biopsy N/A 2014    Procedure: ESOPHAGOGASTRODUODENOSCOPY, COLONOSCOPY, POSSIBLE BIOPSY, POSSIBLE POLYPECTOMY 76656,40425;  Surgeon: Mikie Stock MD;  Location: [facility]    Upper gi endoscopy,biopsy  18= Hiatal hernia. SB Bx normal    Upper gi endoscopy,exam         Family Medical History:   Family History   Problem Relation Age of Onset    Heart Attack Father     Other (pneumonia) Mother     Other (unknown cause 32) Brother     Breast Cancer Sister 71    Other (COPD) Sister     Breast Cancer Paternal Aunt         80's    Breast Cancer Paternal Cousin Female         early 30's    Breast Cancer Self 70        Social History:   Social History     Socioeconomic History    Marital status:      Spouse name: Not on file    Number of children: 2    Years of education: Not on file    Highest education level: Not on file   Occupational History    Occupation: Retired Nurse    Tobacco Use    Smoking status: Former     Current packs/day: 0.00     Average packs/day: 1 pack/day for 20.0 years (20.0 ttl pk-yrs)     Types: Cigarettes     Start date: 10/10/1973     Quit date: 10/10/1993     Years since quittin.4    Smokeless tobacco: Never    Tobacco comments:     Updated 24   Vaping Use    Vaping status: Never Used   Substance and Sexual Activity    Alcohol use: No    Drug use: No    Sexual activity: Not on file   Other Topics Concern     Service No    Blood Transfusions No    Caffeine Concern No    Occupational Exposure No    Hobby Hazards No    Sleep Concern No    Stress Concern No    Weight Concern Yes    Special Diet No    Back  Care Yes    Exercise Not Asked    Bike Helmet Not Asked    Seat Belt Yes    Self-Exams Not Asked   Social History Narrative    Lives alone in [place] in a townhouse. Two daughters live close by.     Social Drivers of Health     Food Insecurity: No Food Insecurity (3/5/2024)    Food Insecurity     Food Insecurity: Never true   Transportation Needs: No Transportation Needs (3/5/2024)    Transportation Needs     Lack of Transportation: No   Stress: No Stress Concern Present (3/5/2024)    Stress     Feeling of Stress : No   Housing Stability: Low Risk  (3/5/2024)    Housing Stability     Housing Instability: No     Housing Instability Emergency: Not on file     Crib or Bassinette: Not on file        Medications:   Current Outpatient Medications   Medication Sig Dispense Refill    rosuvastatin 10 MG Oral Tab       CELECOXIB 200 MG Oral Cap TAKE 1 CAPSULE(200 MG) BY MOUTH DAILY 90 capsule 0    AMLODIPINE 5 MG Oral Tab TAKE 1 TABLET(5 MG) BY MOUTH DAILY 90 tablet 0    Benazepril HCl 20 MG Oral Tab Take 1 tablet (20 mg total) by mouth daily. 90 tablet 1    DULoxetine 20 MG Oral Cap DR Particles Take 1 capsule (20 mg total) by mouth daily. 90 capsule 1    DULoxetine 60 MG Oral Cap DR Particles Take 1 capsule (60 mg total) by mouth daily. 90 capsule 1    Respiratory Therapy Supplies (FULL KIT NEBULIZER SET) Does not apply Misc 1 kit As Directed. 1 each 0    Metoprolol Succinate ER 25 MG Oral Tablet 24 Hr Take 1 tablet (25 mg total) by mouth daily.      Cholecalciferol (VITAMIN D) 1000 units Oral Tab Take by mouth. Daily except when she takes weekly 50,000 dose      Omega-3 Fatty Acids (FISH OIL OR) Take by mouth daily.      rosuvastatin 5 MG Oral Tab Take 1 tablet (5 mg total) by mouth every evening. (Patient not taking: Reported on 3/18/2025) 90 tablet 1       Review of Systems: Review of Systems     Physical Exam:  Pulse 96   Resp 16   Ht 5' 6\" (1.676 m)   SpO2 95%   BMI 38.09 kg/m²      Constitutional: alert,  cooperative. No acute distress.  HEENT: Head NC/AT. {Exam; nose:05465::\"Nares normal. Septum midline. Mucosa normal. No drainage or sinus tenderness.\"}  Cardio: Regular rate and rhythm. Normal S1 and S2. No murmurs.   Respiratory: Thorax symmetrical with no labored breathing. {lung exam:9874}  Extremities: No clubbing or cyanosis. No BLE edema.    Neurologic: A&Ox3. No gross motor deficits.  Skin: Warm, dry  Psych: Calm, cooperative. Pleasant affect.    Results:  Images personally reviewed - reading is my own personal review  Results         PFTs:       No data to display                   No data to display                    WBC: 9.3, done on 2/27/2025.HGB: 14.2, done on 2/27/2025.PLT: 291, done on 2/27/2025.     Glucose: 104, done on 1/17/2025.Cr: 1.12, done on 1/17/2025.Last eGFR was 45 on 2/21/2025.CA: 10.9, done on 1/17/2025.Na: 139, done on 1/17/2025.K: 4.1, done on 1/17/2025.Cl: 106, done on 1/17/2025.CO2: 21, done on 1/17/2025.Last ALB was 4.4% done on 1/17/2025.     CT CARDIAC OVER READ  Result Date: 2/21/2025  CONCLUSION:   Large hiatal hernia containing the stomach and a portion of the pancreas.    LOCATION:  [facility]    Dictated by (CST): Bryan Power MD on 2/21/2025 at 3:46 PM     Finalized by (CST): Bryan Power MD on 2/21/2025 at 3:50 PM     XR PAIN CLINIC FLUOROSCOPY - N/C  Result Date: 1/16/2025  CONCLUSION:  Multiple fluoroscopic images of both knees were submitted by the pain management physician during the performance of a bilateral RFA of the genicular nerves.   LOCATION:  [facility]    Dictated by (CST): Gagan Bella DO on 1/16/2025 at 3:53 PM     Finalized by (CST): Gagan Bella DO on 1/16/2025 at 3:53 PM        Assessment/Plan:  Assessment & Plan           No follow-ups on file.    I spent *** minutes obtaining and reviewing records, preparing for the visit including reviewing chart and prior testing, face to face time examining the patient and obtaining history, counseling, arranging and  reviewing office-based testing, independently reviewing relevant studies and documentation exclusive of other billable procedures.      Laurie Espinoza MD  3/18/2025

## 2025-03-28 ENCOUNTER — TELEPHONE (OUTPATIENT)
Dept: FAMILY MEDICINE CLINIC | Facility: CLINIC | Age: 80
End: 2025-03-28

## 2025-03-28 NOTE — TELEPHONE ENCOUNTER
Per patient [facility] does not have her Duloxetine refill in their system.  Per our chart, Duloxetine was sent on 12/13/2024 #90 with 1 refill.    Called [facility] and given verbal authorization for the refill #90 without additional refill.    Patient informed and advised to call [facility].  Voiced understanding and agreed with plan.

## 2025-03-28 NOTE — TELEPHONE ENCOUNTER
Patient out of medication and requesting refill for    DULoxetine 60 MG Oral Cap DR Particles     [id number] [address] AT [facility] [address], [phone], [phone]

## 2025-03-31 ENCOUNTER — HOSPITAL ENCOUNTER (OUTPATIENT)
Dept: MAMMOGRAPHY | Facility: HOSPITAL | Age: 80
Discharge: HOME | End: 2025-03-31
Attending: INTERNAL MEDICINE
Payer: MEDICARE

## 2025-03-31 DIAGNOSIS — Z12.31 ENCOUNTER FOR MAMMOGRAM TO ESTABLISH BASELINE MAMMOGRAM: ICD-10-CM

## 2025-03-31 PROCEDURE — 77067 SCR MAMMO BI INCL CAD: CPT | Performed by: INTERNAL MEDICINE

## 2025-03-31 PROCEDURE — 77063 BREAST TOMOSYNTHESIS BI: CPT | Performed by: INTERNAL MEDICINE

## 2025-04-02 ENCOUNTER — TELEPHONE (OUTPATIENT)
Dept: PAIN CLINIC | Facility: CLINIC | Age: 80
End: 2025-04-02

## 2025-04-02 ENCOUNTER — PATIENT OUTREACH (OUTPATIENT)
Dept: CASE MANAGEMENT | Age: 80
End: 2025-04-02

## 2025-04-02 DIAGNOSIS — F32.A DEPRESSION, UNSPECIFIED DEPRESSION TYPE: ICD-10-CM

## 2025-04-02 DIAGNOSIS — I25.10 CORONARY ARTERY DISEASE INVOLVING NATIVE CORONARY ARTERY OF NATIVE HEART, UNSPECIFIED WHETHER ANGINA PRESENT: Primary | ICD-10-CM

## 2025-04-02 DIAGNOSIS — E78.2 HYPERLIPIDEMIA, MIXED: ICD-10-CM

## 2025-04-02 DIAGNOSIS — I10 HYPERTENSION, UNSPECIFIED TYPE: ICD-10-CM

## 2025-04-02 DIAGNOSIS — F41.9 ANXIETY: ICD-10-CM

## 2025-04-02 DIAGNOSIS — M17.0 PRIMARY OSTEOARTHRITIS OF BOTH KNEES: ICD-10-CM

## 2025-04-02 NOTE — TELEPHONE ENCOUNTER
Spoke with patient for CCM.     Patient had bilateral genicular neve RFA on 1/16. Is reporting increased pain today-7/10. Patient is taking Celecoxib 100mg once a day and states that it does not provide any relief. Patient is requesting outreach with additional recommendations. Please advise.

## 2025-04-02 NOTE — PROGRESS NOTES
Spoke to Korina for Chronic Care Management.      Updates to patient care team/comments: Patient established care with pulmonology on 3/18, she declines further follow up with this provider.     Patient reported changes in medications: None    Med Adherence  Comment: Patient reports taking all medications as directed.    Health Maintenance:     Health Maintenance   Topic Date Due    Zoster Vaccines (2 of 2) 11/21/2024    Annual Depression Screening  01/01/2025    COVID-19 Vaccine (9 - 2024-25 season) 03/26/2025    Annual Physical  12/13/2025    Influenza Vaccine  Completed    DEXA Scan  Completed    Fall Risk Screening (Annual)  Completed    Pneumococcal Vaccine: 50+ Years  Completed    Meningococcal B Vaccine  Aged Out       Patient updates/concerns:     Patient is reporting increased knee pain due to bilateral osteoarthritis. Patient states pain is worse when going from sit to stand position. Patient had bilateral genicular nerve RFA on 1/16. Is reporting increased pain today-7/10. Patient is taking Celecoxib 100mg once a day and states that it does not provide any relief. [facility] will send TE to pain management for further recommendations.     Patient had CT gated coronary artery with calcium scoring done on 2/21, she confirms that she did discuss results with Cardiology. Patient has follow up appointment scheduled with Cardiology on 4/9.     We briefly reviewed pulmonology visit on 3/18. Patient states that the only recommendation made was weight loss. Patient states that this is already something that she has been actively working on.     Patient states that she can tell she lost weight, because her clothing is fitting loose. CCM encouraged patient to celebrate those non scale victories. Patient states that she will continue to work on her diet,  usually does not indulge in sweets unless there is a celebration of some sort like a birthday or wedding. CCM encouraged patient to continue to work on her diet and look  for opportunities to increase activity as tolerated in order to achieve weight loss. CCM also discussed with patient the importance of tracking her weight as she often declines getting on the scale. If we do not get current weight, we are unable to measure whether or not patient is actually losing graciela. We reviewed that she does not have to look at the scale when her weight is checked and patient states that she is agreeable to being weighed at her next office visit with PCP in July.     Patient shares with CCM that she will be having dental work done in near future. Patient touched on the cost of dental care being higher than expected. CCM will be following up with low cost dental care resources in my chart.     Goals/Action Plan:    Active goal from previous outreach: Patient to continue to work on diet and increase physical activity as tolerated to achieve weight loss.         Patient reported progress towards goals: Patient reports that she continues to work on her diet to achieve weight loss. Activity can be difficult due to increased knee pain, however patient states that she is up and active around her home. Will get up and take walks around her home.               - What: Patient will continue to go for walks as tolerated            - Where/When/How: Minimum 2-3x per week at grocery store, shopping center, in her neighborhood.    Patient Reported Barriers and Concerns: N/A                   - Plan for overcoming barriers: CCM encouraged patient to call as needed with any questions or concerns.     Care Managers Interventions:     TE sent to pain management to request outreach with further recommendations.   My chart message sent with resources for low cost dental care.   CCM encouraged patient to continue to work on her diet and look for opportunities to increase activity as tolerated in order to achieve weight loss.  CCM thoroughly reviewed patient's chart including any outside records in Care Everywhere.    CCM conducted an [facility] immunization query, which confirmed that there are no updates to immunization record necessary at this time.   CCM listened to patient's concern's, provided emotional support and encouraged the patient to reach out as needed further assistance.   CCM to continue to provide encouragement, support and education for healthy coping and management of dx.        Future Appointments:   Future Appointments   Date Time Provider Department Center   4/23/2025 12:40 PM [facility]  M[facility]   7/15/2025  1:30 PM Hanna Avery MD [id number]         Next Care Manager Follow Up Date: 1 Month     Reason For Follow Up: review progress and or barriers towards patient's goals.     Time Spent This Encounter Total: 43 min medical record review, telephone communication, care plan updates where needed, education, goals, and action plan recreation/update. Provided acknowledgment and validation to patient's concerns.   Monthly Minute Total including today: 43 Minutes  Physical assessment, complete health history, and need for CCM established by Hanna Avery MD.

## 2025-04-03 NOTE — TELEPHONE ENCOUNTER
Tim Solano PA  You1 hour ago (11:39 AM)       When she was here after radiofrequency ablation pain had been moderately improved.  If the effects have already waned, would not suggest repeating RFA, and would have her discuss options with Ortho, as she has known and significant OA.  If there are no other options, there is the possibility of PRP injection, though this would not be covered by insurance, and would be a purely out of pocket expense.      If patient is concerned that this pain is a result of the procedure, the RFA was done months ago, and she would be well outside the range where we would be concerned about any sort of pain from the procedure itself.     Contacted pt who states her pain has increased recently, she believes the procedure is wearing off. Advised pt we would not be able to repeat this procedure for her as it is too soon. Additionally advised that it may not be recommended to repeat if her response to the procedure was not adequate. Pt states she would not want to repeat the procedure. Advised pt that we do not manage any meds for her, they are managed by Dr. Avery. Suggested pt contact Dr. Avery's office to discuss a medication adjustment. Pt MARKUS.

## 2025-04-23 ENCOUNTER — HOSPITAL ENCOUNTER (OUTPATIENT)
Dept: MAMMOGRAPHY | Facility: HOSPITAL | Age: 80
Discharge: HOME | End: 2025-04-23
Attending: INTERNAL MEDICINE
Payer: MEDICARE

## 2025-04-23 DIAGNOSIS — R92.2 INCONCLUSIVE MAMMOGRAM: ICD-10-CM

## 2025-04-23 PROCEDURE — 77065 DX MAMMO INCL CAD UNI: CPT | Performed by: INTERNAL MEDICINE

## 2025-04-23 PROCEDURE — 76642 ULTRASOUND BREAST LIMITED: CPT | Performed by: INTERNAL MEDICINE

## 2025-04-23 PROCEDURE — 77061 BREAST TOMOSYNTHESIS UNI: CPT | Performed by: INTERNAL MEDICINE

## 2025-04-23 NOTE — IMAGING NOTE
This Breast Care RN assisted Dr. Sylvester with recommendation for a right breast 2 site ultrasound guided biopsy for masses.  Procedure reviewed and all questions answered. Emotional and educational support given. On the day of the biopsy, pt instructed to take Tylenol 1000mg PO, eat a light meal & bring or wear a sports bra.  Post biopsy care also reviewed with pt to include NO lifting more than 5lbs, no exercising or housework (limit upper body movement) for 24-48 hrs post biopsy. Patient denies blood thinners, bleeding disorders, liver disease, and chemo. Pt verbalized understanding.  Our breast center schedulers will be calling to schedule an appt that is convenient for pt.

## 2025-04-30 ENCOUNTER — HOSPITAL ENCOUNTER (OUTPATIENT)
Dept: MAMMOGRAPHY | Facility: HOSPITAL | Age: 80
Discharge: HOME | End: 2025-04-30
Attending: INTERNAL MEDICINE
Payer: MEDICARE

## 2025-04-30 DIAGNOSIS — R92.8 ABNORMAL MAMMOGRAM: ICD-10-CM

## 2025-04-30 DIAGNOSIS — N63.0 BREAST MASS: ICD-10-CM

## 2025-04-30 PROCEDURE — 88360 TUMOR IMMUNOHISTOCHEM/MANUAL: CPT | Performed by: INTERNAL MEDICINE

## 2025-04-30 PROCEDURE — 19083 BX BREAST 1ST LESION US IMAG: CPT | Performed by: INTERNAL MEDICINE

## 2025-04-30 PROCEDURE — 88344 IMHCHEM/IMCYTCHM EA MLT ANTB: CPT | Performed by: INTERNAL MEDICINE

## 2025-04-30 PROCEDURE — 88305 TISSUE EXAM BY PATHOLOGIST: CPT | Performed by: INTERNAL MEDICINE

## 2025-04-30 PROCEDURE — 77065 DX MAMMO INCL CAD UNI: CPT | Performed by: INTERNAL MEDICINE

## 2025-04-30 PROCEDURE — 88341 IMHCHEM/IMCYTCHM EA ADD ANTB: CPT | Performed by: INTERNAL MEDICINE

## 2025-04-30 PROCEDURE — 88342 IMHCHEM/IMCYTCHM 1ST ANTB: CPT | Performed by: INTERNAL MEDICINE

## 2025-04-30 PROCEDURE — 19084 BX BREAST ADD LESION US IMAG: CPT | Performed by: INTERNAL MEDICINE

## 2025-04-30 NOTE — DISCHARGE INSTRUCTIONS
Discharge Instructions  Stereotactic / Ultrasound / MRI Core Biopsy     Place an ice pack on top of the biopsy site in your bra OR the folds of the Ace wrap for 10-15 minutes every hour until bedtime for your comfort and to decrease bleeding.     Keep your supportive bra OR the Ace wrap in place for 24 hours after your biopsy. This compression decreases bleeding and breast movement for your comfort. Wear a supportive bra for the next couple days for comfort (as well as for sleeping)     No bath or shower during the first 24 hours after biopsy. After this time, you may take a bath or shower. It’s okay if the steri-strips get wet but don’t soak them.   No saunas, hot tubs, or swimming pools until the steri-strips fall off (5-7days).  This prevents infection and allows time for the area to completely close and heal.     DO NOT remove the steri-strips. They will fall off in 5 days to two weeks. If any type of irritation (redness, itching, OR blisters) develops in the area around the steri-strips, remove them gently. Keep the area clean and dry.     It is normal to have mild discomfort and bruising at the biopsy site.      You may take Tylenol as needed for discomfort.     DO NOT participate in strenuous activity (aerobics, heavy lifting, housework, gardening, etc.) 48 hours after your biopsy to prevent bleeding.     You will receive results typically within 2-3 business days. Occasionally, the specimen is sent off-site for a 2nd opinion. You will be notified when this occurs as this will delay your results.     If you have any questions about the procedure or your results, please contact the Breast Care Coordinator Nurse at [phone]. (M-F 7:30-4)    If you are having a MRI Breast Biopsy or an Ultrasound guided biopsy, you will be billed for the biopsy and a unilateral mammogram separately.    A 6-month or one year follow-up Diagnostic Mammogram/Ultrasound will be recommended to document stability of the biopsy  site. It may be scheduled at [facility] or [facility] by calling [phone]. You will need an order for this exam from your referring physician.       5/2024

## 2025-05-02 ENCOUNTER — TELEPHONE (OUTPATIENT)
Age: 80
End: 2025-05-02

## 2025-05-02 NOTE — TELEPHONE ENCOUNTER
Spoke to patient.  History of left breast carcinoma for which she underwent lumpectomy/SLN biopsy 6/14.  3.2 cm grade 3 IDC.  Oncotype was 19.  Received 4 cycles of TC.  RT completed.  Had difficulty tolerating endocrine therapy so this was discontinued.    Screening mammogram 3/25 showed new right breast abnormality.  R diagnostic mammogram 4/25-2 masses right breast 10:00 5 cm and 6 cm from nipple.  Biopsy done.    Pathology 10:00 5 cm from nipple shows a 2 mm focus of invasive mammary carcinoma with lobular features.  Pathology 10:00 6 cm from nipple shows grade 2 IDC, 100% ER, 90% CO, HER2 1+ by IHC, Ki-67 25%.    Spoke to patient and recommended surgical evaluation.  Further management will depend on pathology.  She had difficulty tolerating endocrine therapy in the past.

## 2025-05-05 ENCOUNTER — TELEPHONE (OUTPATIENT)
Age: 80
End: 2025-05-05

## 2025-05-05 NOTE — TELEPHONE ENCOUNTER
Called patient and introduced myself as one of the breast nurse navigators at the [facility] and that I work with Dr. Gunn. Verified her full name and  and provided her positive breast biopsy results for malignancy. Reviewed her detail her results and stated that the next steps would be to schedule with a breast surgeon. I asked her if she would want to go back to Dr. Sanderson that performed her last breast surgeon and she accepted my offer to be scheduled with an [facility] breast surgeon. Offered her an appointment with Dr. Lemus on Wednesday May 7, 2025 or with Dr. Duffy on Tuesday May 13, 2025 and she accepted the appointment with Dr. Duffy on Tuesday May 13, 2025 at 1200 in [place]. Provided her the breast RN navigators contact information. She thanked me for the phone call and assistance.

## 2025-05-09 DIAGNOSIS — M25.562 CHRONIC PAIN OF LEFT KNEE: ICD-10-CM

## 2025-05-09 DIAGNOSIS — G89.29 CHRONIC PAIN OF RIGHT KNEE: ICD-10-CM

## 2025-05-09 DIAGNOSIS — I10 ESSENTIAL HYPERTENSION: ICD-10-CM

## 2025-05-09 DIAGNOSIS — M25.561 CHRONIC PAIN OF RIGHT KNEE: ICD-10-CM

## 2025-05-09 DIAGNOSIS — G89.29 CHRONIC PAIN OF LEFT KNEE: ICD-10-CM

## 2025-05-13 ENCOUNTER — OFFICE VISIT (OUTPATIENT)
Dept: SURGERY | Facility: CLINIC | Age: 80
End: 2025-05-13
Payer: MEDICARE

## 2025-05-13 VITALS
DIASTOLIC BLOOD PRESSURE: 82 MMHG | HEART RATE: 97 BPM | OXYGEN SATURATION: 93 % | BODY MASS INDEX: 38 KG/M2 | SYSTOLIC BLOOD PRESSURE: 124 MMHG | RESPIRATION RATE: 16 BRPM | HEIGHT: 66 IN

## 2025-05-13 DIAGNOSIS — C50.911 INVASIVE DUCTAL CARCINOMA OF RIGHT BREAST (HCC): Primary | ICD-10-CM

## 2025-05-13 PROCEDURE — 99205 OFFICE O/P NEW HI 60 MIN: CPT | Performed by: SURGERY

## 2025-05-13 NOTE — PROGRESS NOTES
Breast Surgery New Patient Consultation    This is the first visit for this 79 year old woman, referred by Dr. Lund, who presents for evaluation of right breast cancer.    History of Present Illness:   Ms. Korina Mosquera is a 79 year old woman who presents with imaging detected right breast cancer.  The patient denies any palpable masses, nipple discharge, skin changes or axillary symptoms.  She does have a personal history of a HER2 positive breast cancer at the age of 71 years old for which she underwent lumpectomy, chemotherapy and radiation therapy.  She did not have prior genetic testing.  Her bilateral diagnostic surveillance in 2024 was unremarkable.  She presented for a screening on 2025 and was noted to have a new asymmetry in the right breast.  She had a diagnostic evaluation of the right side on 2025 and was confirmed to have 2 masses at 10:00 6 cm from the nipple and 5 cm from the nipple measuring 1.7 cm and 5 mm respectively and which were 1.1 cm apart from each other for which she underwent a 2 site ultrasound-guided biopsy on 2025.  She was noted to have malignancy at both locations that was ER/WA positive, HER2/emily negative with a Ki-67 of 15 to 25%. She is here today for evaluation and recommendations for further therapy.        Past Medical History[1]    Past Surgical History[2]    Gynecological History:  Pt is a   Pt was 26 years old at time of first pregnancy.    She denies any cumulative breastfeeding history   She achieved menarche at age 12 and LMP age 54  Age of Menopause: 54  Type: natural menopause  She denies any history of hormone replacement therapy   She has history of oral contraceptive use for 2 years.  She denies infertility treatment to achieve pregnancy.    Medications:    Current Medications[3]    Allergies:    Allergies[4]    Family History:   Family History[5]    She is not of Ashkenazi Hoahaoism ancestry.    Social  History:  History   Alcohol Use No       History   Smoking Status    Former    Types: Cigarettes   Smokeless Tobacco    Never     Ms. Korina Mosquera is  with 6 children. She has 3 siblings. She is currently Retired    Review of Systems:  General:   The patient denies, fever, chills, night sweats, fatigue, generalized weakness, change in appetite or weight loss.    HEENT:     The patient denies eye irritation, +cataracts, redness, glaucoma, yellowing of the eyes, change in vision, color blindness, or wearing contacts/glasses. The patient denies hearing loss, ringing in the ears, ear drainage, earaches, nasal congestion, nose bleeds, snoring, pain in mouth/throat, hoarseness, change in voice, facial trauma.    Respiratory:  The patient denies chronic cough, phlegm, hemoptysis, pleurisy/chest pain, pneumonia, asthma, wheezing, difficulty in breathing with exertion, emphysema, chronic bronchitis, shortness of breath or abnormal sound when breathing.     Cardiovascular:  There is no history of chest pain, chest pressure/discomfort, palpitations, irregular heartbeat, fainting or near-fainting, difficulty breathing when lying flat, SOB/Coughing at night, swelling of the legs or chest pain while walking.    Breasts:  See history of present illness    Gastrointestinal:     There is no history of difficulty or pain with swallowing, reflux symptoms, vomiting, dark or bloody stools, constipation, yellowing of the skin, indigestion, nausea, change in bowel habits, diarrhea, abdominal pain or vomiting blood.     Genitourinary:  The patient denies frequent urination, needing to get up at night to urinate, urinary hesitancy or retaining urine, painful urination, urinary incontinence, decreased urine stream, blood in the urine or vaginal/penile discharge.    Skin:    The patient denies rash, itching, skin lesions, dry skin, change in skin color or change in moles.     Hematologic/Lymphatic:  The patient denies easily  bruising or bleeding or persistent swollen glands or lymph nodes.     Musculoskeletal:  The patient denies muscle aches/pain, joint pain, +stiff joints, neck pain, back pain or bone pain.    Neuropsychiatric:  There is no history of migraines or severe headaches, seizure/epilepsy, speech problems, coordination problems, trembling/tremors, fainting/black outs, dizziness, memory problems, loss of sensation/numbness, problems walking, weakness, tingling or burning in hands/feet. There is no history of abusive relationship, bipolar disorder, sleep disturbance, anxiety, +depression or feeling of despair.    Endocrine:    There is no history of poor/slow wound healing, weight loss/gain, fertility or hormone problems, cold intolerance, thyroid disease.     Allergic/Immunologic:  There is no history of hives, hay fever, angioedema or anaphylaxis.    /82 (BP Location: Right arm, Patient Position: Sitting, Cuff Size: adult)   Pulse 97   Resp 16   Ht 1.676 m (5' 6\")   SpO2 93%   BMI 38.09 kg/m²     Physical Exam:  The patient is an alert, oriented, well-nourished and  well-developed woman who appears her stated age. Her speech patterns and movements are normal. Her affect is appropriate.    HEENT: The head is normocephalic. The neck is supple. The thyroid is not enlarged and is without palpable masses/nodules. There are no palpable masses. The trachea is in the midline. Conjunctiva are clear, non-icteric.    Chest: The chest expands symmetrically. The lungs are clear to auscultation.    Heart: The rhythm is regular.  There are no murmurs, rubs, gallops or thrills.    Breasts:  Her breasts are symmetrical with a cup size 42B.  Right breast: The skin, nipple ,and areola appear normal. There is no skin dimpling with movement of the pectoralis. There is no nipple retraction. No nipple discharge can be elicited. The parenchyma is mildly nodular. There are no dominant masses in the breast. The axillary tail is normal.   Prior incision on the breast with no underlying palpable concerns.  Left breast:   The skin, nipple, and areola appear normal. There is no skin dimpling with movement of the pectoralis. There is no nipple retraction. No nipple discharge can be elicited. The parenchyma is mildly nodular. There are no dominant masses in the breast. The axillary tail is normal.    Abdomen:  The abdomen is soft, flat and non tender. The liver is not enlarged. There are no palpable masses.    Lymph Nodes:  The supraclavicular, axillary and cervical regions are free of significant lymphadenopathy.    Back: There is no vertebral column tenderness.    Skin: The skin appears normal. There are no suspicious appearing rashes or lesions.    Extremities: The extremities are without deformity, cyanosis or edema.    Impression:   Ms. Korina Mosquera is a 79 year old woman presents with history of a HER2 positive right breast cancer with new diagnosis of ipsilateral right breast multifocal breast cancer, clinical stage T1 (M) NXMX.    Discussion and Plan:  I had a discussion with the Patient regarding her breast exam. On exam today I found doing well since the biopsies was no other clinical findings.  I personally reviewed the recent imaging and pathology and we discussed this at length.    The natural history and evolution of breast cancer were discussed with Ms. Korina Mosquera and her  family, including the difference between in-situ and invasive carcinoma, and the distinction  between local and systemic disease and local and systemic therapy. For local treatment options,  I explained the risks and benefits of breast conservation and mastectomy (with or without  reconstruction), including the fact that survival rates are equal with these two approaches.  If breast conservation is elected, I explained the need for free margins, the possibility of re-  excision to achieve free margins, and the need for post-operative radiotherapy.  The approach  to patrizia staging was also described, including the technique, risks and benefits of sentinel node  biopsy, the possible need for axillary dissection, and the long-term sequelae of this procedure.  With regard to systemic therapy, final recommendation will be made following receipt of final  pathology post-op, but I outlined the possibility of endocrine therapy, chemotherapy, and  herceptin, depending on tumor marker profile.  Following this discussion, where all of the patient's questions were answered, we agreed to  proceed with genetic testing given her prior history of new diagnosis though the patient states this will not influence her surgical decision making.  Though she has had prior radiation and we discussed the end of care in this setting would be a completion mastectomy due to age and comorbidities she believes that the operation would be very difficult.  We did discuss possibly ordering an MRI given the multifocal nature of this however due to the positioning required for that test she is unable to tolerate that and therefore we ultimately agreed to proceed with a right breast bracketed lumpectomy.  Her case was discussed at multidisciplinary tumor board and the discussion was that given her prior lymph node interrogation in the setting of a normal clinical and radiological axillary exam and advanced age and comorbidities that a lymph node biopsy would not change any subsequent treatment recommendations and therefore could safely be deferred to decrease operative morbidity.  The risks and possible complications of the procedure were explained to the patient and her family and she understood and agreed to the proposed plan. She was given ample opportunity for questions and those questions were answered to her satisfaction. She has been  encouraged to contact the office with any questions or concerns prior to her next appointment.     This note was created by Dragon voice recognition. Errors  in content may be related to improper recognition by the system; efforts to review and correct have been done but errors may still exist. Please be advised the primary purpose of this note is for me to communicate medical care. Standard sentence structure is not always used. Medical terminology and medical abbreviations may be used. There may be grammatical, typographical, and automated fill ins that may have errors missed in proofreading.           [1]   Past Medical History:   Anxiety    Blood disorder    Anemia    Breast CA (HCC)    Breast cancer (HCC)    Her-2 positive    Breast cancer (HCC)    Her-2 positive    Breast cancer (HCC)    Her-2 positive    Cancer (HCC)    Depression    Exposure to medical diagnostic radiation    last tx 2015    Hearing impairment    hard of hearing, no hearing aids    High blood pressure    High cholesterol    Hyperlipidemia    IBS (irritable bowel syndrome)    Osteoarthritis    Personal history of antineoplastic chemotherapy    last tx 2015    Shortness of breath    Unspecified essential hypertension    Visual impairment    Readers   [2]   Past Surgical History:  Procedure Laterality Date    Back surgery  1970    slipped disc    Capsule endoscopy - internal referral  1/24/18= Normal    Chemotherapy  2014    Colon ca scrn not hi rsk ind N/A 12/11/2014    Procedure: ESOPHAGOGASTRODUODENOSCOPY, COLONOSCOPY, POSSIBLE BIOPSY, POSSIBLE POLYPECTOMY 62153,66799;  Surgeon: Mikie Stock MD;  Location: [facility]    Colonoscopy  1/9/18= Hemorrhoids    Repeat PRN    Colonoscopy N/A 01/09/2018    Procedure: COLONOSCOPY;  Surgeon: Onur Levi MD;  Location:  ENDOSCOPY    Lumpectomy left  05/2014    ID[facility] -done at [facility]    Raymond biopsy stereo nodule 1 site right (cpt=19081)  03/2015    benign    Raymond biopsy stereo nodule 1 site right (cpt=19081)  02/2022    radial scars    Patient documented not to have experienced any of the following events N/A 12/11/2014     Procedure: ESOPHAGOGASTRODUODENOSCOPY, COLONOSCOPY, POSSIBLE BIOPSY, POSSIBLE POLYPECTOMY 76193,91426;  Surgeon: Mikie Stock MD;  Location: [facility]    Patient withough preoperative order for iv antibiotic surgical site infection prophylaxis. N/A 12/11/2014    Procedure: ESOPHAGOGASTRODUODENOSCOPY, COLONOSCOPY, POSSIBLE BIOPSY, POSSIBLE POLYPECTOMY 85311,28373;  Surgeon: Mikie Stock MD;  Location: [facility]    Radiation left  2014    Spine surgery procedure unlisted      Upper gi endoscopy,biopsy N/A 12/11/2014    Procedure: ESOPHAGOGASTRODUODENOSCOPY, COLONOSCOPY, POSSIBLE BIOPSY, POSSIBLE POLYPECTOMY 92252,09544;  Surgeon: Mikie Stock MD;  Location: [facility]    Upper gi endoscopy,biopsy  1/8/18= Hiatal hernia. SB Bx normal    Upper gi endoscopy,exam     [3]   No outpatient medications have been marked as taking for the 5/13/25 encounter (Appointment) with Jessica Duffy MD.   [4]   Allergies  Allergen Reactions    Codeine NAUSEA ONLY    Hm Lidocaine Patch [Lidocaine] ITCHING   [5]   Family History  Problem Relation Age of Onset    Heart Attack Father     Other (pneumonia) Mother     Other (unknown cause 32) Brother     Breast Cancer Sister 71    Other (COPD) Sister     Breast Cancer Paternal Aunt         80's    Breast Cancer Paternal Cousin Female         early 30's    Breast Cancer Self 68

## 2025-05-13 NOTE — PATIENT INSTRUCTIONS
Dr. Jessica Duffy  Tel: [phone]  Fax: [phone] [place] [address]  [phone]     Surgery/Procedure: Right breast wire bracketed localized lumpectomy     Anesthesia:   MAC Surgery Length:   1 hour CPT:  50258, 83947   Wire LOC:   Yes Nuc Med:   No   Shu Seed:  No       Dx & ICD-10: Invasive ductal carcinoma of right breast (HCC) (C50.911)   Radiology Instructions: 1st site: Right breast, 10 o'clock position, 5 cm from the nipple, pellet shaped clip, biopsy demonstrates invasive mammary carcinoma. 2nd site: Right breast, 10 o'clock position, 6 cm from the nipple, Q shaped clip, biopsy demonstrates invasive ductal carcinoma.    _______________________________________________________________________________    Someone must accompany you the day of the procedure to drive you home safely, because of anesthesia.  You will need an adult  to stay with you the first night following your surgery.  You must remove any kind of makeup, acrylic nails, lotions, powders, creams or deodorant.  [facility] ONLY: Pre-admission will give instruct you on when to take Gatorade and Tylenol/acetaminophen prior to your surgery, purchase 2 - 12oz bottles of regular Gatorade (NOT RED/SUGAR FREE). Otherwise, you may not eat or drink anything else after 11PM the night before surgery.    [facility] ONLY: You may not eat or drink anything after midnight the day of your surgery.   Wear comfortable clothing that can be easily removed.  If you wear dentures, contacts lenses, or any prosthesis, you will be asked to remove them.  Do not drink alcohol or smoke 24 hours prior to your procedure.  Bring a picture ID and your insurance card.  Covid-19 testing is no longer required before surgery unless you are experiencing symptoms such as fever, cough, congestion, etc.   The Pre-Admission Testing Department will call the day before to confirm your procedure, give you the time you need to arrive  by and directions on where to go. They begin making calls after 2pm, if you are not contacted by 4pm, please call the surgeon's office listed above.  Do not take any blood thinners at least one week prior to the procedure/surgery. This includes aspirin, baby aspirin, Ibuprofen products, herbal supplements, diet medications, vitamin E, fish oil and green tea supplements. Please check other supplements for these ingredients. *TYLENOL or acetaminophen is acceptable*  If you take Coumadin, Plavix, Xarelto, or Eliquis, please contact your prescribing physician for special instructions on how long to hold. If you take insulin contact your primary care physician for special instructions.  Our surgery scheduler, Kathe, will be contacting you to discuss surgery dates. If you have any questions related to scheduling your surgery, please reach out to her at [phone].  _____________________________________________________________________  PRE-OPERATIVE TESTING IF INDICATED BELOW  PLEASE COMPLETE ASAP (AT LEAST 14-21 DAYS PRIOR TO SURGERY)  [] CBC [x] BMP [] CMP [x] EKG    [] PT, PTT, INR [] Cardiac Clearance  [x] H&P Medical Clearance [] Chest X-ray     Please call Central Scheduling to schedule an appointment for pre-operative labs/tests once your orders are placed @ [phone]  Does the patient have a pacemaker or ICD?                              Faxitron/Trident in OR?  [] Yes   [x] No                               [] Yes   [x] No

## 2025-05-14 ENCOUNTER — TELEPHONE (OUTPATIENT)
Dept: SURGERY | Facility: CLINIC | Age: 80
End: 2025-05-14

## 2025-05-14 ENCOUNTER — TELEPHONE (OUTPATIENT)
Dept: FAMILY MEDICINE CLINIC | Facility: CLINIC | Age: 80
End: 2025-05-14

## 2025-05-14 DIAGNOSIS — C50.911 INVASIVE DUCTAL CARCINOMA OF RIGHT BREAST (HCC): Primary | ICD-10-CM

## 2025-05-14 NOTE — TELEPHONE ENCOUNTER
Calling pt in regards to scheduling surgery.  Informed pt that I have 05/30/2025 available at [facility] with Dr. Duffy.  Pt verbalized understanding and in agreement with date and location.  All questions answered.   Encouraged pt to call or [facility] message office with any other questions or concerns.

## 2025-05-16 ENCOUNTER — TELEPHONE (OUTPATIENT)
Dept: MAMMOGRAPHY | Facility: HOSPITAL | Age: 80
End: 2025-05-16

## 2025-05-16 RX ORDER — AMLODIPINE BESYLATE 5 MG/1
5 TABLET ORAL DAILY
Qty: 90 TABLET | Refills: 0 | Status: SHIPPED | OUTPATIENT
Start: 2025-05-16

## 2025-05-16 RX ORDER — CELECOXIB 200 MG/1
200 CAPSULE ORAL DAILY
Qty: 90 CAPSULE | Refills: 0 | Status: SHIPPED | OUTPATIENT
Start: 2025-05-16

## 2025-05-16 NOTE — TELEPHONE ENCOUNTER
Attempted to reach patient regarding localization procedure education. Unable to leave message due to no voicemail.

## 2025-05-16 NOTE — TELEPHONE ENCOUNTER
Requested Prescriptions     Pending Prescriptions Disp Refills    AMLODIPINE 5 MG Oral Tab [Pharmacy Med Name: AMLODIPINE BESYLATE 5MG TABLETS] 90 tablet 0     Sig: TAKE 1 TABLET(5 MG) BY MOUTH DAILY    CELECOXIB 200 MG Oral Cap [Pharmacy Med Name: CELECOXIB 200MG CAPSULES] 90 capsule 0     Sig: TAKE 1 CAPSULE(200 MG) BY MOUTH DAILY     Last refill 2/13/25 #90  LOV 12/13/24  Future Appointments   Date Time Provider Department Center   5/19/2025  9:00 AM Hanna Avery MD [id number]                                 Last labs 1/17/25

## 2025-05-19 ENCOUNTER — LAB ENCOUNTER (OUTPATIENT)
Dept: LAB | Age: 80
End: 2025-05-19
Attending: FAMILY MEDICINE
Payer: MEDICARE

## 2025-05-19 ENCOUNTER — OFFICE VISIT (OUTPATIENT)
Dept: FAMILY MEDICINE CLINIC | Facility: CLINIC | Age: 80
End: 2025-05-19
Payer: MEDICARE

## 2025-05-19 ENCOUNTER — TELEPHONE (OUTPATIENT)
Dept: GENERAL RADIOLOGY | Facility: HOSPITAL | Age: 80
End: 2025-05-19

## 2025-05-19 ENCOUNTER — MED REC SCAN ONLY (OUTPATIENT)
Facility: CLINIC | Age: 80
End: 2025-05-19

## 2025-05-19 VITALS
BODY MASS INDEX: 35.68 KG/M2 | RESPIRATION RATE: 16 BRPM | HEIGHT: 66 IN | WEIGHT: 222 LBS | SYSTOLIC BLOOD PRESSURE: 110 MMHG | TEMPERATURE: 97 F | HEART RATE: 90 BPM | DIASTOLIC BLOOD PRESSURE: 73 MMHG

## 2025-05-19 DIAGNOSIS — R73.9 HYPERGLYCEMIA: ICD-10-CM

## 2025-05-19 DIAGNOSIS — Z01.818 PRE-OP EVALUATION: Primary | ICD-10-CM

## 2025-05-19 DIAGNOSIS — I10 ESSENTIAL HYPERTENSION: ICD-10-CM

## 2025-05-19 DIAGNOSIS — D50.9 IRON DEFICIENCY ANEMIA, UNSPECIFIED IRON DEFICIENCY ANEMIA TYPE: ICD-10-CM

## 2025-05-19 DIAGNOSIS — C50.911 INVASIVE DUCTAL CARCINOMA OF RIGHT BREAST (HCC): ICD-10-CM

## 2025-05-19 DIAGNOSIS — I25.10 CORONARY ARTERY DISEASE INVOLVING NATIVE CORONARY ARTERY OF NATIVE HEART, UNSPECIFIED WHETHER ANGINA PRESENT: ICD-10-CM

## 2025-05-19 DIAGNOSIS — N18.31 STAGE 3A CHRONIC KIDNEY DISEASE (HCC): ICD-10-CM

## 2025-05-19 DIAGNOSIS — E27.9 ADRENAL NODULE (HCC): ICD-10-CM

## 2025-05-19 DIAGNOSIS — E78.2 HYPERLIPIDEMIA, MIXED: ICD-10-CM

## 2025-05-19 DIAGNOSIS — R06.09 DOE (DYSPNEA ON EXERTION): ICD-10-CM

## 2025-05-19 DIAGNOSIS — E66.01 SEVERE OBESITY (BMI 35.0-35.9 WITH COMORBIDITY) (HCC): ICD-10-CM

## 2025-05-19 DIAGNOSIS — E55.9 VITAMIN D DEFICIENCY: ICD-10-CM

## 2025-05-19 DIAGNOSIS — Z01.818 PRE-OP EVALUATION: ICD-10-CM

## 2025-05-19 DIAGNOSIS — F32.A DEPRESSION, UNSPECIFIED DEPRESSION TYPE: ICD-10-CM

## 2025-05-19 PROBLEM — J42 CHRONIC BRONCHITIS, UNSPECIFIED CHRONIC BRONCHITIS TYPE (HCC): Status: RESOLVED | Noted: 2022-06-10 | Resolved: 2025-05-19

## 2025-05-19 LAB
ANION GAP SERPL CALC-SCNC: 8 MMOL/L (ref 0–18)
ATRIAL RATE: 87 BPM
BILIRUB UR QL STRIP.AUTO: NEGATIVE
BUN BLD-MCNC: 24 MG/DL (ref 9–23)
CALCIUM BLD-MCNC: 10.2 MG/DL (ref 8.7–10.6)
CHLORIDE SERPL-SCNC: 109 MMOL/L (ref 98–112)
CLARITY UR REFRACT.AUTO: CLEAR
CO2 SERPL-SCNC: 24 MMOL/L (ref 21–32)
CREAT BLD-MCNC: 1.23 MG/DL (ref 0.55–1.02)
EGFRCR SERPLBLD CKD-EPI 2021: 44 ML/MIN/1.73M2 (ref 60–?)
FASTING STATUS PATIENT QL REPORTED: YES
GLUCOSE BLD-MCNC: 96 MG/DL (ref 70–99)
GLUCOSE UR STRIP.AUTO-MCNC: NORMAL MG/DL
HYALINE CASTS #/AREA URNS AUTO: PRESENT /LPF
KETONES UR STRIP.AUTO-MCNC: NEGATIVE MG/DL
LEUKOCYTE ESTERASE UR QL STRIP.AUTO: 25
NITRITE UR QL STRIP.AUTO: NEGATIVE
OSMOLALITY SERPL CALC.SUM OF ELEC: 296 MOSM/KG (ref 275–295)
P AXIS: 41 DEGREES
P-R INTERVAL: 166 MS
PH UR STRIP.AUTO: 5 [PH] (ref 5–8)
POTASSIUM SERPL-SCNC: 4.4 MMOL/L (ref 3.5–5.1)
PROT UR STRIP.AUTO-MCNC: NEGATIVE MG/DL
Q-T INTERVAL: 376 MS
QRS DURATION: 84 MS
QTC CALCULATION (BEZET): 452 MS
R AXIS: 15 DEGREES
RBC UR QL AUTO: NEGATIVE
SODIUM SERPL-SCNC: 141 MMOL/L (ref 136–145)
SP GR UR STRIP.AUTO: 1.02 (ref 1–1.03)
T AXIS: 71 DEGREES
UROBILINOGEN UR STRIP.AUTO-MCNC: NORMAL MG/DL
VENTRICULAR RATE: 87 BPM

## 2025-05-19 PROCEDURE — 36415 COLL VENOUS BLD VENIPUNCTURE: CPT

## 2025-05-19 PROCEDURE — 87077 CULTURE AEROBIC IDENTIFY: CPT

## 2025-05-19 PROCEDURE — 87086 URINE CULTURE/COLONY COUNT: CPT

## 2025-05-19 PROCEDURE — 81001 URINALYSIS AUTO W/SCOPE: CPT

## 2025-05-19 PROCEDURE — 80048 BASIC METABOLIC PNL TOTAL CA: CPT

## 2025-05-19 NOTE — TELEPHONE ENCOUNTER
1550: Spoke with Korina Mosquera.  Introduced myself as one of the breast nurse coordinators at [facility] and informed Ms. Mosquera of the purpose of my call.  Discussed breast wire localization procedure to be done [facility] the [facility] prior to surgery with Dr. Duffy on Friday, May 30.  Procedure and flow of the day reviewed. Korina Mosquera verbalized understanding. No questions at this time.  Ms. Mosquera verbalized appreciation for the call.    Mom called requesting patient's last Tetanus and blood type

## 2025-05-19 NOTE — PROGRESS NOTES
Korina Mosquera is a 79 year old female who presents for a pre-operative physical exam. Patient is to have right breast bracketed lumpectomy to be done by Dr. Jessica Duffy at [facility] on May 30, 2025.     HPI:   Pt  for preop evaluation requested by the surgeon due to history of hypertension, hypercholesterolemia, coronary artery disease, obesity, Adrenal nodule, vitamin D deficiency, coronary artery disease, depression, CKD 3 .    Hypertension blood pressure stable doing well, continue present management.    Hypercholesterolemia on rosuvastatin 10 mg daily doing well with medication, monitor.    Coronary artery disease- asymptomatic patient seen cardiologist Dr. Schulte, she will be cleared for her surgery by her cardiologist.    Severe obesity - weight loss recommended, low-carb diet.    Adrenal nodule patient is asymptomaticic, stable.    Kidney disease stage III-good hydration monitor kidney function.     Vitamin D deficiency - on supplementation, continue present management.    Depression-stable ,  continue present management.    Patient denies any chest pain, no palpitations, no upper respiratory infection symptoms.    Patient denies any problems with anesthesia with her prior surgeries.       Current Medications[1]   Allergies: Allergies[2]   Past Medical History[3]   Past Surgical History[4]   Family History[5]   Social History:   Short Social Hx on File[6]        REVIEW OF SYSTEMS:   GENERAL: feels well otherwise  SKIN: denies any unusual skin lesions  EYES:denies blurred vision or double vision  HEENT: denies nasal congestion, sinus pain or ST  LUNGS: denies shortness of breath with exertion  CARDIOVASCULAR: denies chest pain on exertion  GI: denies abdominal pain,denies heartburn  : denies dysuria,  MUSCULOSKELETAL: denies back pain  NEURO: denies headaches  PSYCHE: denies depression or anxiety  HEMATOLOGIC: denies hx of anemia  ENDOCRINE: denies thyroid history  ALL/ASTHMA: denies hx  of allergy or asthma    EXAM:   /73 (BP Location: Left arm, Patient Position: Sitting, Cuff Size: adult)   Pulse 90   Temp 96.9 °F (36.1 °C) (Temporal)   Resp 16   Ht 5' 6\" (1.676 m)   Wt 222 lb (100.7 kg)   BMI 35.83 kg/m²   GENERAL: well developed, well nourished,in no apparent distress, patient is here with her daughter Kathe.  SKIN: no rashes,no suspicious lesions  HEENT: atraumatic, normocephalic,ears and throat are clear  EYES:PERRLA, EOMI, conjunctiva are clear  NECK: supple,no adenopathy  CHEST: no chest tenderness  BREAST: Deferred  LUNGS: clear to auscultation  CARDIO: RRR without murmur  GI: good BS's,no masses, HSM or tenderness  : deferred  RECTAL: Deferred  MUSCULOSKELETAL: back is not tender,FROM of the back  EXTREMITIES: no cyanosis, clubbing or edema  NEURO: Oriented times three,cranial nerves are intact,motor and sensory are grossly intact    Results for orders placed or performed in visit on 05/19/25   EKG with interpretation and Report -IN OFFICE [[id number]]    Collection Time: 05/19/25  9:49 AM   Result Value Ref Range    Ventricular rate 87 BPM    Atrial rate 87 BPM    P-R Interval 166 ms    QRS Duration 84 ms    Q-T Interval 376 ms    QTC Calculation (Bezet) 452 ms    P Axis 41 degrees    R Axis 15 degrees    T Axis 71 degrees      Normal sinus rhythm  Normal ECG  When compared with ECG of 19-DEC-2023 16:34,  No significant change was found  Confirmed by Hanna Avery ([id number]) on 5/19/2025 5:52:29 PM     Specimen Collected: 05/19/25  9:49 AM Last Resulted: 05/19/25  5:52 PM     BMP done 5/19/2025-creatinine 1.23, GFR 44, otherwise unremarkable.    CBC done 2/27/2025 normal.       ASSESSMENT AND PLAN:   Korina Mosquera is a 79 year old female who presents for a pre-operative physical exam.  Encounter Diagnoses   Name Primary?    Pre-op evaluation Yes    Invasive ductal carcinoma of right breast (HCC)     Essential hypertension     FERRELL (dyspnea on exertion)     Coronary  artery disease involving native coronary artery of native heart, unspecified whether angina present     Hyperlipidemia, mixed     Hyperglycemia     Iron deficiency anemia, unspecified iron deficiency anemia type     Depression, unspecified depression type     Adrenal nodule (HCC)     Vitamin D deficiency     Severe obesity (BMI 35.0-35.9 with comorbidity) (HCC)     Stage 3a chronic kidney disease (HCC)      Patient is in a  satisfactory condition and acceptable risk for planned surgery and anesthesia.    Patient will be  also cleared for her surgery by her cardiologist Dr. Schulte.    Orders Placed This Encounter   Procedures    Basic Metabolic Panel (8) [E]       Meds & Refills for this Visit:  Requested Prescriptions      No prescriptions requested or ordered in this encounter   Continue current meds.   Stop of the supplements 1 week prior to surgery  Keep good hydration.   DO NOT take Aspirin, Advil, Ibuprofen, Aleve , Motrin for 1 week prior surgery.   Take Tylenol  as needed for pain.   Contact your cardiologist  Dr. Schulte regarding clearance before your surgery.    Imaging & Consults:  ELECTROCARDIOGRAM, COMPLETE         This consult was sent back the referring physician, Dr.Christine Duffy.    Thank you very much for consultation.    The note was dictated using speech recognition software.  Accuracy and grammar in transcription may be subject to error.          [1]   Current Outpatient Medications   Medication Sig Dispense Refill    AMLODIPINE 5 MG Oral Tab TAKE 1 TABLET(5 MG) BY MOUTH DAILY 90 tablet 0    CELECOXIB 200 MG Oral Cap TAKE 1 CAPSULE(200 MG) BY MOUTH DAILY 90 capsule 0    rosuvastatin 10 MG Oral Tab       Benazepril HCl 20 MG Oral Tab Take 1 tablet (20 mg total) by mouth daily. 90 tablet 1    DULoxetine 20 MG Oral Cap DR Particles Take 1 capsule (20 mg total) by mouth daily. 90 capsule 1    DULoxetine 60 MG Oral Cap DR Particles Take 1 capsule (60 mg total) by mouth daily. 90 capsule 1     Metoprolol Succinate ER 25 MG Oral Tablet 24 Hr Take 1 tablet (25 mg total) by mouth in the morning.      Cholecalciferol (VITAMIN D) 1000 units Oral Tab Take by mouth. Daily except when she takes weekly 50,000 dose      Omega-3 Fatty Acids (FISH OIL OR) Take by mouth in the morning.     [2]   Allergies  Allergen Reactions    Codeine NAUSEA ONLY   [3]   Past Medical History:   Anxiety    Blood disorder    Anemia    Breast CA (HCC)    Breast cancer (HCC)    Her-2 positive    Breast cancer (HCC)    Her-2 positive    Breast cancer (HCC)    Her-2 positive    Cancer (HCC)    Depression    Exposure to medical diagnostic radiation    last tx 2015    Hearing impairment    hard of hearing, no hearing aids    High blood pressure    High cholesterol    Hyperlipidemia    IBS (irritable bowel syndrome)    Osteoarthritis    Personal history of antineoplastic chemotherapy    last tx 2015    Shortness of breath    Unspecified essential hypertension    Visual impairment    Readers   [4]   Past Surgical History:  Procedure Laterality Date    Back surgery  1970    slipped disc    Capsule endoscopy - internal referral  1/24/18= Normal    Chemotherapy  2014    Colon ca scrn not hi rsk ind N/A 12/11/2014    Procedure: ESOPHAGOGASTRODUODENOSCOPY, COLONOSCOPY, POSSIBLE BIOPSY, POSSIBLE POLYPECTOMY 83977,78112;  Surgeon: Mikie Stock MD;  [place] [facility] [place] [facility]    Colonoscopy  1/9/18= Hemorrhoids    Repeat PRN    Colonoscopy N/A 01/09/2018    Procedure: COLONOSCOPY;  Surgeon: Onur Levi MD;  Location:  ENDOSCOPY    Lumpectomy left  05/2014    IDC -done at [facility]    Raymond biopsy stereo nodule 1 site right (cpt=19081)  03/2015    benign    Raymond biopsy stereo nodule 1 site right (cpt=19081)  02/2022    radial scars    Patient documented not to have experienced any of the following events N/A 12/11/2014    Procedure: ESOPHAGOGASTRODUODENOSCOPY, COLONOSCOPY, POSSIBLE BIOPSY, POSSIBLE POLYPECTOMY  [id number];  Surgeon: Mikie Stock MD;  Location: [facility]    Patient withough preoperative order for iv antibiotic surgical site infection prophylaxis. N/A 2014    Procedure: ESOPHAGOGASTRODUODENOSCOPY, COLONOSCOPY, POSSIBLE BIOPSY, POSSIBLE POLYPECTOMY 08835,48586;  Surgeon: Mikie Stock MD;  Location: [facility]    Radiation left      Spine surgery procedure unlisted      Upper gi endoscopy,biopsy N/A 2014    Procedure: ESOPHAGOGASTRODUODENOSCOPY, COLONOSCOPY, POSSIBLE BIOPSY, POSSIBLE POLYPECTOMY 39435,32681;  Surgeon: Mikie Stock MD;  Location: [facility]    Upper gi endoscopy,biopsy  18= Hiatal hernia. SB Bx normal    Upper gi endoscopy,exam     [5]   Family History  Problem Relation Age of Onset    Heart Attack Father     Other (pneumonia) Mother     Other (unknown cause 32) Brother     Breast Cancer Sister 71    Other (COPD) Sister     Breast Cancer Paternal Aunt         80's    Breast Cancer Paternal Cousin Female         early 30's    Breast Cancer Self 68   [6]   Social History  Socioeconomic History    Marital status:     Number of children: 2   Occupational History    Occupation: Retired Nurse    Tobacco Use    Smoking status: Former     Current packs/day: 0.00     Average packs/day: 1 pack/day for 20.0 years (20.0 ttl pk-yrs)     Types: Cigarettes     Start date: 10/10/1973     Quit date: 10/10/1993     Years since quittin.6    Smokeless tobacco: Never    Tobacco comments:     Updated 24   Vaping Use    Vaping status: Never Used   Substance and Sexual Activity    Alcohol use: No    Drug use: No   Other Topics Concern     Service No    Blood Transfusions No    Caffeine Concern No    Occupational Exposure No    Hobby Hazards No    Sleep Concern No    Stress Concern No    Weight Concern Yes    Special Diet No    Back Care Yes    Seat Belt Yes   Social History Narrative    Lives alone in [place]  in a townhouse. Two daughters live close by.     Social Drivers of Health     Food Insecurity: No Food Insecurity (3/5/2024)    Food Insecurity     Food Insecurity: Never true   Transportation Needs: No Transportation Needs (3/5/2024)    Transportation Needs     Lack of Transportation: No   Stress: No Stress Concern Present (3/5/2024)    Stress     Feeling of Stress : No   Housing Stability: Low Risk  (3/5/2024)    Housing Stability     Housing Instability: No

## 2025-05-19 NOTE — PATIENT INSTRUCTIONS
Continue current meds.   Stop of the supplements 1 week prior to surgery  Keep good hydration.   DO NOT take Aspirin, Advil, Ibuprofen, Aleve , Motrin for 1 week prior surgery.   Take Tylenol  as needed for pain.   Contact your cardiologist  Dr. Schulte regarding clearance before your surgery.

## 2025-05-22 ENCOUNTER — NURSE ONLY (OUTPATIENT)
Age: 80
End: 2025-05-22
Attending: FAMILY MEDICINE
Payer: MEDICARE

## 2025-05-22 ENCOUNTER — OFFICE VISIT (OUTPATIENT)
Age: 80
End: 2025-05-22
Attending: FAMILY MEDICINE
Payer: MEDICARE

## 2025-05-22 DIAGNOSIS — C50.911 MALIGNANT NEOPLASM OF RIGHT BREAST IN FEMALE, ESTROGEN RECEPTOR POSITIVE, UNSPECIFIED SITE OF BREAST (HCC): Primary | ICD-10-CM

## 2025-05-22 DIAGNOSIS — Z85.3 PERSONAL HISTORY OF BREAST CANCER: ICD-10-CM

## 2025-05-22 DIAGNOSIS — Z17.0 MALIGNANT NEOPLASM OF RIGHT BREAST IN FEMALE, ESTROGEN RECEPTOR POSITIVE, UNSPECIFIED SITE OF BREAST (HCC): Primary | ICD-10-CM

## 2025-05-22 DIAGNOSIS — Z80.3 FAMILY HISTORY OF BREAST CANCER IN SISTER: ICD-10-CM

## 2025-05-22 NOTE — PROGRESS NOTES
Referring Provider:  Jessica Duffy MD    Additional Provider(s):  MD Hanna Ceja MD    Reason for Referral:  Korina \"Ward\" Eveline was referred for genetic counseling because of a new diagnosis of breast cancer. Ms. Mosquera is a 79 year-old woman of [place], [place], and [place] descent who was diagnosed with an ER/LA-positive, HER2-negative invasive carcinoma of the right breast on 25. Ms. Mosquera's medical history is notable for a left-sided HER2-positive breast cancer at age 71 for which she had a lumpectomy, chemotherapy, and radiation treatment. Ms. Mosquera's uterus and ovaries are intact. Ms. Mosquera's 16 colonoscopy was negative for polyps.      Social History:  Ms. Mosquera was seen today with her daughter, Bethany. Ms. Mosquera lives in [place].     Family History:   A three generation pedigree was obtained.      Ms. Mosquera has two daughters, age 53 and 55, and a 64 year-old son. Ms. Mosquera is not in contact with her son. Ms. Mosquera's daughters have not had cancer. Ms. Mosquera has five grandchildren.    Ms. Mosquera had four brothers and two sisters. Ms. Mosquera's sister, Ade had breast cancer in her early 70s and  at age 73 from COPD. One of Ms. Mosquera's brothers  in his late 60s from liver cancer.     Ms. Mosquera's mother  at age 54 and did not have cancer. Ms. Mosquera's mother was an only child. Ms. Mosquera's maternal grandmother  at an unspecified age from an unspecified infection. Ms. Mosquera's maternal grandfather  at an unspecified older age and did not have cancer.     Ms. Mosquera's father  at age 54 and did not have cancer. Ms. Mosquera's father had 11 or 12 siblings. One of Ms. Mosquera's paternal aunts had breast cancer in her 80s. One of Ms. Mosquera's paternal uncles  from gastric cancer at an unspecified older age. One of Ms. Mosquera's paternal first  cousins, once removed had breast cancer at an unspecified age. Ms. Mosquera's paternal grandparents  in their 80s and did not have cancer.      Please see the pedigree for additional family history information.     Counseling:   The following information was discussed with Ms. Mosquera and her daughter.     Genetics of Breast Cancer:  In the [place], approximately 1 in 8 women will develop breast cancer. Although the majority of breast cancer cases are sporadic, approximately 5-10% of women with breast cancer have a hereditary cancer syndrome.  Signs of a hereditary cancer syndrome include some rare cancers, common cancers occurring at unusually young ages, multiple primary cancers in the same individual, or the same type of cancer or related cancers (e.g., breast and ovarian, colorectal and endometrial) in three or more individuals in the same lineage.  Mutations in the genes, BRCA1 and BRCA2, account for the majority of hereditary breast and ovarian cancer families.  Mutations in genes other than BRCA1/2, many of which now have medical management recommendations (e.g., CAROL, CHEK2, PALB2) are identified in 3-10% of individuals tested using a multigene panel.      Risk Assessment:   Ms. Mosquera meets NCCN Guidelines testing criteria for high-penetrance breast cancer susceptibility genes based on her personal history of bilateral breast cancer and reported family history of breast cancer in her sister and paternal aunt. Risk assessment to estimate the chance of Ms. Mosquera harboring a BRCA1/2 pathogenic variant was done by using the Rei II Model. Based on this model, Ms. Edwardss risk of carrying a BRCA1/2 pathogenic variant is estimated to be 4%. It is important to note that all prediction models have limitations and medical management should be based on clinical judgment, and personal and family history. In addition, there are no prediction models or testing criteria for moderate penetrance  cancer predisposition genes such as CAROL and CHEK2. I recommend that testing be performed as part of a multigene panel.     Genetic Testing (Panel):  The pros, cons, and limitations of genetic testing were discussed including the potential implications of test results on clinical management.     If a pathogenic variant is not identified (negative result), it is still possible that Ms. Mosquera has a pathogenic variant in one of these genes that was not detected by the genetic test, or that the family is dealing with a hereditary cancer syndrome involving a different gene. It is also possible that Ms. oMsquera's relatives have a pathogenic variant in one of these genes that Ms. Mosquera did not inherit. In this scenario, options for cancer screening/management should be determined according to personal and family histories and should be discussed with a physician.      A variant of uncertain significance is a DNA change that may or may not alter the function of the gene; therefore, it is usually not possible to determine if the gene variant is responsible for an individual's increased cancer risk.     If Ms. Mosquera is found to carry a pathogenic variant in a cancer predisposition gene, she is at significantly increased risk for various cancers. The magnitude of these risks, and the cancers for which she is at increased risk would depend on the gene involved. Medical recommendations for individuals with BRCA1/2 pathogenic variants were reviewed as an example. It was also explained that for some of the genes for which testing is available, the associated cancer risks have yet to be determined and medical management recommendations may not yet be available for individuals with pathogenic variants in these genes. If she were to test positive for a pathogenic variant, her children and siblings would each have a 50% chance of carrying the same variant. At-risk adults (>18) would have the option of pursuing  targeted genetic testing to clarify their cancer risks. Genetic test results have implications for the entire biological family. Thus, it is recommended that she share her genetic test results with her biological family members so that they may have their risk assessed.     Genetic Information Non-Discrimination Act:  The legal protections of the Genetic Information Nondiscrimination Act (NALINI) for health insurance and employment were discussed.  NALINI does not provide protection for life insurance, disability or long-term care insurance.    Summary and Plan:  Ms. Mosquera was referred for genetic counseling because of a new diagnosis of breast cancer. Her reported family history is somewhat suspicious for a hereditary cancer syndrome. Genetic testing on Ms. Mosquera for breast cancer susceptibility genes is indicated.      At the conclusion of the counseling session Ms. Mosquera decided to proceed with genetic testing. Written consent was obtained. Blood and paperwork were sent to [facility] for their [facility] Breast Cancer STAT panel (CAROL, BRCA1, BRCA2, CDH1, CHEK2, PALB2, PTEN, STK11, and TP53). I anticipate that Ms. Mosquera's results will be available within 6-13 days and will call her with the results.  Results will also be communicated to Dr. Duffy, Dr. Gunn, and Dr. Avery.    Approximately 40 minutes was spent in coordination of Ms. Mosquera's care.

## 2025-05-27 ENCOUNTER — PATIENT OUTREACH (OUTPATIENT)
Dept: CASE MANAGEMENT | Age: 80
End: 2025-05-27

## 2025-05-29 ENCOUNTER — GENETICS ENCOUNTER (OUTPATIENT)
Age: 80
End: 2025-05-29

## 2025-05-29 ENCOUNTER — ANESTHESIA EVENT (OUTPATIENT)
Dept: SURGERY | Facility: HOSPITAL | Age: 80
End: 2025-05-29
Payer: MEDICARE

## 2025-05-29 NOTE — IMAGING NOTE
Assisted  with mammography guided bracketed needle localization of the right breast.   1355: Korina Mosquera arrived in the mammography department.   Korina Mosquera identified with spelling of name and date of birth.   Medications and allergies reviewed. The following allergies were reported  CodeineNAUSEA ONLY    History: right breast- Invasive ductal carcinoma   Surgery: Right breast wire bracketed localized lumpectomy     Order verified.  Procedure explained and questions answered. Korina Stephencolby verbalized understanding and agreement.  Educational material provided   1400: Written consent obtained.     1405: Scans taken by Misty- mammography technologist    1409: Dr. Sylvester  present    1409: Time out complete.    Site #1 Right Breast   1406: Site prepped in a sterile manner.   1410: Lidocaine administered for anesthetic affect.  1410: Lu 20G x 7.5cm needle placed- 1st site:  Right breast, 10 o'clock position, 6 cm from the nipple, Q shaped clip, biopsy demonstrates invasive ductal carcinoma.     Site #2 Right  Breast  1406: Site prepped in a sterile manner.   1411: Lidocaine administered for anesthetic affect.  1412: Lu 20G x 7.5cm needle placed- 1st site: Right breast, 10 o'clock position, 5 cm from the nipple, pellet shaped clip, biopsy demonstrates invasive mammary carcinoma.    Emotional support provided.  Korina Mosquera tolerated procedure well.     Sites cleaned.  Wires secured with blue clips, steri strips, sterile 4x4 gauze dressing, and Tegaderm, and paper tape.     Korina Mosquera transported via wheelchair to pre-op/surgery holding in stable condition. Ms. Mosquera  without complaints or concerns at this time.

## 2025-05-29 NOTE — PROGRESS NOTES
Referring Provider:                    Jessica Duffy MD     Additional Provider(s):              MD Hanna Ceja MD     Reason for Referral:  Korina \"Ward\" Eveline had genetic testing performed on 05/22/25 because of a new diagnosis of breast cancer at age 79 and a personal history of contralateral breast cancer at age 71.     Genetic Testing Result:  NEGATIVE - No known pathogenic variants were found in the following 9 genes: CAROL, BRCA1, BRCA2, CDH1, CHEK2, PALB2, PTEN, STK11, and TP53.  Please refer to the report from [facility] ([id number]) for additional testing information. These results were discussed with Ms. Mosquera by phone on 05/29/25.      Summary and Plan:  These results indicate that it is unlikely that Ms. Mosquera has a pathogenic variant in any of the genes listed above. The limitations of the testing include the chance that a pathogenic variant in a gene other than those included in this analysis might be the cause of cancer in Ms. Mosquera or her relatives. Reflex testing is pending.

## 2025-05-30 ENCOUNTER — HOSPITAL ENCOUNTER (OUTPATIENT)
Facility: HOSPITAL | Age: 80
Setting detail: HOSPITAL OUTPATIENT SURGERY
Discharge: HOME | End: 2025-05-30
Attending: SURGERY | Admitting: SURGERY
Payer: MEDICARE

## 2025-05-30 ENCOUNTER — ANESTHESIA (OUTPATIENT)
Dept: SURGERY | Facility: HOSPITAL | Age: 80
End: 2025-05-30
Payer: MEDICARE

## 2025-05-30 ENCOUNTER — HOSPITAL ENCOUNTER (OUTPATIENT)
Dept: MAMMOGRAPHY | Facility: HOSPITAL | Age: 80
Discharge: HOME | End: 2025-05-30
Attending: SURGERY | Admitting: SURGERY
Payer: MEDICARE

## 2025-05-30 VITALS
OXYGEN SATURATION: 98 % | BODY MASS INDEX: 35.84 KG/M2 | TEMPERATURE: 98 F | WEIGHT: 223 LBS | SYSTOLIC BLOOD PRESSURE: 140 MMHG | DIASTOLIC BLOOD PRESSURE: 78 MMHG | RESPIRATION RATE: 18 BRPM | HEIGHT: 66 IN | HEART RATE: 71 BPM

## 2025-05-30 DIAGNOSIS — C50.911 INVASIVE DUCTAL CARCINOMA OF RIGHT BREAST (HCC): ICD-10-CM

## 2025-05-30 DIAGNOSIS — C50.911 INVASIVE DUCTAL CARCINOMA OF RIGHT BREAST (HCC): Primary | ICD-10-CM

## 2025-05-30 PROCEDURE — 88342 IMHCHEM/IMCYTCHM 1ST ANTB: CPT | Performed by: SURGERY

## 2025-05-30 PROCEDURE — 88341 IMHCHEM/IMCYTCHM EA ADD ANTB: CPT | Performed by: SURGERY

## 2025-05-30 PROCEDURE — 88305 TISSUE EXAM BY PATHOLOGIST: CPT | Performed by: SURGERY

## 2025-05-30 PROCEDURE — 19281 PERQ DEVICE BREAST 1ST IMAG: CPT | Performed by: SURGERY

## 2025-05-30 PROCEDURE — 76098 X-RAY EXAM SURGICAL SPECIMEN: CPT | Performed by: SURGERY

## 2025-05-30 PROCEDURE — 88344 IMHCHEM/IMCYTCHM EA MLT ANTB: CPT | Performed by: SURGERY

## 2025-05-30 PROCEDURE — 88307 TISSUE EXAM BY PATHOLOGIST: CPT | Performed by: SURGERY

## 2025-05-30 PROCEDURE — 19282 PERQ DEVICE BREAST EA IMAG: CPT | Performed by: SURGERY

## 2025-05-30 RX ORDER — HYDROCODONE BITARTRATE AND ACETAMINOPHEN 5; 325 MG/1; MG/1
1-2 TABLET ORAL EVERY 6 HOURS PRN
Qty: 20 TABLET | Refills: 0 | Status: SHIPPED | OUTPATIENT
Start: 2025-05-30

## 2025-05-30 RX ORDER — DIAZEPAM 5 MG/1
5 TABLET ORAL EVERY 30 MIN PRN
Status: DISCONTINUED | OUTPATIENT
Start: 2025-05-30 | End: 2025-05-30 | Stop reason: HOSPADM

## 2025-05-30 RX ORDER — HYDROMORPHONE HYDROCHLORIDE 1 MG/ML
0.4 INJECTION, SOLUTION INTRAMUSCULAR; INTRAVENOUS; SUBCUTANEOUS EVERY 5 MIN PRN
Status: DISCONTINUED | OUTPATIENT
Start: 2025-05-30 | End: 2025-05-30

## 2025-05-30 RX ORDER — LIDOCAINE HYDROCHLORIDE 10 MG/ML
INJECTION, SOLUTION EPIDURAL; INFILTRATION; INTRACAUDAL; PERINEURAL AS NEEDED
Status: DISCONTINUED | OUTPATIENT
Start: 2025-05-30 | End: 2025-05-30 | Stop reason: SURG

## 2025-05-30 RX ORDER — ACETAMINOPHEN 500 MG
1000 TABLET ORAL ONCE
Status: DISCONTINUED | OUTPATIENT
Start: 2025-05-30 | End: 2025-05-30 | Stop reason: HOSPADM

## 2025-05-30 RX ORDER — ACETAMINOPHEN 500 MG
1000 TABLET ORAL ONCE AS NEEDED
Status: DISCONTINUED | OUTPATIENT
Start: 2025-05-30 | End: 2025-05-30

## 2025-05-30 RX ORDER — HEPARIN SODIUM 5000 [USP'U]/ML
5000 INJECTION, SOLUTION INTRAVENOUS; SUBCUTANEOUS ONCE
Status: DISCONTINUED | OUTPATIENT
Start: 2025-05-30 | End: 2025-05-30

## 2025-05-30 RX ORDER — HYDROCODONE BITARTRATE AND ACETAMINOPHEN 5; 325 MG/1; MG/1
2 TABLET ORAL ONCE AS NEEDED
Status: DISCONTINUED | OUTPATIENT
Start: 2025-05-30 | End: 2025-05-30

## 2025-05-30 RX ORDER — SODIUM CHLORIDE, SODIUM LACTATE, POTASSIUM CHLORIDE, CALCIUM CHLORIDE 600; 310; 30; 20 MG/100ML; MG/100ML; MG/100ML; MG/100ML
INJECTION, SOLUTION INTRAVENOUS CONTINUOUS
Status: DISCONTINUED | OUTPATIENT
Start: 2025-05-30 | End: 2025-05-30

## 2025-05-30 RX ORDER — HYDROCODONE BITARTRATE AND ACETAMINOPHEN 5; 325 MG/1; MG/1
1 TABLET ORAL ONCE AS NEEDED
Status: DISCONTINUED | OUTPATIENT
Start: 2025-05-30 | End: 2025-05-30

## 2025-05-30 RX ORDER — NALOXONE HYDROCHLORIDE 0.4 MG/ML
80 INJECTION, SOLUTION INTRAMUSCULAR; INTRAVENOUS; SUBCUTANEOUS AS NEEDED
Status: DISCONTINUED | OUTPATIENT
Start: 2025-05-30 | End: 2025-05-30

## 2025-05-30 RX ORDER — HYDROMORPHONE HYDROCHLORIDE 1 MG/ML
0.6 INJECTION, SOLUTION INTRAMUSCULAR; INTRAVENOUS; SUBCUTANEOUS EVERY 5 MIN PRN
Status: DISCONTINUED | OUTPATIENT
Start: 2025-05-30 | End: 2025-05-30

## 2025-05-30 RX ORDER — BUPIVACAINE HYDROCHLORIDE 5 MG/ML
INJECTION, SOLUTION EPIDURAL; INTRACAUDAL; PERINEURAL AS NEEDED
Status: DISCONTINUED | OUTPATIENT
Start: 2025-05-30 | End: 2025-05-30 | Stop reason: HOSPADM

## 2025-05-30 RX ORDER — HYDROMORPHONE HYDROCHLORIDE 1 MG/ML
0.2 INJECTION, SOLUTION INTRAMUSCULAR; INTRAVENOUS; SUBCUTANEOUS EVERY 5 MIN PRN
Status: DISCONTINUED | OUTPATIENT
Start: 2025-05-30 | End: 2025-05-30

## 2025-05-30 RX ORDER — LIDOCAINE HYDROCHLORIDE AND EPINEPHRINE 10; 10 MG/ML; UG/ML
INJECTION, SOLUTION INFILTRATION; PERINEURAL AS NEEDED
Status: DISCONTINUED | OUTPATIENT
Start: 2025-05-30 | End: 2025-05-30 | Stop reason: HOSPADM

## 2025-05-30 RX ADMIN — SODIUM CHLORIDE, SODIUM LACTATE, POTASSIUM CHLORIDE, CALCIUM CHLORIDE: 600; 310; 30; 20 INJECTION, SOLUTION INTRAVENOUS at 16:26:00

## 2025-05-30 RX ADMIN — SODIUM CHLORIDE, SODIUM LACTATE, POTASSIUM CHLORIDE, CALCIUM CHLORIDE: 600; 310; 30; 20 INJECTION, SOLUTION INTRAVENOUS at 15:41:00

## 2025-05-30 RX ADMIN — LIDOCAINE HYDROCHLORIDE 50 MG: 10 INJECTION, SOLUTION EPIDURAL; INFILTRATION; INTRACAUDAL; PERINEURAL at 15:47:00

## 2025-05-30 RX ADMIN — SODIUM CHLORIDE, SODIUM LACTATE, POTASSIUM CHLORIDE, CALCIUM CHLORIDE: 600; 310; 30; 20 INJECTION, SOLUTION INTRAVENOUS at 15:55:00

## 2025-05-30 NOTE — ANESTHESIA POSTPROCEDURE EVALUATION
[facility]    Korina Mosquera Patient Status:  Hospital Outpatient Surgery   Age/Gender 79 year old female MRN [id number]   Location [facility] SURGERY Attending Jessica Duffy MD   Hosp Day # 0 PCP Hanna Avery MD       Anesthesia Post-op Note    Right breast wire bracketed localized lumpectomy    Procedure Summary       Date: 05/30/25 Room / Location:  MAIN OR 11 / [facility] MAIN OR    Anesthesia Start: 1541 Anesthesia Stop: 1644    Procedure: Right breast wire bracketed localized lumpectomy (Right: Breast) Diagnosis:       Invasive ductal carcinoma of right breast (HCC)      (Invasive ductal carcinoma of right breast (HCC) [C50.911])    Surgeons: Jessica Duffy MD Anesthesiologist: Gopi Montilla MD    Anesthesia Type: MAC ASA Status: 3            Anesthesia Type: MAC    Vitals Value Taken Time   /72 05/30/25 16:44   Temp 97.5 05/30/25 16:44   Pulse 74 05/30/25 16:44   Resp 18 05/30/25 16:44   SpO2 98% (RA) 05/30/25 16:44           Patient Location: Same Day Surgery    Anesthesia Type: MAC    Airway Patency: patent    Postop Pain Control: adequate    Mental Status: preanesthetic baseline    Nausea/Vomiting: none    Cardiopulmonary/Hydration status: stable euvolemic    Complications: no apparent anesthesia related complications    Postop vital signs: stable    Dental Exam: Unchanged from Preop    Patient to be discharged home when criteria met.

## 2025-05-30 NOTE — BRIEF OP NOTE
Pre-Operative Diagnosis: Invasive ductal carcinoma of right breast (HCC) [C50.911]     Post-Operative Diagnosis: Invasive ductal carcinoma of right breast (HCC) [C50.911]      Procedure Performed:   Right breast wire bracketed localized lumpectomy    Surgeons and Role:     * Jessica Duffy MD - Primary    Assistant(s):   Ivette Faye     Surgical Findings: Clips noted on specimen radiogram     Specimen: R bracketed lumpectomy, R breast margins x6     Estimated Blood Loss: 5cc    Jessica Duffy MD  5/30/2025  3:05 PM

## 2025-05-30 NOTE — H&P
History of Present Illness:   Ms. Korina Mosquera is a 79 year old woman who presents with imaging detected right breast cancer.  The patient denies any palpable masses, nipple discharge, skin changes or axillary symptoms.  She does have a personal history of a HER2 positive breast cancer at the age of 71 years old for which she underwent lumpectomy, chemotherapy and radiation therapy.  She did not have prior genetic testing.  Her bilateral diagnostic surveillance in April 2024 was unremarkable.  She presented for a screening on March 31, 2025 and was noted to have a new asymmetry in the right breast.  She had a diagnostic evaluation of the right side on April 23, 2025 and was confirmed to have 2 masses at 10:00 6 cm from the nipple and 5 cm from the nipple measuring 1.7 cm and 5 mm respectively and which were 1.1 cm apart from each other for which she underwent a 2 site ultrasound-guided biopsy on April 30, 2025.  She was noted to have malignancy at both locations that was ER/AL positive, HER2/emily negative with a Ki-67 of 15 to 25%. She is here today for evaluation and recommendations for further therapy.        [Past Medical History]    [Past Medical History]   Anxiety    Blood disorder     Anemia    Breast CA (HCC)    Breast cancer (HCC)     Her-2 positive    Breast cancer (HCC)     Her-2 positive    Breast cancer (HCC)     Her-2 positive    Cancer (HCC)    Depression    Exposure to medical diagnostic radiation     last tx 2015    Hearing impairment     hard of hearing, no hearing aids    High blood pressure    High cholesterol    Hyperlipidemia    IBS (irritable bowel syndrome)    Osteoarthritis    Personal history of antineoplastic chemotherapy     last tx 2015    Shortness of breath    Unspecified essential hypertension    Visual impairment     Readers        [Past Surgical History]    [Past Surgical History]        Procedure Laterality Date    Back surgery   1970     slipped disc    Capsule endoscopy  - internal referral   18= Normal    Chemotherapy       Colon ca scrn not hi rsk ind N/A 2014     Procedure: ESOPHAGOGASTRODUODENOSCOPY, COLONOSCOPY, POSSIBLE BIOPSY, POSSIBLE POLYPECTOMY 67553,97846;  Surgeon: Mikie Stock MD;  Location: [facility]    Colonoscopy   18= Hemorrhoids     Repeat PRN    Colonoscopy N/A 2018     Procedure: COLONOSCOPY;  Surgeon: Onur Levi MD;  Location:  ENDOSCOPY    Lumpectomy left   2014     ID[facility] -done at [facility]    Raymond biopsy stereo nodule 1 site right (cpt=19081)   2015     benign    Raymond biopsy stereo nodule 1 site right (cpt=19081)   2022     radial scars    Patient documented not to have experienced any of the following events N/A 2014     Procedure: ESOPHAGOGASTRODUODENOSCOPY, COLONOSCOPY, POSSIBLE BIOPSY, POSSIBLE POLYPECTOMY 95401,06473;  Surgeon: Mikie Stock MD;  Location: [facility]    Patient withough preoperative order for iv antibiotic surgical site infection prophylaxis. N/A 2014     Procedure: ESOPHAGOGASTRODUODENOSCOPY, COLONOSCOPY, POSSIBLE BIOPSY, POSSIBLE POLYPECTOMY 35753,96963;  Surgeon: Mikie Stock MD;  Location: [facility]    Radiation left       Spine surgery procedure unlisted        Upper gi endoscopy,biopsy N/A 2014     Procedure: ESOPHAGOGASTRODUODENOSCOPY, COLONOSCOPY, POSSIBLE BIOPSY, POSSIBLE POLYPECTOMY 65190,02589;  Surgeon: Mikie Stock MD;  Location: [facility]    Upper gi endoscopy,biopsy   18= Hiatal hernia. SB Bx normal    Upper gi endoscopy,exam            Gynecological History:  Pt is a   Pt was 26 years old at time of first pregnancy.    She denies any cumulative breastfeeding history   She achieved menarche at age 12 and LMP age 54  Age of Menopause: 54  Type: natural menopause  She denies any history of hormone replacement therapy   She has history of oral contraceptive use for 2  years.  She denies infertility treatment to achieve pregnancy.     Medications:    [Current Medications]    [Current Medications]  No outpatient medications have been marked as taking for the 5/13/25 encounter (Appointment) with Jessica Duffy MD.        Allergies:    [Allergies]    [Allergies]       Allergen Reactions    Codeine NAUSEA ONLY    Hm Lidocaine Patch [Lidocaine] ITCHING        Family History:   [Family History]    [Family History]        Problem Relation Age of Onset    Heart Attack Father      Other (pneumonia) Mother      Other (unknown cause 32) Brother      Breast Cancer Sister 71    Other (COPD) Sister      Breast Cancer Paternal Aunt           80's    Breast Cancer Paternal Cousin Female           early 30's    Breast Cancer Self 68        She is not of Ashkenazi Anabaptist ancestry.     Social History:      History   Alcohol Use No              History   Smoking Status    Former    Types: Cigarettes   Smokeless Tobacco    Never      Ms. Korina Mosquera is  with 6 children. She has 3 siblings. She is currently Retired     Review of Systems:  General:   The patient denies, fever, chills, night sweats, fatigue, generalized weakness, change in appetite or weight loss.     HEENT:     The patient denies eye irritation, +cataracts, redness, glaucoma, yellowing of the eyes, change in vision, color blindness, or wearing contacts/glasses. The patient denies hearing loss, ringing in the ears, ear drainage, earaches, nasal congestion, nose bleeds, snoring, pain in mouth/throat, hoarseness, change in voice, facial trauma.     Respiratory:  The patient denies chronic cough, phlegm, hemoptysis, pleurisy/chest pain, pneumonia, asthma, wheezing, difficulty in breathing with exertion, emphysema, chronic bronchitis, shortness of breath or abnormal sound when breathing.      Cardiovascular:  There is no history of chest pain, chest pressure/discomfort, palpitations, irregular heartbeat, fainting or  near-fainting, difficulty breathing when lying flat, SOB/Coughing at night, swelling of the legs or chest pain while walking.     Breasts:  See history of present illness     Gastrointestinal:     There is no history of difficulty or pain with swallowing, reflux symptoms, vomiting, dark or bloody stools, constipation, yellowing of the skin, indigestion, nausea, change in bowel habits, diarrhea, abdominal pain or vomiting blood.      Genitourinary:  The patient denies frequent urination, needing to get up at night to urinate, urinary hesitancy or retaining urine, painful urination, urinary incontinence, decreased urine stream, blood in the urine or vaginal/penile discharge.     Skin:    The patient denies rash, itching, skin lesions, dry skin, change in skin color or change in moles.      Hematologic/Lymphatic:  The patient denies easily bruising or bleeding or persistent swollen glands or lymph nodes.      Musculoskeletal:  The patient denies muscle aches/pain, joint pain, +stiff joints, neck pain, back pain or bone pain.     Neuropsychiatric:  There is no history of migraines or severe headaches, seizure/epilepsy, speech problems, coordination problems, trembling/tremors, fainting/black outs, dizziness, memory problems, loss of sensation/numbness, problems walking, weakness, tingling or burning in hands/feet. There is no history of abusive relationship, bipolar disorder, sleep disturbance, anxiety, +depression or feeling of despair.     Endocrine:    There is no history of poor/slow wound healing, weight loss/gain, fertility or hormone problems, cold intolerance, thyroid disease.      Allergic/Immunologic:  There is no history of hives, hay fever, angioedema or anaphylaxis.     /82 (BP Location: Right arm, Patient Position: Sitting, Cuff Size: adult)   Pulse 97   Resp 16   Ht 1.676 m (5' 6\")   SpO2 93%   BMI 38.09 kg/m²      Physical Exam:  The patient is an alert, oriented, well-nourished and   well-developed woman who appears her stated age. Her speech patterns and movements are normal. Her affect is appropriate.     HEENT: The head is normocephalic. The neck is supple. The thyroid is not enlarged and is without palpable masses/nodules. There are no palpable masses. The trachea is in the midline. Conjunctiva are clear, non-icteric.     Chest: The chest expands symmetrically. The lungs are clear to auscultation.     Heart: The rhythm is regular.  There are no murmurs, rubs, gallops or thrills.     Breasts:  Her breasts are symmetrical with a cup size 42B.  Right breast: The skin, nipple ,and areola appear normal. There is no skin dimpling with movement of the pectoralis. There is no nipple retraction. No nipple discharge can be elicited. The parenchyma is mildly nodular. There are no dominant masses in the breast. The axillary tail is normal.  Prior incision on the breast with no underlying palpable concerns.  Left breast:   The skin, nipple, and areola appear normal. There is no skin dimpling with movement of the pectoralis. There is no nipple retraction. No nipple discharge can be elicited. The parenchyma is mildly nodular. There are no dominant masses in the breast. The axillary tail is normal.     Abdomen:  The abdomen is soft, flat and non tender. The liver is not enlarged. There are no palpable masses.     Lymph Nodes:  The supraclavicular, axillary and cervical regions are free of significant lymphadenopathy.     Back: There is no vertebral column tenderness.     Skin: The skin appears normal. There are no suspicious appearing rashes or lesions.     Extremities: The extremities are without deformity, cyanosis or edema.     Impression:   Ms. Korina Mosquera is a 79 year old woman presents with history of a HER2 positive right breast cancer with new diagnosis of ipsilateral right breast multifocal breast cancer, clinical stage T1 (M) NXMX.     Discussion and Plan:  I had a discussion with the  Patient regarding her breast exam. On exam today I found doing well since the biopsies was no other clinical findings.  I personally reviewed the recent imaging and pathology and we discussed this at length.     The natural history and evolution of breast cancer were discussed with Ms. Korina Mosquera and her  family, including the difference between in-situ and invasive carcinoma, and the distinction  between local and systemic disease and local and systemic therapy. For local treatment options,  I explained the risks and benefits of breast conservation and mastectomy (with or without  reconstruction), including the fact that survival rates are equal with these two approaches.  If breast conservation is elected, I explained the need for free margins, the possibility of re-  excision to achieve free margins, and the need for post-operative radiotherapy. The approach  to patrizia staging was also described, including the technique, risks and benefits of sentinel node  biopsy, the possible need for axillary dissection, and the long-term sequelae of this procedure.  With regard to systemic therapy, final recommendation will be made following receipt of final  pathology post-op, but I outlined the possibility of endocrine therapy, chemotherapy, and  herceptin, depending on tumor marker profile.  Following this discussion, where all of the patient's questions were answered, we agreed to  proceed with genetic testing given her prior history of new diagnosis though the patient states this will not influence her surgical decision making.  Though she has had prior radiation and we discussed the end of care in this setting would be a completion mastectomy due to age and comorbidities she believes that the operation would be very difficult.  We did discuss possibly ordering an MRI given the multifocal nature of this however due to the positioning required for that test she is unable to tolerate that and therefore we  ultimately agreed to proceed with a right breast bracketed lumpectomy.  Her case was discussed at multidisciplinary tumor board and the discussion was that given her prior lymph node interrogation in the setting of a normal clinical and radiological axillary exam and advanced age and comorbidities that a lymph node biopsy would not change any subsequent treatment recommendations and therefore could safely be deferred to decrease operative morbidity.  The risks and possible complications of the procedure were explained to the patient and her family and she understood and agreed to the proposed plan. She was given ample opportunity for questions and those questions were answered to her satisfaction. She has been  encouraged to contact the office with any questions or concerns prior to her next appointment.      This note was created by Dragon voice recognition. Errors in content may be related to improper recognition by the system; efforts to review and correct have been done but errors may still exist. Please be advised the primary purpose of this note is for me to communicate medical care. Standard sentence structure is not always used. Medical terminology and medical abbreviations may be used. There may be grammatical, typographical, and automated fill ins that may have errors missed in proofreading.    Pre-op Diagnosis: Invasive ductal carcinoma of right breast (HCC) [C50.911]    The above referenced H&P was reviewed by Jessica Duffy MD on 5/30/2025, the patient was examined and no significant changes have occurred in the patient's condition since the H&P was performed.  I discussed with the patient and/or legal representative the potential benefits, risks and side effects of this procedure; the likelihood of the patient achieving goals; and potential problems that might occur during recuperation.  I discussed reasonable alternatives to the procedure, including risks, benefits and side effects related to the  alternatives and risks related to not receiving this procedure.  We will proceed with procedure as planned.

## 2025-05-30 NOTE — OPERATIVE REPORT
[facility]    PATIENT'S NAME: REBECA JAIN   ATTENDING PHYSICIAN: Jessica Duffy M.D.   OPERATING PHYSICIAN: Jessica Duffy M.D.   PATIENT ACCOUNT#:   [id number]    LOCATION:  [facility]  MEDICAL RECORD #:   [id number]       YOB: 1945  ADMISSION DATE:       05/30/2025      OPERATION DATE:  05/30/2025    OPERATIVE REPORT      PREOPERATIVE DIAGNOSIS:  Multifocal right breast cancer.  POSTOPERATIVE DIAGNOSIS:  Multifocal right breast cancer.  PROCEDURE:  Right breast wire-bracketed lumpectomy with right breast specimen radiography and right breast advancement flap mastopexy for defect measuring 16 sq cm.    ASSISTANT:  Ivette Faye CSA.    ESTIMATED BLOOD LOSS:  5 mL.    DRAINS:  None.    COMPLICATIONS:  None.    DISPOSITION:  Stable on transfer to recovery room.    INDICATIONS:  The patient has a personal history of a right breast cancer for which she previously underwent a lumpectomy, chemotherapy, and radiation.  Had recent imaging confirming 2 suspicious findings and was confirmed to have a new ipsilateral multifocal breast cancer.  We discussed standard of care in the setting of a prior lumpectomy and radiation would be a completion mastectomy.  Patient was now motivated for this surgery due to age and comorbidities, and therefore, after case discussion at multidisciplinary tumor board, she was deemed to be a suitable candidate for a 2-site wire-bracketed lumpectomy.  Given normal clinical and radiological axillary exam, we would defer any lymph patrizia interrogation as it would not affect any further treatment recommendations, and therefore, risks and possible complications of the lumpectomy were discussed with the patient including, but not limited to, infection, bleeding, injury to surrounding structures, possible need for reoperation.  She agreed to the proposed surgery.    OPERATIVE TECHNIQUE:  Patient was brought to the imaging suite where she  underwent wire bracketing of the areas of concern in the right breast.  She was then brought to the OR, placed in supine position, properly padded and secured, given a dose of IV antibiotics, and sequential compression devices were applied to her legs for DVT prophylaxis.  Monitored anesthesia care was induced, and the right breast was then prepped and draped in usual sterile fashion.  Then, 1% lidocaine with epinephrine was used to infiltrate the target incision site.  A curvilinear incision was made along the lateral areolar border with a 15-blade knife in the skin.  Both wires were identified, brought in the field.  A segment of breast tissue surrounding the tip of both wires was then excised in bracketed fashion, and oriented with a short stitch and single clip superiorly and a long stitch and double clip laterally in order to allow for appropriate pathological margin assessment and review.  This was then placed in the imaging device where specimen x-ray confirmed the presence of both wires and targeted clips with adequate margins as deemed by myself.  Using sharp dissection and electrocautery, 6 margins were then taken from the interior of the lumpectomy cavity, each were marked with a clip at true margin, individually labeled, and sent for routine permanent pathologic evaluation.  Wound was then irrigated with warm sterile saline.  Hemostasis was assured with electrocautery.  Hemoclips were placed around the periphery of the cavity to assist with subsequent surveillance.  The wound was then inspected.  She was found to have a large defect measuring at least 16 sq cm, thought to impair both optimal wound healing and cosmesis, for which we proceeded with an advancement flap mastopexy.  This required that I place a counterincision through the immediate adjacent medial superior breast parenchyma down to the level of the pectoralis fascia.  I then used a superior vascular pedicle, mobilized this into the  aforementioned defect and secured at the level of pectoralis fascia with a running 3-0 PDS suture.  The wound was then closed with an interrupted 3-0 Vicryl for deep layer and a running 4-0 subcuticular Monocryl for skin.  Mastisol and Steri-Strips were applied.  Then, 0.5% Marcaine was instilled in the cavity to assist with postoperative analgesia.  A sterile dressing and compression bra were placed.  Blood loss was minimal.  All counts were correct at the conclusion of the procedure.  She tolerated the procedure well and was transferred to Recovery in stable condition.    Dictated By Jessica Duffy M.D.  d: 05/30/2025 16:26:08  t: 05/30/2025 16:42:23  Job [id number]  [facility] [place]    cc: VIKI Burleson M.D.

## 2025-05-30 NOTE — ANESTHESIA PREPROCEDURE EVALUATION
PRE-OP EVALUATION    Patient Name: Korina Mosquera    Admit Diagnosis: Invasive ductal carcinoma of right breast (HCC) [C50.911]    Pre-op Diagnosis: Invasive ductal carcinoma of right breast (HCC) [C50.911]    Right breast wire bracketed localized lumpectomy    Anesthesia Procedure: Right breast wire bracketed localized lumpectomy (Right: Breast)    Surgeons and Role:     * Jessica Duffy MD - Primary    Pre-op vitals reviewed.  Temp: 97.8 °F (36.6 °C)  Pulse: 77  Resp: 18  BP: 137/74  SpO2: 96 %  Body mass index is 35.99 kg/m².    Current medications reviewed.  Hospital Medications:  Current Medications[1]    Outpatient Medications:   Prescriptions Prior to Admission[2]    Allergies: Codeine and Latex      Anesthesia Evaluation    Patient summary reviewed.    Anesthetic Complications  (-) history of anesthetic complications         GI/Hepatic/Renal         (+) hiatal hernia    (+) chronic renal disease and CRI              (+) irritable bowel syndrome     Cardiovascular  Comment: Stress (2022): Normal.    ECG reviewed.  Exercise tolerance: poor         (+) obesity  (+) hypertension and well controlled  (+) hyperlipidemia  (+) CAD                                Endo/Other                           (+) arthritis       Pulmonary  Comment: Former tobacco abuse             (+) shortness of breath            Neuro/Psych      (+) depression  (+) anxiety                      H/o breast cancer    Past Surgical History[3]  Social Hx on file[4]  History   Drug Use No     Available pre-op labs reviewed.     Lab Results   Component Value Date     05/19/2025    K 4.4 05/19/2025     05/19/2025    CO2 24.0 05/19/2025    BUN 24 (H) 05/19/2025    CREATSERUM 1.23 (H) 05/19/2025    GLU 96 05/19/2025    CA 10.2 05/19/2025            Airway      Mallampati: III  Mouth opening: 3 FB  TM distance: 4 - 6 cm  Neck ROM: full Cardiovascular    Cardiovascular exam normal.  Rhythm: regular  Rate: normal  (-) murmur    Dental    Dentition appears grossly intact         Pulmonary    Pulmonary exam normal.  Breath sounds clear to auscultation bilaterally.               Other findings        ASA: 3   Plan: general and MAC  NPO status verified and patient meets guidelines.  Patient has taken beta blockers in last 24 hours. (Last dose 5/30/25 at 0930.)  Post-procedure pain management plan discussed with surgeon and patient.      Plan/risks discussed with: patient and child/children            Present on Admission:  **None**               [1]  • [Transfer Hold] acetaminophen (Tylenol Extra Strength) tab 1,000 mg  1,000 mg Oral Once   • lactated ringers infusion   Intravenous Continuous   • [Transfer Hold] diazePAM (Valium) tab 5 mg  5 mg Oral Q30 Min PRN   • ceFAZolin (Ancef) 2g in 10mL IV syringe premix  2 g Intravenous Once   [2]  Medications Prior to Admission   Medication Sig Dispense Refill Last Dose/Taking   • AMLODIPINE 5 MG Oral Tab TAKE 1 TABLET(5 MG) BY MOUTH DAILY 90 tablet 0 5/30/2025 at 10:00 AM   • CELECOXIB 200 MG Oral Cap TAKE 1 CAPSULE(200 MG) BY MOUTH DAILY 90 capsule 0 5/23/2025   • rosuvastatin 10 MG Oral Tab Take 1 tablet (10 mg total) by mouth nightly.   5/29/2025   • Benazepril HCl 20 MG Oral Tab Take 1 tablet (20 mg total) by mouth daily. 90 tablet 1 5/29/2025 at 10:00 AM   • DULoxetine 20 MG Oral Cap DR Particles Take 1 capsule (20 mg total) by mouth daily. 90 capsule 1 5/30/2025   • DULoxetine 60 MG Oral Cap DR Particles Take 1 capsule (60 mg total) by mouth daily. 90 capsule 1 5/30/2025   • Metoprolol Succinate ER 25 MG Oral Tablet 24 Hr Take 1 tablet (25 mg total) by mouth in the morning.   5/30/2025 at 10:00 AM   • Cholecalciferol (VITAMIN D) 1000 units Oral Tab Take by mouth. Daily except when she takes weekly 50,000 dose   5/30/2025   • Omega-3 Fatty Acids (FISH OIL OR) Take by mouth in the morning.   5/23/2025   [3]  Past Surgical History:  Procedure Laterality Date   • Back surgery  1970    slipped  disc   • Capsule endoscopy - internal referral  1/24/18= Normal   • Chemotherapy  2014   • Colon ca scrn not hi rsk ind N/A 12/11/2014    Procedure: ESOPHAGOGASTRODUODENOSCOPY, COLONOSCOPY, POSSIBLE BIOPSY, POSSIBLE POLYPECTOMY [id number];  Surgeon: Mikie Stock MD;  Location: [facility]   • Colonoscopy  1/9/18= Hemorrhoids    Repeat PRN   • Colonoscopy N/A 01/09/2018    Procedure: COLONOSCOPY;  Surgeon: Onur Levi MD;  Location:  ENDOSCOPY   • Lumpectomy left  05/2014    IDC -done at [facility]   • Raymond biopsy stereo nodule 1 site right (cpt=19081)  03/2015    benign   • Raymond biopsy stereo nodule 1 site right (cpt=19081)  02/2022    radial scars   • Patient documented not to have experienced any of the following events N/A 12/11/2014    Procedure: ESOPHAGOGASTRODUODENOSCOPY, COLONOSCOPY, POSSIBLE BIOPSY, POSSIBLE POLYPECTOMY [id number];  Surgeon: Mikie Stock MD;  Location: [facility]   • Patient withough preoperative order for iv antibiotic surgical site infection prophylaxis. N/A 12/11/2014    Procedure: ESOPHAGOGASTRODUODENOSCOPY, COLONOSCOPY, POSSIBLE BIOPSY, POSSIBLE POLYPECTOMY [id number];  Surgeon: Mikie Stock MD;  Location: [facility]   • Radiation left  2014   • Spine surgery procedure unlisted     • Upper gi endoscopy,biopsy N/A 12/11/2014    Procedure: ESOPHAGOGASTRODUODENOSCOPY, COLONOSCOPY, POSSIBLE BIOPSY, POSSIBLE POLYPECTOMY [id number];  Surgeon: Mikie Stock MD;  Location: [facility]   • Upper gi endoscopy,biopsy  1/8/18= Hiatal hernia. SB Bx normal   • Upper gi endoscopy,exam     [4]  Social History  Socioeconomic History   • Marital status:    • Number of children: 2   Occupational History   • Occupation: Retired Nurse    Tobacco Use   • Smoking status: Former     Current packs/day: 0.00     Average packs/day: 1 pack/day for 20.0 years (20.0 ttl pk-yrs)     Types: Cigarettes     Start  date: 10/10/1973     Quit date: 10/10/1993     Years since quittin.6   • Smokeless tobacco: Never   • Tobacco comments:     Updated 24   Vaping Use   • Vaping status: Never Used   Substance and Sexual Activity   • Alcohol use: No   • Drug use: No   Other Topics Concern   •  Service No   • Blood Transfusions No   • Caffeine Concern No   • Occupational Exposure No   • Hobby Hazards No   • Sleep Concern No   • Stress Concern No   • Weight Concern Yes   • Special Diet No   • Back Care Yes   • Seat Belt Yes

## 2025-05-30 NOTE — DISCHARGE INSTRUCTIONS
Breast Surgery  Post-operative Instructions  Excisional Biopsy, Lumpectomy, Mastectomy, Vanduser Node Biopsy, or Axillary  Lymph Node Dissection  Jessica Duffy MD  General Instructions  The following instructions will provide helpful information that will assist your recovery. These are designed to be general guidelines. Please remember that everyone heals and recovers differently. Listen to your body and rest when you are tired. If you have any questions or concerns, please do not hesitate to contact my office. I would like to see you in the office about one week after surgery, please schedule and appointment through my office to make a post-operative appointment if you do not already have one.     Restrictions  There are no lifting weight restrictions for the arm on the surgical side. You may gradually increase the amount of weight based on your comfort level. You should avoid a lot of repetitious activity with the arm until the drain is out (if one was placed) and the wound is well-healed (about two weeks).   You should not drive a car until you believe you can react to an emergency situation and you’re no longer taking narcotic pain medications.   You may shower the day after surgery. You should not bathe or swim (i.e. submerge wound) until the wound is well healed (about two weeks).  There are no dietary restrictions.    Exercise  You may begin arm exercises within a couple days. Do these 2 or 3 times per day, beginning with light exercise and gradually increase your range of motion and repetitions. This will help your arm regain full mobility. We will address your activity level again at your post-operative visit.   You will have pain medication prescribed before discharge. Take this as directed to relieve pain. It is important that you be comfortable so that you may continue your stretching exercises.   If you find the medication prescribed is too strong, try Tylenol (Acetaminophen) or  Ibuprofen.    Wound Care  You may remove the gauze dressing on the first or second postoperative day and then shower. You should leave the steri-strips in place; they will start to peel off about 10 days after your surgery. The stitches are all underneath the skin and will dissolve on their own. You will not need any stitches removed except if you have a drain in place.  I encourage you to shower once the outer bandage is removed, you may use soap and water directly over the steri-strips and pat dry following.  You should keep gauze dressing on the wound until the wound is completely dry and without drainage-usually 1-3 days.   If a surgical bra was placed after the surgery, I encourage you to wear it as much as possible during the week following the procedure (including during sleep). Alternatively, you may choose to wear your own bra provided it is comfortable, provides support and does not have an underwire. If the breast doesn’t move it is less painful.  If an elastic bandage was placed around your chest after the surgery you may remove it on the 1st or 2nd day after surgery. If you prefer to leave it on longer, you may.  It is normal to feel a lump in the area of the incisions for up to 6 months. This is part of the healing process. Eventually the breast will return to its normal condition.   Drain Care (if placed at time of axillary dissection or mastectomy)-This will be explained by your nurse prior to discharge.  Empty the drain and strip the tubing 2-3 times daily, more often if the plastic squeeze bottle fills up. This will prevent the tubing from clogging.   Starting at the top of the tubing next to your body firmly grasp the tubing with the index finger and thumb of one hand. With the other hand use the index finger and thumb to move the fluid down the tubing (this is called “stripping the tubing”).   Uncap the pouring spout and squeeze the contents of the plastic bottle into the measuring cup.   Squeeze  the bottle flat to create suction and replace the cap while squeezing to maintain the vacuum.   Measure and record the output and discard the fluid into the toilet. Record the output each time you empty the bottle.   Keep track of the output (drainage) and when the total is down to 30 cc’s or less over a 24-hour period we’ll remove the drain in the office. This is usually after 1-2  weeks, but can be longer in certain patients.   Drain removal takes about 30 seconds and is virtually painless. The suture is cut and the drain slides right out. You should call my office as the output approaches 30 cc’s over 24 hours so that we may schedule an office visit for drain removal.  If you have had reconstruction your plastic surgeon will remove the drain according to their protocol.    Pain Medication  You will be given a prescription for a narcotic pain medication (usually Norco) upon discharge. Many patients have very little pain and don’t want to use the narcotic. Don’t be afraid to use it if you’re uncomfortable. If you’d prefer you may substitute Tylenol or Ibuprofen (Motrin, Advil). Using an ice pack for a few minutes over the incision can also alleviate pain. If you do use the narcotic medication, use an over the counter stool softener or gentle laxative and stay well-hydrated as constipation is not uncommon with narcotics.    Pathology Report  The Pathology report is usually available 4-5 business days following the surgery. I will call you  with the results once the report is available.    Notify my office if:   Your temperature is over 101.5 F   You notice increasing swelling, redness, warmth or drainage from around the incision or drain site.    If you experience any problems please call my office and either my nurse or myself will respond. After hours, you will be forwarded to my answering service which will help you get in touch with myself or the physician covering for me.

## 2025-06-02 ENCOUNTER — TELEPHONE (OUTPATIENT)
Age: 80
End: 2025-06-02

## 2025-06-02 NOTE — TELEPHONE ENCOUNTER
Called patient, post op day #. States that overall doing well, pain is well controlled. She has looked at incisions and no signs of infection. Denies difficulty eating/drinking, denies nausea/vomiting. Able to move upper extremities without difficulty. Is aware of her surgical follow up appointment. Scheduled follow-up appointment with medical oncologist, Dr. Gunn, on Tuesday June 17, 2025 at 1330 in [place]. Instructed patient that her case will be discussed at the upcoming multidisciplinary conference, and if there is any updates the breast RN navigators will contact her. Phone number provided and encouraged patient to call back with any questions or concerns.

## 2025-06-03 ENCOUNTER — PATIENT OUTREACH (OUTPATIENT)
Dept: CASE MANAGEMENT | Age: 80
End: 2025-06-03

## 2025-06-03 DIAGNOSIS — I10 HYPERTENSION, UNSPECIFIED TYPE: ICD-10-CM

## 2025-06-03 DIAGNOSIS — F41.9 ANXIETY: ICD-10-CM

## 2025-06-03 DIAGNOSIS — M17.0 PRIMARY OSTEOARTHRITIS OF BOTH KNEES: Primary | ICD-10-CM

## 2025-06-03 DIAGNOSIS — C50.911 INVASIVE DUCTAL CARCINOMA OF RIGHT BREAST (HCC): ICD-10-CM

## 2025-06-03 DIAGNOSIS — I25.10 CORONARY ARTERY DISEASE INVOLVING NATIVE CORONARY ARTERY OF NATIVE HEART, UNSPECIFIED WHETHER ANGINA PRESENT: ICD-10-CM

## 2025-06-03 DIAGNOSIS — E78.2 HYPERLIPIDEMIA, MIXED: ICD-10-CM

## 2025-06-03 NOTE — PROGRESS NOTES
Spoke to Korina for Chronic Care Management.      Updates to patient care team/comments: Patient established with Dr. Duffy on 5/13-will add to care team.     Patient reported changes in medications: None    Med Adherence  Comment: Patient reports taking all medications as directed.     Patient updates/concerns:     Patient had screening Mammogram on 3/31 which required additional imaging. Patient then had diagnostic Mammogram on 4/25 which revealed 2 masses in the right breast. Biopsy positive for invasive mammary carcinoma of the right breast. Consult with surgical oncology-Dr. Duffy on 5/13. Patient then proceeded with right breast wire bracketed lumpectomy on 5/30.     Patient is status post op-right breast wire bracketed lumpectomy done by Dr. Duffy at [facility]. Patient reports that she is feeling well overall. Is reporting very little pain/discomfort, is wearing compression bra. Patient notes did take off compression bra to shower and had some mild discomfort. Patient denies any signs of infection at the incision sites, steri strips still in place. Patient states able to eat and drink fine after the procedure. Patient reporting adequate support from family, daughters/granddaughters call daily. Patient will plan to have post op visit with Dr. Duffy on 6/6.     Patient with coronary artery disease, had CT gated coronary artery with calcium scoring done on 2/21. Patient reports that she is current with Cardiology follow up, last seen Dr. Schulte on 4/9. Patient verbalized that she has upcoming follow up appointment to review lab results, however is unsure if labs were drawn. [facility] will call [facility] to clarify.    Goals/Action Plan:    Active goal from previous outreach: Patient to continue to work on diet and increase physical activity as tolerated to achieve weight loss.         Patient reported progress towards goals: Patient reports that she continues to work on her diet to achieve weight loss. Activity  can be difficult due to increased knee pain, however patient states that she is up and active around her home. Will get up and take walks around her home.               - What: Patient will continue to go for walks as tolerated            - Where/When/How: Minimum 2-3x per week at grocery store, shopping center, in her neighborhood.    Patient Reported Barriers and Concerns: N/A                   - Plan for overcoming barriers: CCM encouraged patient to call as needed with any questions or concerns.    Care Managers Interventions:     Call to [facility], spoke with Eleanor. Per Eleanor, patient did not get labs done yet for her appointment on 6/9. Dr. Schulte ordered ProBNP, lipid panel, CBC, TSH, CMP,and A1C. Eleanor states that she will fax the orders to central scheduling. Called patient to inform and she plans to reschedule her appointment with Cardiology until a later date.   CCM thoroughly reviewed patient's chart including any outside records in Care Everywhere.   CCM listened to patient's concern's, provided emotional support and encouraged the patient to reach out as needed further assistance.   CCM to continue to provide encouragement, support and education for healthy coping and management of dx.        Future Appointments:   Future Appointments   Date Time Provider Department Center   6/6/2025  8:30 AM Jessica Duffy MD [facility]   6/17/2025  1:30 PM Julian Gunn MD NP [facility]   7/15/2025  1:30 PM Hanna Avery MD [facility] [id number]         Next Care Manager Follow Up Date: 1 Month     Reason For Follow Up: review progress and or barriers towards patient's goals.     Time Spent This Encounter Total: 39 min medical record review, telephone communication, care plan updates where needed, education, goals, and action plan recreation/update. Provided acknowledgment and validation to patient's concerns.   Monthly Minute Total including today: 39  Minutes  Physical assessment, complete health history, and need for CCM established by Hanna Avery MD.

## 2025-06-06 ENCOUNTER — OFFICE VISIT (OUTPATIENT)
Dept: SURGERY | Facility: CLINIC | Age: 80
End: 2025-06-06
Payer: MEDICARE

## 2025-06-06 VITALS
SYSTOLIC BLOOD PRESSURE: 114 MMHG | RESPIRATION RATE: 18 BRPM | DIASTOLIC BLOOD PRESSURE: 71 MMHG | HEART RATE: 96 BPM | OXYGEN SATURATION: 95 %

## 2025-06-06 DIAGNOSIS — C50.911 INVASIVE DUCTAL CARCINOMA OF RIGHT BREAST (HCC): Primary | ICD-10-CM

## 2025-06-06 PROCEDURE — 99024 POSTOP FOLLOW-UP VISIT: CPT | Performed by: SURGERY

## 2025-06-06 NOTE — PROGRESS NOTES
Breast Surgery New Patient Consultation    This is the first visit for this 79 year old woman, referred by  ***, who presents for evaluation of ***.    History of Present Illness:   Ms. Korina Mosquera is a 79 year old woman who presents with a {left/right:939850} {BREAST CANCER FOUND BY:3185} breast mass/calcifications. She was referred for diagnostic imaging which is detailed below. She {DENIES ANY / HAS:3186} {BREAST CANCER SYMPTOMS:3187}. She {DOES / DOES NOT:3189} have breast pain. She {DOES / DOES NOT:3189} have significant past history for breast diseases or breast biopsy. She {DOES / DOES NOT:3189} have family history of breast cancer. She is here today for evaluation and recommendations for further therapy.        Past Medical History[1]    Past Surgical History[2]    Gynecological History:  Pt is a G***P***  Pt was *** years old at time of first pregnancy.    She {DENIES ANY / HAS:3186} cumulative breastfeeding history of *** months.  She achieved menarche at age *** and LMP ***  Age of Menopause: ***  Type: ***  She {DENIES ANY / HAS:3186} history of hormone replacement therapy for *** years, last in *** .  She {DENIES ANY / HAS:3186} history of oral contraceptive use for *** years, last in ***.  She {DENIES / HAS RECIEVED:3188} infertility treatment to achieve pregnancy.    Medications:    Current Medications[3]    Allergies:    Allergies[4]    Family History:   Family History[5]    She {IS:3281} of Ashkenazi Orthodoxy ancestry.    Social History:  History   Alcohol Use No       History   Smoking Status    Former    Types: Cigarettes   Smokeless Tobacco    Never       Review of Systems:  General:   The patient denies, fever, chills, night sweats, fatigue, generalized weakness, change in appetite or weight loss.    HEENT:     The patient denies eye irritation, cataracts, redness, glaucoma, yellowing of the eyes, change in vision, color blindness, or wearing contacts/glasses. The patient denies  hearing loss, ringing in the ears, ear drainage, earaches, nasal congestion, nose bleeds, snoring, pain in mouth/throat, hoarseness, change in voice, facial trauma.    Respiratory:  The patient denies chronic cough, phlegm, hemoptysis, pleurisy/chest pain, pneumonia, asthma, wheezing, difficulty in breathing with exertion, emphysema, chronic bronchitis, shortness of breath or abnormal sound when breathing.     Cardiovascular:  There is no history of chest pain, chest pressure/discomfort, palpitations, irregular heartbeat, fainting or near-fainting, difficulty breathing when lying flat, SOB/Coughing at night, swelling of the legs or chest pain while walking.    Breasts:  See history of present illness    Gastrointestinal:     There is no history of difficulty or pain with swallowing, reflux symptoms, vomiting, dark or bloody stools, constipation, yellowing of the skin, indigestion, nausea, change in bowel habits, diarrhea, abdominal pain or vomiting blood.     Genitourinary:  The patient denies frequent urination, needing to get up at night to urinate, urinary hesitancy or retaining urine, painful urination, urinary incontinence, decreased urine stream, blood in the urine or vaginal/penile discharge.    Skin:    The patient denies rash, itching, skin lesions, dry skin, change in skin color or change in moles.     Hematologic/Lymphatic:  The patient denies easily bruising or bleeding or persistent swollen glands or lymph nodes.     Musculoskeletal:  The patient denies muscle aches/pain, joint pain, stiff joints, neck pain, back pain or bone pain.    Neuropsychiatric:  There is no history of migraines or severe headaches, seizure/epilepsy, speech problems, coordination problems, trembling/tremors, fainting/black outs, dizziness, memory problems, loss of sensation/numbness, problems walking, weakness, tingling or burning in hands/feet. There is no history of abusive relationship, bipolar disorder, sleep disturbance,  anxiety, depression or feeling of despair.    Endocrine:    There is no history of poor/slow wound healing, weight loss/gain, fertility or hormone problems, cold intolerance, thyroid disease.     Allergic/Immunologic:  There is no history of hives, hay fever, angioedema or anaphylaxis.    /71 (BP Location: Right arm, Patient Position: Sitting, Cuff Size: adult)   Pulse 96   Resp 18   SpO2 95%     Physical Exam:  The patient is an alert, oriented, well-nourished and  well-developed woman who appears her stated age. Her speech patterns and movements are normal. Her affect is appropriate.    HEENT: The head is normocephalic. The neck is supple. The thyroid is not enlarged and is without palpable masses/nodules. There are no palpable masses. The trachea is in the midline. Conjunctiva are clear, non-icteric.    Chest: The chest expands symmetrically. The lungs are clear to auscultation.    Heart: The rhythm is regular.  There are no murmurs, rubs, gallops or thrills.    Breasts:  Her breasts are symmetrical with a cup size ***.  Right breast: The skin, nipple ,and areola appear normal. There is no skin dimpling with movement of the pectoralis. There is no nipple retraction. No nipple discharge can be elicited. The parenchyma is mildly nodular. There are no dominant masses in the breast. The axillary tail is normal.  Left breast:   The skin, nipple, and areola appear normal. There is no skin dimpling with movement of the pectoralis. There is no nipple retraction. No nipple discharge can be elicited. The parenchyma is mildly nodular. There are no dominant masses in the breast. The axillary tail is normal.    Abdomen:  The abdomen is soft, flat and non tender. The liver is not enlarged. There are no palpable masses.    Lymph Nodes:  The supraclavicular, axillary and cervical regions are free of significant lymphadenopathy.    Back: There is no vertebral column tenderness.    Skin: The skin appears normal. There are  no suspicious appearing rashes or lesions.    Extremities: The extremities are without deformity, cyanosis or edema.    Impression:   Ms. Korina Mosquera is a 79 year old woman presents with newly diagnosed {left/right:929458}  Cancer Staging   Breast cancer (HCC)  Staging form: Breast, AJCC V7  - Clinical stage from 6/12/2014: Stage IIA (T2, N0, M0) - Signed by Gomez Casanova MD on 10/1/2016      Discussion and Plan:  I had a discussion with the {LEARNER:4006} regarding her breast exam. On exam today I found *** lesion at the {NUMBERS:31040} o'clock position in the {left/right:463756} breast ***-cm from the nipple. I personally reviewed her recent *** and we discussed this at length.    I have recommended ***. The risks and possible complications of the procedure were explained to the patient and her family and she understood and agreed to the proposed plan. She was given ample opportunity for questions and those questions were answered to her satisfaction. She has been  encouraged to contact the office with any questions or concerns prior to her next appointment.     This encounter lasted a total of *** minutes, more than 50% of which was dedicated to the discussion of management options.     CC: ***    This note was created by [employer] voice recognition. Errors in content may be related to improper recognition by the system; efforts to review and correct have been done but errors may still exist. Please be advised the primary purpose of this note is for me to communicate medical care. Standard sentence structure is not always used. Medical terminology and medical abbreviations may be used. There may be grammatical, typographical, and automated fill ins that may have errors missed in proofreading.             [1]   Past Medical History:   Anxiety    Blood disorder    Anemia    Breast CA (HCC)    Breast cancer (HCC)    Her-2 positive    Breast cancer (HCC)    Her-2 positive    Breast cancer (HCC)    Her-2  positive    Cancer (HCC)    Cataract    Depression    Exposure to medical diagnostic radiation    last tx 2015    Hearing impairment    hard of hearing, no hearing aids    High blood pressure    High cholesterol    Hyperlipidemia    IBS (irritable bowel syndrome)    Osteoarthritis    Personal history of antineoplastic chemotherapy    last tx 2015    Shortness of breath    Unspecified essential hypertension    Visual impairment    Readers   [2]   Past Surgical History:  Procedure Laterality Date    Back surgery  1970    slipped disc    Capsule endoscopy - internal referral  1/24/18= Normal    Chemotherapy  2014    Colon ca scrn not hi rsk ind N/A 12/11/2014    Procedure: ESOPHAGOGASTRODUODENOSCOPY, COLONOSCOPY, POSSIBLE BIOPSY, POSSIBLE POLYPECTOMY 56609,21960;  Surgeon: Mikie Stock MD;  Location: [facility]    Colonoscopy  1/9/18= Hemorrhoids    Repeat PRN    Colonoscopy N/A 01/09/2018    Procedure: COLONOSCOPY;  Surgeon: Onur Levi MD;  Location:  ENDOSCOPY    Lumpectomy left  05/2014    IDC -done at [facility]    Raymond biopsy stereo nodule 1 site right (cpt=19081)  03/2015    benign    Raymond biopsy stereo nodule 1 site right (cpt=19081)  02/2022    radial scars    Patient documented not to have experienced any of the following events N/A 12/11/2014    Procedure: ESOPHAGOGASTRODUODENOSCOPY, COLONOSCOPY, POSSIBLE BIOPSY, POSSIBLE POLYPECTOMY 68839,36869;  Surgeon: Mikie Stock MD;  Location: [facility]    Patient withough preoperative order for iv antibiotic surgical site infection prophylaxis. N/A 12/11/2014    Procedure: ESOPHAGOGASTRODUODENOSCOPY, COLONOSCOPY, POSSIBLE BIOPSY, POSSIBLE POLYPECTOMY 46737,89914;  Surgeon: Mikie Stock MD;  Location: [facility]    Radiation left  2014    Spine surgery procedure unlisted      Upper gi endoscopy,biopsy N/A 12/11/2014    Procedure: ESOPHAGOGASTRODUODENOSCOPY, COLONOSCOPY, POSSIBLE BIOPSY,  POSSIBLE POLYPECTOMY 86601,15337;  Surgeon: Mikie Stock MD;  Location: [facility] [place]    Upper gi endoscopy,biopsy  1/8/18= Hiatal hernia. SB Bx normal    Upper gi endoscopy,exam     [3]    HYDROcodone-acetaminophen 5-325 MG Oral Tab Take 1-2 tablets by mouth every 6 (six) hours as needed for Pain. 20 tablet 0    AMLODIPINE 5 MG Oral Tab TAKE 1 TABLET(5 MG) BY MOUTH DAILY 90 tablet 0    CELECOXIB 200 MG Oral Cap TAKE 1 CAPSULE(200 MG) BY MOUTH DAILY 90 capsule 0    rosuvastatin 10 MG Oral Tab Take 1 tablet (10 mg total) by mouth nightly.      Benazepril HCl 20 MG Oral Tab Take 1 tablet (20 mg total) by mouth daily. 90 tablet 1    DULoxetine 20 MG Oral Cap DR Particles Take 1 capsule (20 mg total) by mouth daily. 90 capsule 1    DULoxetine 60 MG Oral Cap DR Particles Take 1 capsule (60 mg total) by mouth daily. 90 capsule 1    Metoprolol Succinate ER 25 MG Oral Tablet 24 Hr Take 1 tablet (25 mg total) by mouth in the morning.      Cholecalciferol (VITAMIN D) 1000 units Oral Tab Take by mouth. Daily except when she takes weekly 50,000 dose      Omega-3 Fatty Acids (FISH OIL OR) Take by mouth in the morning.     [4]   Allergies  Allergen Reactions    Codeine NAUSEA ONLY    Latex RASH   [5]   Family History  Problem Relation Age of Onset    Heart Attack Father     Other (pneumonia) Mother     Other (unknown cause 32) Brother     Breast Cancer Sister 71    Other (COPD) Sister     Breast Cancer Paternal Aunt         80's    Breast Cancer Paternal Cousin Female         early 30's    Breast Cancer Self 68

## 2025-06-06 NOTE — PROGRESS NOTES
Breast Surgery Post-Operative Visit    Diagnosis: Right breast cancer status post right bracketed lumpectomy-pathology pending    Stage:  Cancer Staging   Breast cancer (HCC)  Staging form: Breast, AJCC V7  - Clinical stage from 2014: Stage IIA (T2, N0, M0) - Signed by Gomez Casanova MD on 10/1/2016    Disease Status:  Surgical pathology pending.    History:   This 79 year old woman presented with imaging detected right breast cancer.  The patient denies any palpable masses, nipple discharge, skin changes or axillary symptoms.  She does have a personal history of a HER2 positive breast cancer at the age of 71 years old for which she underwent lumpectomy, chemotherapy and radiation therapy.  She did not have prior genetic testing.  Her bilateral diagnostic surveillance in 2024 was unremarkable.  She presented for a screening on 2025 and was noted to have a new asymmetry in the right breast.  She had a diagnostic evaluation of the right side on 2025 and was confirmed to have 2 masses at 10:00 6 cm from the nipple and 5 cm from the nipple measuring 1.7 cm and 5 mm respectively and which were 1.1 cm apart from each other for which she underwent a 2 site ultrasound-guided biopsy on 2025.  She was noted to have malignancy at both locations that was ER/PA positive, HER2/emily negative with a Ki-67 of 15 to 25%.  Given her age and comorbidities she elected for a repeat breast conservation approach and tolerated the procedure without complication.  Her pathology is pending.  She is here today for evaluation and recommendations for further therapy.        Past Medical History[1]    Past Surgical History[2]    Gynecological History:  Pt is a   Pt was 26 years old at time of first pregnancy.    She denies any cumulative breastfeeding history   She achieved menarche at age 12 and LMP age 54  Age of Menopause: 54  Type: natural menopause  She denies any history of hormone replacement  therapy   She has history of oral contraceptive use for 2 years.  She denies infertility treatment to achieve pregnancy.    Medications:    Current Medications[3]    Allergies:    Allergies[4]    Family History:   Family History[5]    She is not of Ashkenazi Buddhism ancestry.    Social History:  History   Alcohol Use No       History   Smoking Status    Former    Types: Cigarettes   Smokeless Tobacco    Never     Ms. Korina Mosquera is  with 6 children. She has 3 siblings. She is currently Retired    Review of Systems:  General:   The patient denies, fever, chills, night sweats, fatigue, generalized weakness, change in appetite or weight loss.    HEENT:     The patient denies eye irritation, +cataracts, redness, glaucoma, yellowing of the eyes, change in vision, color blindness, or wearing contacts/glasses. The patient denies hearing loss, ringing in the ears, ear drainage, earaches, nasal congestion, nose bleeds, snoring, pain in mouth/throat, hoarseness, change in voice, facial trauma.    Respiratory:  The patient denies chronic cough, phlegm, hemoptysis, pleurisy/chest pain, pneumonia, asthma, wheezing, difficulty in breathing with exertion, emphysema, chronic bronchitis, shortness of breath or abnormal sound when breathing.     Cardiovascular:  There is no history of chest pain, chest pressure/discomfort, palpitations, irregular heartbeat, fainting or near-fainting, difficulty breathing when lying flat, SOB/Coughing at night, swelling of the legs or chest pain while walking.    Breasts:  See history of present illness    Gastrointestinal:     There is no history of difficulty or pain with swallowing, reflux symptoms, vomiting, dark or bloody stools, constipation, yellowing of the skin, indigestion, nausea, change in bowel habits, diarrhea, abdominal pain or vomiting blood.     Genitourinary:  The patient denies frequent urination, needing to get up at night to urinate, urinary hesitancy or  retaining urine, painful urination, urinary incontinence, decreased urine stream, blood in the urine or vaginal/penile discharge.    Skin:    The patient denies rash, itching, skin lesions, dry skin, change in skin color or change in moles.     Hematologic/Lymphatic:  The patient denies easily bruising or bleeding or persistent swollen glands or lymph nodes.     Musculoskeletal:  The patient denies muscle aches/pain, joint pain, +stiff joints, neck pain, back pain or bone pain.    Neuropsychiatric:  There is no history of migraines or severe headaches, seizure/epilepsy, speech problems, coordination problems, trembling/tremors, fainting/black outs, dizziness, memory problems, loss of sensation/numbness, problems walking, weakness, tingling or burning in hands/feet. There is no history of abusive relationship, bipolar disorder, sleep disturbance, anxiety, +depression or feeling of despair.    Endocrine:    There is no history of poor/slow wound healing, weight loss/gain, fertility or hormone problems, cold intolerance, thyroid disease.     Allergic/Immunologic:  There is no history of hives, hay fever, angioedema or anaphylaxis.    /71 (BP Location: Right arm, Patient Position: Sitting, Cuff Size: adult)   Pulse 96   Resp 18   SpO2 95%     Physical Exam:  The patient is an alert, oriented, well-nourished and  well-developed woman who appears her stated age. Her speech patterns and movements are normal. Her affect is appropriate.    HEENT: The head is normocephalic. The neck is supple. The thyroid is not enlarged and is without palpable masses/nodules. There are no palpable masses. The trachea is in the midline. Conjunctiva are clear, non-icteric.    Chest: The chest expands symmetrically. The lungs are clear to auscultation.    Heart: The rhythm is regular.  There are no murmurs, rubs, gallops or thrills.    Breasts:  Her breasts are symmetrical with a cup size 42B.  Right breast: The skin, nipple ,and  areola appear normal. There is no skin dimpling with movement of the pectoralis. There is no nipple retraction. No nipple discharge can be elicited. The parenchyma is mildly nodular. There are no dominant masses in the breast. The axillary tail is normal.  There is a well-healed incision with no signs of infection.  Left breast:   The skin, nipple, and areola appear normal. There is no skin dimpling with movement of the pectoralis. There is no nipple retraction. No nipple discharge can be elicited. The parenchyma is mildly nodular. There are no dominant masses in the breast. The axillary tail is normal.    Abdomen:  The abdomen is soft, flat and non tender. The liver is not enlarged. There are no palpable masses.    Lymph Nodes:  The supraclavicular, axillary and cervical regions are free of significant lymphadenopathy.    Back: There is no vertebral column tenderness.    Skin: The skin appears normal. There are no suspicious appearing rashes or lesions.    Extremities: The extremities are without deformity, cyanosis or edema.    Impression:   Ms. Korina Mosquera is a 79 year old woman presents with history of a HER2 positive right breast cancer with new diagnosis of ipsilateral right breast multifocal breast cancer status post bracketed lumpectomy, pathology pending.    Discussion and Plan:  I had a discussion with the Patient regarding her breast exam. On exam today I found her to be healing well since the surgery with no signs of infection.  Her pathology is currently pending and I will contact her with results once they are available to discuss the ongoing management plan.  Given her prior personal history in the setting of her advanced age and comorbidities we discussed that any further treatment will be carefully discussed for our multidisciplinary team and then ongoing surveillance including any imaging sequence would be dictated based on the pathology results. Her case was discussed at  multidisciplinary tumor board and the discussion was that given her prior lymph node interrogation in the setting of a normal clinical and radiological axillary exam and advanced age and comorbidities that a lymph node biopsy would not change any subsequent treatment recommendations and therefore could safely be deferred to decrease operative morbidity.  She was encouraged to contact the office with any questions or concerns prior to her next scheduled appointment.        [1]   Past Medical History:   Anxiety    Blood disorder    Anemia    Breast CA (HCC)    Breast cancer (HCC)    Her-2 positive    Breast cancer (HCC)    Her-2 positive    Breast cancer (HCC)    Her-2 positive    Cancer (HCC)    Cataract    Depression    Exposure to medical diagnostic radiation    last tx 2015    Hearing impairment    hard of hearing, no hearing aids    High blood pressure    High cholesterol    Hyperlipidemia    IBS (irritable bowel syndrome)    Osteoarthritis    Personal history of antineoplastic chemotherapy    last tx 2015    Shortness of breath    Unspecified essential hypertension    Visual impairment    Readers   [2]   Past Surgical History:  Procedure Laterality Date    Back surgery  1970    slipped disc    Capsule endoscopy - internal referral  1/24/18= Normal    Chemotherapy  2014    Colon ca scrn not hi rsk ind N/A 12/11/2014    Procedure: ESOPHAGOGASTRODUODENOSCOPY, COLONOSCOPY, POSSIBLE BIOPSY, POSSIBLE POLYPECTOMY 93267,71290;  Surgeon: Mikie Stock MD;  Location: [facility] [place] [facility]    Colonoscopy  1/9/18= Hemorrhoids    Repeat PRN    Colonoscopy N/A 01/09/2018    Procedure: COLONOSCOPY;  Surgeon: Onur Levi MD;  Location:  ENDOSCOPY    Lumpectomy left  05/2014    IDC -done at [facility]    Raymond biopsy stereo nodule 1 site right (cpt=19081)  03/2015    benign    Raymond biopsy stereo nodule 1 site right (cpt=19081)  02/2022    radial scars    Patient documented not to have experienced any of  the following events N/A 12/11/2014    Procedure: ESOPHAGOGASTRODUODENOSCOPY, COLONOSCOPY, POSSIBLE BIOPSY, POSSIBLE POLYPECTOMY 00014,33851;  Surgeon: Mikie Stock MD;  Location: [facility]    Patient withough preoperative order for iv antibiotic surgical site infection prophylaxis. N/A 12/11/2014    Procedure: ESOPHAGOGASTRODUODENOSCOPY, COLONOSCOPY, POSSIBLE BIOPSY, POSSIBLE POLYPECTOMY 63297,10931;  Surgeon: Mikie Stock MD;  Location: [facility]    Radiation left  2014    Spine surgery procedure unlisted      Upper gi endoscopy,biopsy N/A 12/11/2014    Procedure: ESOPHAGOGASTRODUODENOSCOPY, COLONOSCOPY, POSSIBLE BIOPSY, POSSIBLE POLYPECTOMY 99654,37343;  Surgeon: Mikie Stock MD;  Location: [facility]    Upper gi endoscopy,biopsy  1/8/18= Hiatal hernia. SB Bx normal    Upper gi endoscopy,exam     [3]    HYDROcodone-acetaminophen 5-325 MG Oral Tab Take 1-2 tablets by mouth every 6 (six) hours as needed for Pain. 20 tablet 0    AMLODIPINE 5 MG Oral Tab TAKE 1 TABLET(5 MG) BY MOUTH DAILY 90 tablet 0    CELECOXIB 200 MG Oral Cap TAKE 1 CAPSULE(200 MG) BY MOUTH DAILY 90 capsule 0    rosuvastatin 10 MG Oral Tab Take 1 tablet (10 mg total) by mouth nightly.      Benazepril HCl 20 MG Oral Tab Take 1 tablet (20 mg total) by mouth daily. 90 tablet 1    DULoxetine 20 MG Oral Cap DR Particles Take 1 capsule (20 mg total) by mouth daily. 90 capsule 1    DULoxetine 60 MG Oral Cap DR Particles Take 1 capsule (60 mg total) by mouth daily. 90 capsule 1    Metoprolol Succinate ER 25 MG Oral Tablet 24 Hr Take 1 tablet (25 mg total) by mouth in the morning.      Cholecalciferol (VITAMIN D) 1000 units Oral Tab Take by mouth. Daily except when she takes weekly 50,000 dose      Omega-3 Fatty Acids (FISH OIL OR) Take by mouth in the morning.     [4]   Allergies  Allergen Reactions    Codeine NAUSEA ONLY    Latex RASH   [5]   Family History  Problem Relation Age of Onset     Heart Attack Father     Other (pneumonia) Mother     Other (unknown cause 32) Brother     Breast Cancer Sister 71    Other (COPD) Sister     Breast Cancer Paternal Aunt         80's    Breast Cancer Paternal Cousin Female         early 30's    Breast Cancer Self 68

## 2025-06-10 ENCOUNTER — TELEPHONE (OUTPATIENT)
Age: 80
End: 2025-06-10

## 2025-06-10 DIAGNOSIS — Z17.0 MALIGNANT NEOPLASM OF OVERLAPPING SITES OF RIGHT BREAST IN FEMALE, ESTROGEN RECEPTOR POSITIVE (HCC): Primary | ICD-10-CM

## 2025-06-10 DIAGNOSIS — C50.811 MALIGNANT NEOPLASM OF OVERLAPPING SITES OF RIGHT BREAST IN FEMALE, ESTROGEN RECEPTOR POSITIVE (HCC): Primary | ICD-10-CM

## 2025-06-10 NOTE — TELEPHONE ENCOUNTER
Called patient to update her about the discussion today about her surgical pathology at the breast multidisciplinary conference. Stated that an Oncotype was ordered and takes about 2 weeks with results and that Dr. Gunn will want to discuss the results with her and the recommendations with endocrine therapy and future recurrence. Stated that we will need to reschedule her appointment with Dr. Gunn and rescheduled her on Wednesday June 25, 2025 at 1400 in [place]. Stated that a radiation consultation appointment was also recommended from the team and she asked why she would need to do that. We discussed it briefly and she agreed to talk about this further with Dr. Gunn at her upcoming appointment.

## 2025-06-11 ENCOUNTER — TELEPHONE (OUTPATIENT)
Facility: CLINIC | Age: 80
End: 2025-06-11

## 2025-06-11 NOTE — TELEPHONE ENCOUNTER
Patient LVM regarding pathology results. Let patient know that  will review and reach out to her.Patient verbally understood and appreciated the call back .

## 2025-06-12 ENCOUNTER — TELEPHONE (OUTPATIENT)
Age: 80
End: 2025-06-12

## 2025-06-12 DIAGNOSIS — F32.A DEPRESSION, UNSPECIFIED DEPRESSION TYPE: ICD-10-CM

## 2025-06-12 RX ORDER — DULOXETIN HYDROCHLORIDE 60 MG/1
60 CAPSULE, DELAYED RELEASE ORAL DAILY
Qty: 60 CAPSULE | Refills: 0 | Status: SHIPPED | OUTPATIENT
Start: 2025-06-12

## 2025-06-12 NOTE — TELEPHONE ENCOUNTER
Called [employer] to cancel Oncotype, as the system flagged it for not a primary breast cancer and unable to process test.

## 2025-06-12 NOTE — TELEPHONE ENCOUNTER
Last Office Visit: 12/13/25  Last Refill: 12/13/24  Return to Clinic: 6 months   Protocol: passed   NOV: 7/15/25    Requested Prescriptions     Pending Prescriptions Disp Refills    DULoxetine 60 MG Oral Cap DR Particles 30 capsule 0     Sig: Take 1 capsule (60 mg total) by mouth daily.       Please approve if appropriate.     Thank you!

## 2025-06-17 ENCOUNTER — APPOINTMENT (OUTPATIENT)
Age: 80
End: 2025-06-17
Attending: FAMILY MEDICINE
Payer: MEDICARE

## 2025-06-24 ENCOUNTER — NURSE NAVIGATOR ENCOUNTER (OUTPATIENT)
Age: 80
End: 2025-06-24

## 2025-06-24 NOTE — PROGRESS NOTES
Cancer Center Progress Note      Patient Name:  Korina Mosquera  YOB: 1945  Medical Record Number:  [id number]    Date of visit: 6/25/2025    CHIEF COMPLAINT: [place] breast ca    HPI:     79 year old female previously followed by Dr. Casanova.  L lumpectomy/SLN bx-6/14-3.2 cm grade 3 IDC, ER+, HER2 neg.  LN neg. Oncotype-29.  TC x 4  RT completed 12/14  Started anastrozole 10/14 but significant joint pain.  Switched to tamoxifen in 12/14.  Tamoxifen discontinued 5/24 for vaginal bleeding.  EB-polyp.    [place] mammogram 3/25-right breast abnormality.  Right breast biopsy 4/25-invasive mammary carcinoma with lobular features, grade 2, 100% ER, 90% KS, HER2 1+, Ki-67 25%  Genetic testing negative.  R breast lumpectomy 5/25.  Path-2 foci grade 2 invasive carcinoma with ductal and lobular features measuring 37 mm and 1 mm.  Margins negative.  LN bx deferred.  100% ER, 90% KS, HER2 1+, Ki-67 25%.    History of recurrent anemia due to iron deficiency.  Has received IV iron.  Colonoscopy and endoscopy done 2018 when she was admitted and showed hiatal hernia and hemorrhoids.  Presents for evaluation.      SOCIAL HISTORY:    ,  passed 2000 from colon ca. 2 daughters. 5 grandkids 11-18 years.     ROS:     Constitutional: no fever, chills fatigue,night sweats or weight loss  Neurologic: no headache, seizures, diplopia or weakness  Respiratory: no cough, SOB or hemoptysis  Cardiovascular: no chest pain, ankle swelling, FERRELL  GI: no nausea, vomiting, diarrhea or BRBPR  Musculoskeletal: no body-ache or joint pain  Dermatologic: no alopecia, rash, pruritis  : no hematuria, dysuria or frequency  Psych: no confusion or depression   Heme: no easy bruising or bleeding     ALLERGIES:    Allergies   Allergen Reactions    Codeine NAUSEA ONLY    Latex RASH       MEDICATIONS:    Current Outpatient Medications   Medication Sig Dispense Refill    exemestane 25 MG Oral Tab Take 1 tablet (25 mg total) by mouth  daily. 90 tablet 3    DULoxetine 60 MG Oral Cap DR Particles Take 1 capsule (60 mg total) by mouth daily. 60 capsule 0    HYDROcodone-acetaminophen 5-325 MG Oral Tab Take 1-2 tablets by mouth every 6 (six) hours as needed for Pain. 20 tablet 0    AMLODIPINE 5 MG Oral Tab TAKE 1 TABLET(5 MG) BY MOUTH DAILY 90 tablet 0    CELECOXIB 200 MG Oral Cap TAKE 1 CAPSULE(200 MG) BY MOUTH DAILY 90 capsule 0    rosuvastatin 10 MG Oral Tab Take 1 tablet (10 mg total) by mouth nightly.      Benazepril HCl 20 MG Oral Tab Take 1 tablet (20 mg total) by mouth daily. 90 tablet 1    DULoxetine 20 MG Oral Cap DR Particles Take 1 capsule (20 mg total) by mouth daily. 90 capsule 1    Metoprolol Succinate ER 25 MG Oral Tablet 24 Hr Take 1 tablet (25 mg total) by mouth in the morning.      Cholecalciferol (VITAMIN D) 1000 units Oral Tab Take by mouth. Daily except when she takes weekly 50,000 dose      Omega-3 Fatty Acids (FISH OIL OR) Take by mouth in the morning.         VITALS:  Height: --  Weight: --  BSA (Calculated - sq m): --  Pulse: 98 ( 1406)  BP: 136/73 ( 1406)  Temp: 98 °F (36.7 °C) ( 1406)  Do Not Use - Resp Rate: --  SpO2: 96 % ( 1406)    PS:  ECO    PHYSICAL EXAM:    General: alert and oriented x 3, not in acute distress.  Ambulates with walker.  Accompanied by daughter.    CNS: no focal deficit      Emotional well being: Patient's emotional well being was assessed.  No issues requiring acute psychosocial intervention were identified.     LABS:     No results found for this or any previous visit (from the past 24 hours).    ASSESSMENT AND PLAN:     # R breast carcinoma: Diag  on abnormal mammogram.  S/p R lumpectomy -2 foci grade 2 invasive carcinoma with ductal and lobular features measuring 3.7 cm and 1 mm.  Margins negative.  LN bx deferred.  100% ER, 90% OR, HER2 1+, Ki-67 25%.    Oncotype sent on specimen as this appears to be new primary.  Will contact patient when results are back.  If  score is > 25, will have to weigh pros and cons of adjuvant chemotherapy given her age and PS.  Due to an abundance of caution, will check CT C/A/P to rule out metastatic disease.    Candidate for RT. Rationale discussed, she is agreeable.    Candidate for endocrine therapy with AI.  Rx for exemestane sent to her pharmacy.  She may start this after radiation is completed and see me 3 months after that.    # Osteopenia: DEXA 11/20 showed osteopenia with T-score -2.2.  Will repeat at next visit.    # L breast ca: stage II, s/p lumpectomy 6/14-3.2 cm grade 2 IDC, LN negative.  Tumor ER positive, HER2 negative.  Oncotype 29.  Received 4 cycles of TC and completed RT 12/14.  AI started 10/14 but unable to tolerate, switched to tamoxifen.  Discontinued 8/23 years due to vaginal bleeding.      # Vaginal bleeding: Underwent D&C 10/23.  No malignancy.        # Quality measures:None indicated    Survivorship issues were addressed with the patient.  Patient-reported issues included arthralgias.  Recommended interventions as follows:  Supportive care..       ORDERS PLACED:        Procedures    Radiation Onc Referral - [facility] ([facility])    CT CHEST+ABDOMEN+PELVIS(ALL CNTRST ONLY)([id number]=[id number])       Savanah Gunn M.D.    [facility]  [address] [place] [address] [place], [place], [address]      6/25/2025

## 2025-06-25 ENCOUNTER — OFFICE VISIT (OUTPATIENT)
Age: 80
End: 2025-06-25
Attending: FAMILY MEDICINE
Payer: MEDICARE

## 2025-06-25 ENCOUNTER — TELEPHONE (OUTPATIENT)
Age: 80
End: 2025-06-25

## 2025-06-25 VITALS
OXYGEN SATURATION: 96 % | DIASTOLIC BLOOD PRESSURE: 73 MMHG | TEMPERATURE: 98 F | HEART RATE: 98 BPM | SYSTOLIC BLOOD PRESSURE: 136 MMHG | RESPIRATION RATE: 18 BRPM

## 2025-06-25 DIAGNOSIS — Z17.0 MALIGNANT NEOPLASM OF OVERLAPPING SITES OF LEFT BREAST IN FEMALE, ESTROGEN RECEPTOR POSITIVE (HCC): ICD-10-CM

## 2025-06-25 DIAGNOSIS — C50.911 RECURRENT MALIGNANT NEOPLASM OF RIGHT BREAST (HCC): ICD-10-CM

## 2025-06-25 DIAGNOSIS — C50.911 MALIGNANT NEOPLASM OF RIGHT BREAST IN FEMALE, ESTROGEN RECEPTOR POSITIVE, UNSPECIFIED SITE OF BREAST (HCC): Primary | ICD-10-CM

## 2025-06-25 DIAGNOSIS — C50.812 MALIGNANT NEOPLASM OF OVERLAPPING SITES OF LEFT BREAST IN FEMALE, ESTROGEN RECEPTOR POSITIVE (HCC): ICD-10-CM

## 2025-06-25 DIAGNOSIS — Z17.0 MALIGNANT NEOPLASM OF RIGHT BREAST IN FEMALE, ESTROGEN RECEPTOR POSITIVE, UNSPECIFIED SITE OF BREAST (HCC): Primary | ICD-10-CM

## 2025-06-25 RX ORDER — EXEMESTANE 25 MG/1
25 TABLET ORAL DAILY
Qty: 90 TABLET | Refills: 3 | Status: SHIPPED | OUTPATIENT
Start: 2025-06-25

## 2025-06-25 NOTE — PROGRESS NOTES
Education Record    Learner:  Patient family member     Disease / Diagnosis:rossy breast cancer       Barriers / Limitations:  None   Comments:    Method:  Discussion   Comments:    General Topics:  Plan of care reviewed   Comments:    Outcome:  Shows understanding   Comments:   S/p recent lumpectomy right breast   Feeling well, no pain.

## 2025-06-25 NOTE — TELEPHONE ENCOUNTER
Called patient to update her that I scheduled CT that Dr. Gunn ordered and instructed patient that the radiation department will contact her to schedule a consultation appointment with Dr. Gupta. Stated to the patient that she can  the oral contrast at the [facility] or she can arrive 90 minutes prior to the CT start time. She stated she will  the contrast at the [facility]. Provided the details of the test for July 9, 2025 at 1630 at the [facility]. She thanked me for the phone call and assistance and I provided her the contact information for the breast navigators for further assistance, if needed.

## 2025-07-01 ENCOUNTER — TELEPHONE (OUTPATIENT)
Age: 80
End: 2025-07-01

## 2025-07-01 NOTE — TELEPHONE ENCOUNTER
Called patient, Oncotype: Right breast CEA is 9 which corresponds to a 3% risk of distant recurrence with endocrine therapy.  No benefit of chemotherapy.  Recommend proceeding with RT.  Then should start exemestane which has been sent already.  Follow-up with me 3 months after that.  Order DEXA next visit.

## 2025-07-07 ENCOUNTER — TELEPHONE (OUTPATIENT)
Age: 80
End: 2025-07-07

## 2025-07-07 NOTE — TELEPHONE ENCOUNTER
Called patient to remind her the barium contrast for her CT scan is at the desk in the cancer center on the second floor. She states she will  on later today. Reviewed that she will need to drink this prior to her CT that is scheduled on Wednesday.

## 2025-07-09 ENCOUNTER — HOSPITAL ENCOUNTER (OUTPATIENT)
Dept: CT IMAGING | Facility: HOSPITAL | Age: 80
Discharge: HOME | End: 2025-07-09
Attending: INTERNAL MEDICINE
Payer: MEDICARE

## 2025-07-09 ENCOUNTER — PATIENT OUTREACH (OUTPATIENT)
Dept: CASE MANAGEMENT | Age: 80
End: 2025-07-09

## 2025-07-09 DIAGNOSIS — Z17.0 MALIGNANT NEOPLASM OF RIGHT BREAST IN FEMALE, ESTROGEN RECEPTOR POSITIVE, UNSPECIFIED SITE OF BREAST (HCC): ICD-10-CM

## 2025-07-09 DIAGNOSIS — F41.9 ANXIETY: ICD-10-CM

## 2025-07-09 DIAGNOSIS — C50.911 MALIGNANT NEOPLASM OF RIGHT BREAST IN FEMALE, ESTROGEN RECEPTOR POSITIVE, UNSPECIFIED SITE OF BREAST (HCC): ICD-10-CM

## 2025-07-09 DIAGNOSIS — I25.10 CORONARY ARTERY DISEASE INVOLVING NATIVE CORONARY ARTERY OF NATIVE HEART, UNSPECIFIED WHETHER ANGINA PRESENT: ICD-10-CM

## 2025-07-09 DIAGNOSIS — I10 HYPERTENSION, UNSPECIFIED TYPE: ICD-10-CM

## 2025-07-09 DIAGNOSIS — E66.01 OBESITY, MORBID (HCC): Primary | ICD-10-CM

## 2025-07-09 DIAGNOSIS — E78.2 HYPERLIPIDEMIA, MIXED: ICD-10-CM

## 2025-07-09 DIAGNOSIS — C50.911 RECURRENT MALIGNANT NEOPLASM OF RIGHT BREAST (HCC): ICD-10-CM

## 2025-07-09 DIAGNOSIS — M17.0 PRIMARY OSTEOARTHRITIS OF BOTH KNEES: ICD-10-CM

## 2025-07-09 DIAGNOSIS — C50.911 INVASIVE DUCTAL CARCINOMA OF RIGHT BREAST (HCC): ICD-10-CM

## 2025-07-09 LAB
CREAT BLD-MCNC: 1.2 MG/DL (ref 0.55–1.02)
EGFRCR SERPLBLD CKD-EPI 2021: 45 ML/MIN/1.73M2 (ref 60–?)

## 2025-07-09 PROCEDURE — 71260 CT THORAX DX C+: CPT | Performed by: INTERNAL MEDICINE

## 2025-07-09 PROCEDURE — 74177 CT ABD & PELVIS W/CONTRAST: CPT | Performed by: INTERNAL MEDICINE

## 2025-07-09 PROCEDURE — 82565 ASSAY OF CREATININE: CPT

## 2025-07-09 NOTE — PROGRESS NOTES
Spoke to Korina for Chronic Care Management.      Updates to patient care team/comments: None    Patient reported changes in medications: Patient reporting that she will begin exemestane 25mg after radiation is completed-per Oncology.    Med Adherence  Comment: Patient reports taking all medications as directed.       Health Maintenance:     Health Maintenance   Topic Date Due    Zoster Vaccines (2 of 2) 11/21/2024    COVID-19 Vaccine (9 - 2024-25 season) 03/26/2025    Influenza Vaccine (1) 10/01/2025    Annual Physical  12/13/2025    DEXA Scan  Completed    Annual Depression Screening  Completed    Fall Risk Screening (Annual)  Completed    Pneumococcal Vaccine: 50+ Years  Completed    Meningococcal B Vaccine  Aged Out       Patient updates/concerns:     Patient reports that she is feeling well.     Patient is status post right breast wire bracketed lumpectomy done on 5/30, she reports tolerating well. Patient reports that her incisions are healed. Patient denies any pain, tightness, or swelling in the right breast or arm. Patient reports that she has full range of motion in right arm. Patient has followed up with her surgeon, she will plan to get CT scan of abdomen and pelvis later today. Patient has appointment with Dr. Duran-Radiation/Oncology and will plan to begin radiation after consult.     CCM offered to assist with care coordination-patient declines.     Patient verbalized that her knee pain is awful. Patient states that she has been taking celebrex for about 19 years, however does not feel like it has helped. Patient is ambulating with walker.          Goals/Action Plan:    Active goal from previous outreach: Patient to continue to work on diet and increase physical activity as tolerated to achieve weight loss.         Patient reported progress towards goals: Patient continues to work toward goal.                - What: Patient will continue to go for walks as tolerated            - Where/When/How:  Minimum 2-3x per week at grocery store, shopping center, in her neighborhood.    Patient Reported Barriers and Concerns: N/A                   - Plan for overcoming barriers: CCM encouraged patient to call as needed with any questions or concerns.    Care Managers Interventions:     CCM thoroughly reviewed patient's chart including any outside records in Care Everywhere.   CCM listened to patient's concern's, provided emotional support and encouraged the patient to reach out as needed further assistance.   CCM to continue to provide encouragement, support and education for healthy coping and management of dx.        Future Appointments:   Future Appointments   Date Time Provider Department Center   7/9/2025  4:30 PM  CT MAIN RM4  CT [facility]   7/15/2025  1:30 PM Hanna Avery MD [facility] [id number]   7/23/2025  1:00 PM Keerthi Gupta MD  RAD ONC [facility]         Next Care Manager Follow Up Date: 1 Month     Reason For Follow Up: review progress and or barriers towards patient's goals.     Time Spent This Encounter Total: 22 min medical record review, telephone communication, care plan updates where needed, education, goals, and action plan recreation/update. Provided acknowledgment and validation to patient's concerns.   Monthly Minute Total including today: 22 Minutes  Physical assessment, complete health history, and need for CCM established by Hanna Avery MD.

## 2025-07-15 ENCOUNTER — OFFICE VISIT (OUTPATIENT)
Dept: FAMILY MEDICINE CLINIC | Facility: CLINIC | Age: 80
End: 2025-07-15
Payer: MEDICARE

## 2025-07-15 VITALS
TEMPERATURE: 97 F | RESPIRATION RATE: 15 BRPM | DIASTOLIC BLOOD PRESSURE: 82 MMHG | OXYGEN SATURATION: 97 % | BODY MASS INDEX: 36 KG/M2 | HEART RATE: 105 BPM | HEIGHT: 66 IN | SYSTOLIC BLOOD PRESSURE: 126 MMHG

## 2025-07-15 DIAGNOSIS — M25.561 CHRONIC PAIN OF RIGHT KNEE: ICD-10-CM

## 2025-07-15 DIAGNOSIS — E78.2 HYPERLIPIDEMIA, MIXED: ICD-10-CM

## 2025-07-15 DIAGNOSIS — N18.31 STAGE 3A CHRONIC KIDNEY DISEASE (HCC): ICD-10-CM

## 2025-07-15 DIAGNOSIS — G89.29 CHRONIC PAIN OF LEFT KNEE: ICD-10-CM

## 2025-07-15 DIAGNOSIS — F32.A DEPRESSION, UNSPECIFIED DEPRESSION TYPE: ICD-10-CM

## 2025-07-15 DIAGNOSIS — I71.43 INFRARENAL ABDOMINAL AORTIC ANEURYSM (AAA) WITHOUT RUPTURE: ICD-10-CM

## 2025-07-15 DIAGNOSIS — C50.911 INVASIVE DUCTAL CARCINOMA OF RIGHT BREAST (HCC): ICD-10-CM

## 2025-07-15 DIAGNOSIS — I25.10 CORONARY ARTERY DISEASE INVOLVING NATIVE CORONARY ARTERY OF NATIVE HEART, UNSPECIFIED WHETHER ANGINA PRESENT: ICD-10-CM

## 2025-07-15 DIAGNOSIS — I10 ESSENTIAL HYPERTENSION: Primary | ICD-10-CM

## 2025-07-15 DIAGNOSIS — R73.9 HYPERGLYCEMIA: ICD-10-CM

## 2025-07-15 DIAGNOSIS — M25.562 CHRONIC PAIN OF LEFT KNEE: ICD-10-CM

## 2025-07-15 DIAGNOSIS — D50.9 IRON DEFICIENCY ANEMIA, UNSPECIFIED IRON DEFICIENCY ANEMIA TYPE: ICD-10-CM

## 2025-07-15 DIAGNOSIS — E27.9 ADRENAL NODULE (HCC): ICD-10-CM

## 2025-07-15 DIAGNOSIS — E55.9 VITAMIN D DEFICIENCY: ICD-10-CM

## 2025-07-15 DIAGNOSIS — E66.01 SEVERE OBESITY (BMI 35.0-35.9 WITH COMORBIDITY) (HCC): ICD-10-CM

## 2025-07-15 DIAGNOSIS — G89.29 CHRONIC PAIN OF RIGHT KNEE: ICD-10-CM

## 2025-07-15 PROCEDURE — 99214 OFFICE O/P EST MOD 30 MIN: CPT | Performed by: FAMILY MEDICINE

## 2025-07-15 PROCEDURE — 99499 UNLISTED E&M SERVICE: CPT | Performed by: FAMILY MEDICINE

## 2025-07-15 PROCEDURE — G2211 COMPLEX E/M VISIT ADD ON: HCPCS | Performed by: FAMILY MEDICINE

## 2025-07-15 RX ORDER — AMLODIPINE BESYLATE 5 MG/1
5 TABLET ORAL DAILY
Qty: 90 TABLET | Refills: 1 | Status: SHIPPED | OUTPATIENT
Start: 2025-07-15

## 2025-07-15 RX ORDER — BENAZEPRIL HYDROCHLORIDE 20 MG/1
20 TABLET ORAL DAILY
Qty: 90 TABLET | Refills: 1 | Status: SHIPPED | OUTPATIENT
Start: 2025-07-15

## 2025-07-15 RX ORDER — CELECOXIB 200 MG/1
200 CAPSULE ORAL DAILY
Qty: 90 CAPSULE | Refills: 1 | Status: SHIPPED | OUTPATIENT
Start: 2025-07-15

## 2025-07-15 RX ORDER — DULOXETIN HYDROCHLORIDE 20 MG/1
20 CAPSULE, DELAYED RELEASE ORAL DAILY
Qty: 90 CAPSULE | Refills: 1 | Status: SHIPPED | OUTPATIENT
Start: 2025-07-15

## 2025-07-15 RX ORDER — DULOXETIN HYDROCHLORIDE 60 MG/1
60 CAPSULE, DELAYED RELEASE ORAL DAILY
Qty: 90 CAPSULE | Refills: 1 | Status: SHIPPED | OUTPATIENT
Start: 2025-07-15

## 2025-07-15 NOTE — PATIENT INSTRUCTIONS
Continue current meds.   Watch diet for fats and carbs.   Stay active.    Follow-up with specialists as recommended by them.  Do fasting blood work prior Medicare wellness visit in December 2025.      Refill policies:      Allow 3 business days for refills; controlled substances may take longer.  Contact your pharmacy at least 5-7 business days prior to running out of medication and have them send an electronic request or submit through the \"request refill\" option thru your [employer] account. No need to do both, as multiple requests will create an automated [employer] message to notify of a denial for one of the duplicated requests, causing you undue confusion.   Refills are NOT addressed on weekends; covering physicians do not authorize routine medications on weekends.  No narcotics or controlled substances are refilled after noon on Fridays or by on call physicians.  By law, narcotics cannot be faxed or phoned into your pharmacy.  If your prescription is due for a refill, you may be due for a follow up appointment. Please call our office at [phone] to make an appointment or schedule an appointment via [employer].  To best provide you care, patients receiving routine medications need to be seen at least twice a year. Patients receiving narcotic/controlled substance medications need to be seen at least once every 3 months.  In the event that your preferred pharmacy does not have the requested medication in stock (ie Backordered), it is your responsibility to find another pharmacy that has the requested medication available. We will gladly send a new prescription to that pharmacy at your request.  controlled substances may not be able to be filled out of state due to license restrictions.  If you have a planned trip, it's best to call your pharmacy at least 5-7 business days to prevent any delays in your medication refill.    Scheduling Tests:    If your physician has ordered radiology tests such as MRI or CT scans,  please contact Central Scheduling at [phone] right away to schedule the test.  Once scheduled, the [facility] Centralized Referral Team will work with your insurance carrier to obtain pre-certification or prior authorization.  Depending on your insurance carrier, approval may take 3-10 days.  It is highly recommended patients assure they have received an authorization before having a test performed.  If test is done without insurance authorization, patient may be responsible for the entire amount billed.      Precertification and Prior Authorizations:  If your physician has recommended that you have a procedure or additional testing performed the [facility] Centralized Referral Team will contact your insurance carrier to obtain pre-certification or prior authorization.    You are strongly encouraged to contact your insurance carrier to verify that your procedure/test has been approved and is a COVERED benefit.  Although the [facility] Centralized Referral Team does its due diligence, the insurance carrier gives the disclaimer that \"Although the procedure is authorized, this does not guarantee payment.\"    Ultimately the patient is responsible for payment.   Thank you for your understanding in this matter.  Paperwork Completion:  If you require FMLA or disability paperwork for your recovery, please make sure to either drop it off or have it faxed to our office at [phone]. Be sure the form has your name and date of birth on it.  The form will be faxed to our Forms Department and they will complete it for you.  There is a 25$ fee for all forms that need to be filled out.  Please be aware there is a 10-14 day turnaround time.  You will need to sign a release of information (RAMAN) form if your paperwork does not come with one.  You may call the Forms Department with any questions at [phone].  Their fax number is [phone].

## 2025-07-15 NOTE — PROGRESS NOTES
Korina Mosquera is a 79 year old female.cc hypertension, anemia, depression/anxiety, breast cancer, hypercholesterolemia, obese shortness of breath,   HPI:   HTN - doing well with medications.No side effects.  BP is controlled at home. Takes meds regularly. Needs refills. No chest pain, No SOB, no palpitations.     Hyperlipidemia- patient is on medication is doing well continue blood work she will return for testing.      Patient was diagnosed with anxiety and depression. She feels that she is doing well on Cymbalta 80 mg daily.  No suicidal no homicidal.  Patient needs refill of the medication.    Patient diagnosed recently with right breast cancer status post lumpectomy.  She is recovering well.  She has an appointment coming up in July with radiation oncologist.  She will have radiation as adjuvant therapy.  Then she will start the oral medication.  No chemotherapy is planned.    Patient had history of left breast cancer status post radiation and chemo was seeing Dr. Casanova.  Doing well.  Right now sees Dr. Saab.      Patient  had a CT scan of the abdomen, pelvis and chest which showed 2.8 cm aortic aneurysm of the abdominal aorta infrarenal will be monitored recheck ultrasound in 1 year.    Patient has iron deficiency anemia.    Morbid obesity needs to lose weight.  Low-carb diet.    Patient complains of having shortness of breath on exertion.  She had a evaluation done in 2017.  Heart evaluation came back unremarkable after the stress test which showed some abnormality.  Pulmonary function test did not show any obstruction.  Patient has distant history of smoking.  She had huge hiatal hernia on the CT scan was likely contributing to her shortness of breath.    Arthritis bilateral knees she will continue Celebrex. Orthopedic evaluation  was done getting some injections.  Had injection in January of this year it did not work.  She has an appointment plan to schedule for April.     Adrenal nodule  -monitor.  Was stable on the CT scan.    Current Outpatient Medications   Medication Sig Dispense Refill    amLODIPine 5 MG Oral Tab Take 1 tablet (5 mg total) by mouth daily. 90 tablet 1    DULoxetine 60 MG Oral Cap DR Particles Take 1 capsule (60 mg total) by mouth daily. 90 capsule 1    celecoxib 200 MG Oral Cap Take 1 capsule (200 mg total) by mouth daily. 90 capsule 1    Benazepril HCl 20 MG Oral Tab Take 1 tablet (20 mg total) by mouth daily. 90 tablet 1    DULoxetine 20 MG Oral Cap DR Particles Take 1 capsule (20 mg total) by mouth daily. 90 capsule 1    exemestane 25 MG Oral Tab Take 1 tablet (25 mg total) by mouth daily. 90 tablet 3    HYDROcodone-acetaminophen 5-325 MG Oral Tab Take 1-2 tablets by mouth every 6 (six) hours as needed for Pain. 20 tablet 0    rosuvastatin 10 MG Oral Tab Take 1 tablet (10 mg total) by mouth nightly.      Metoprolol Succinate ER 25 MG Oral Tablet 24 Hr Take 1 tablet (25 mg total) by mouth in the morning.      Cholecalciferol (VITAMIN D) 1000 units Oral Tab Take by mouth. Daily except when she takes weekly 50,000 dose      Omega-3 Fatty Acids (FISH OIL OR) Take by mouth in the morning.        Past Medical History:    Anxiety    Blood disorder    Anemia    Breast CA (HCC)    Breast cancer (HCC)    Her-2 positive    Breast cancer (HCC)    Her-2 positive    Breast cancer (HCC)    Her-2 positive    Cancer (HCC)    Cataract    Depression    Exposure to medical diagnostic radiation    last tx 2015    Hearing impairment    hard of hearing, no hearing aids    High blood pressure    High cholesterol    Hyperlipidemia    IBS (irritable bowel syndrome)    Osteoarthritis    Personal history of antineoplastic chemotherapy    last tx 2015    Shortness of breath    Unspecified essential hypertension    Visual impairment    Readers      Social History:  Social History     Socioeconomic History    Marital status:     Number of children: 2   Occupational History    Occupation: Retired  Nurse    Tobacco Use    Smoking status: Former     Current packs/day: 0.00     Average packs/day: 1 pack/day for 20.0 years (20.0 ttl pk-yrs)     Types: Cigarettes     Start date: 10/10/1973     Quit date: 10/10/1993     Years since quittin.7    Smokeless tobacco: Never    Tobacco comments:     Updated 24   Vaping Use    Vaping status: Never Used   Substance and Sexual Activity    Alcohol use: No    Drug use: No   Other Topics Concern     Service No    Blood Transfusions No    Caffeine Concern No    Occupational Exposure No    Hobby Hazards No    Sleep Concern No    Stress Concern No    Weight Concern Yes    Special Diet No    Back Care Yes    Seat Belt Yes   Social History Narrative    Lives alone in [place] in a townhouse. Two daughters live close by.     Social Drivers of Health     Food Insecurity: No Food Insecurity (3/5/2024)    Food Insecurity     Food Insecurity: Never true   Transportation Needs: No Transportation Needs (3/5/2024)    Transportation Needs     Lack of Transportation: No   Stress: No Stress Concern Present (3/5/2024)    Stress     Feeling of Stress : No   Housing Stability: Low Risk  (3/5/2024)    Housing Stability     Housing Instability: No        REVIEW OF SYSTEMS:   GENERAL HEALTH: feels well otherwise  SKIN: denies any unusual skin lesions or rashes  HEENT no congestion no runny nose no sore throat  Neck no neck pain  RESPIRATORY: Having some shortness of breath with exertion  CARDIOVASCULAR: denies chest pain on exertion  GI: denies abdominal pain and denies heartburn  NEURO: denies headaches  Musculoskeletal bilateral knee pain  Psych stable mood,     EXAM:   /82   Pulse 105   Temp 97.2 °F (36.2 °C) (Temporal)   Resp 15   Ht 5' 6\" (1.676 m)   SpO2 97%   BMI 35.99 kg/m²   GENERAL: well developed, well nourished,in no apparent distress, obese  SKIN: no rashes,no suspicious lesions  HEENT: atraumatic, normocephalic,  NECK: supple,no adenopathy  LUNGS: clear  to auscultation  CARDIO: RRR without murmur  GI: good BS's,no masses, HSM or tenderness  EXTREMITIES: no cyanosis, clubbing or edema  Musculoskeletal bilateral knee pain  Psychiatric - alert  and oriented x3, normal mood     Results for orders placed or performed during the hospital encounter of 07/09/25   POCT CREATININE    Collection Time: 07/09/25  5:10 PM   Result Value Ref Range    ISTAT Creatinine 1.20 (H) 0.55 - 1.02 mg/dL    eGFR-Cr 45 (L) >=60 mL/min/1.73m2     ASSESSMENT AND PLAN:     Encounter Diagnoses   Name Primary?    Essential hypertension Yes    Invasive ductal carcinoma of right breast (HCC)     Depression, unspecified depression type     Coronary artery disease involving native coronary artery of native heart, unspecified whether angina present     Hyperlipidemia, mixed     Hyperglycemia     Iron deficiency anemia, unspecified iron deficiency anemia type     Adrenal nodule (HCC)     Vitamin D deficiency     Stage 3a chronic kidney disease (HCC)     Chronic pain of right knee     Chronic pain of left knee     Infrarenal abdominal aortic aneurysm (AAA) without rupture     Severe obesity (BMI 35.0-35.9 with comorbidity) (HCC)        Orders Placed This Encounter   Procedures    Lipid Panel    Comp Metabolic Panel (14)    CBC With Differential With Platelet    TSH W Reflex To Free T4    Iron And Tibc [E]    Ferritin [E]    Vitamin D [E]       Meds & Refills for this Visit:  Requested Prescriptions     Signed Prescriptions Disp Refills    amLODIPine 5 MG Oral Tab 90 tablet 1     Sig: Take 1 tablet (5 mg total) by mouth daily.    DULoxetine 60 MG Oral Cap DR Particles 90 capsule 1     Sig: Take 1 capsule (60 mg total) by mouth daily.    celecoxib 200 MG Oral Cap 90 capsule 1     Sig: Take 1 capsule (200 mg total) by mouth daily.    Benazepril HCl 20 MG Oral Tab 90 tablet 1     Sig: Take 1 tablet (20 mg total) by mouth daily.    DULoxetine 20 MG Oral Cap DR Particles 90 capsule 1     Sig: Take 1 capsule  (20 mg total) by mouth daily.   Continue current meds.   Watch diet for fats and carbs.   Stay active.    Follow-up with specialists as recommended by them.  Do fasting blood work prior Medicare wellness visit in December 2025.    Imaging & Consults:  None    The patient indicates understanding of these issues and agrees to the plan.  The patient is asked to return in 6 mo for Medicare wellness visit.  The note was dictated using speech recognition software.  Accuracy and grammar in transcription may be subject to error.

## 2025-07-16 ENCOUNTER — APPOINTMENT (OUTPATIENT)
Dept: RADIATION ONCOLOGY | Facility: HOSPITAL | Age: 80
End: 2025-07-16
Attending: INTERNAL MEDICINE

## 2025-07-22 ENCOUNTER — PATIENT OUTREACH (OUTPATIENT)
Dept: CASE MANAGEMENT | Age: 80
End: 2025-07-22

## 2025-07-22 NOTE — PROGRESS NOTES
Patient graduated from [facility], see letter sent via my chart.

## 2025-07-23 ENCOUNTER — HOSPITAL ENCOUNTER (OUTPATIENT)
Dept: RADIATION ONCOLOGY | Facility: HOSPITAL | Age: 80
Discharge: HOME | End: 2025-07-23
Attending: RADIOLOGY
Payer: MEDICARE

## 2025-07-23 VITALS
SYSTOLIC BLOOD PRESSURE: 147 MMHG | TEMPERATURE: 98 F | HEART RATE: 104 BPM | RESPIRATION RATE: 20 BRPM | DIASTOLIC BLOOD PRESSURE: 98 MMHG | OXYGEN SATURATION: 95 %

## 2025-07-23 PROCEDURE — 99214 OFFICE O/P EST MOD 30 MIN: CPT

## 2025-07-23 NOTE — PROGRESS NOTES
Nursing Consultation Note  Patient: Korina Mosquera  YOB: 1945  Age: 79 year old  Radiation Oncologist: Dr. Keerthi Gupta  Referring Physician: Dr. Tati Ceja  Diagnosis: RIGHT BREAST IDC  Consult Date: 7/23/2025      Chemotherapy: s/p 4 cycles of TC for L breast ca in 2014    Labs: Reviewed  Imaging: Reviewed  Is the patient of child-bearing age?         No  Menarche: 13 y/o  Menopause: mid-50's  OCP use x1 year  No HRT use  Completed Tamoxifen 5/2024  Breastfeed: NO  BRA SIZE: 44B    Has the patient received radiation therapy in the past? yes, completed RT to L breast in 2014    Does the patient have an implantable device?No   Patient has/has had:     1. Assistive Devices: Walker    2. Flu Vaccination: yes    3. Pneumonia Vaccination:  yes    Vital Signs:   Vitals:    07/23/25 1312   BP: (!) 147/98   Pulse: 104   Resp: 20   Temp: 98.1 °F (36.7 °C)   ,   Wt Readings from Last 6 Encounters:   05/30/25 101.2 kg (223 lb)   05/19/25 100.7 kg (222 lb)   02/19/25 107 kg (236 lb)   01/15/25 107 kg (236 lb)   08/14/24 107 kg (236 lb)   01/04/24 107.5 kg (236 lb 15.9 oz)       Nursing Note: History of left breast cancer in 2014, underwent lumpectomy then adjuvant chemotherapy. Completed L breast RT on 12/4/14. Was on Tamoxifen until 5/2024. Had screening mammo in March 2025, showed asymmetry to R breast. Diagnostic work-up done, had biopsy on 4/30/25. Path showed R breast IDC gr 2 with DCIS gr 1, ER/TX+ and Her2 not amplified. Met with Dr. Duffy, underwent lumpectomy on 5/30/25, margins clear. Oncotype sent, RS = 9. No chemo recommended, discussed endocrine therapy and referred for RT.Pt here alone, AOX4. Using walker for ambulatory assistance. Denies breast pain or edema. Has full ROM to RUE.       Review of Systems   Constitutional: Negative.    HENT: Negative.     Eyes: Negative.    Respiratory:  Positive for shortness of breath.         SOB with exertion   Cardiovascular:  Negative.    Gastrointestinal: Negative.    Endocrine: Negative.    Genitourinary: Negative.    Musculoskeletal: Negative.    Allergic/Immunologic: Negative.    Neurological: Negative.    Hematological: Negative.    Psychiatric/Behavioral: Negative.            Allergies:  Allergies[1]    Current Medications[2]    Preferred Pharmacy:    [id number] - [place], [place] - [address] AT [facility] [address], [phone], [phone]  [address]  [facility] [address]  Phone: [phone] Fax: [phone]      Past Medical History[3]    Past Surgical History[4]    Social History     Socioeconomic History    Marital status:      Spouse name: Not on file    Number of children: 2    Years of education: Not on file    Highest education level: Not on file   Occupational History    Occupation: Retired Nurse    Tobacco Use    Smoking status: Former     Current packs/day: 0.00     Average packs/day: 1 pack/day for 20.0 years (20.0 ttl pk-yrs)     Types: Cigarettes     Start date: 10/10/1973     Quit date: 10/10/1993     Years since quittin.8    Smokeless tobacco: Never    Tobacco comments:     Updated 24   Vaping Use    Vaping status: Never Used   Substance and Sexual Activity    Alcohol use: No    Drug use: No    Sexual activity: Not Currently   Other Topics Concern     Service No    Blood Transfusions No    Caffeine Concern No    Occupational Exposure No    Hobby Hazards No    Sleep Concern No    Stress Concern No    Weight Concern Yes    Special Diet No    Back Care Yes    Exercise Not Asked    Bike Helmet Not Asked    Seat Belt Yes    Self-Exams Not Asked   Social History Narrative    Lives alone in [place] in a townhouse. Two daughters live close by.     Social Drivers of Health     Food Insecurity: No Food Insecurity (3/5/2024)    Food Insecurity     Food Insecurity: Never true   Transportation Needs: No Transportation Needs (3/5/2024)    Transportation Needs     Lack  of Transportation: No   Housing Stability: Low Risk  (3/5/2024)    Housing Stability     Housing Instability: No     Housing Instability Emergency: Not on file     Crib or Bassinette: Not on file       ECOG:  Grade 1 - No physically strenuous activity, but ambulatory and able to carry out light and sedentary work (e.g. office work, light house work).    Education: YES  Knowledge Deficit Plan Of Care:    Problem:  Knowledge Deficit    Problems related to:    Radiation therapy    Interventions:  Instruct on purpose of radiation therapy    Expected Outcomes:  Knowledge of radiation therapy    Progress Toward Outcome:  Making progress    Pamphlets/Handouts Given to Patient:  Understanding radiation therapy      Are ADL's met?  Yes  Does patient feel safe in their environment?  Yes  Care decisions:  Patient and/or surrogate IS involved in care decisions.  Advanced directives:  Patient DOES NOT have advanced directives.  Transportation:  Adequate transportation available for expected visits    Pain:   ;Pain Score: 0   ;    ;          [1]   Allergies  Allergen Reactions    Codeine NAUSEA ONLY    Latex RASH   [2]   Current Outpatient Medications   Medication Sig Dispense Refill    amLODIPine 5 MG Oral Tab Take 1 tablet (5 mg total) by mouth daily. 90 tablet 1    DULoxetine 60 MG Oral Cap DR Particles Take 1 capsule (60 mg total) by mouth daily. 90 capsule 1    celecoxib 200 MG Oral Cap Take 1 capsule (200 mg total) by mouth daily. 90 capsule 1    Benazepril HCl 20 MG Oral Tab Take 1 tablet (20 mg total) by mouth daily. 90 tablet 1    DULoxetine 20 MG Oral Cap DR Particles Take 1 capsule (20 mg total) by mouth daily. 90 capsule 1    rosuvastatin 10 MG Oral Tab Take 1 tablet (10 mg total) by mouth nightly.      Metoprolol Succinate ER 25 MG Oral Tablet 24 Hr Take 1 tablet (25 mg total) by mouth in the morning.      Cholecalciferol (VITAMIN D) 1000 units Oral Tab Take by mouth. Daily except when she takes weekly 50,000 dose       Omega-3 Fatty Acids (FISH OIL OR) Take by mouth in the morning.      exemestane 25 MG Oral Tab Take 1 tablet (25 mg total) by mouth daily. (Patient not taking: Reported on 7/23/2025) 90 tablet 3    HYDROcodone-acetaminophen 5-325 MG Oral Tab Take 1-2 tablets by mouth every 6 (six) hours as needed for Pain. (Patient not taking: Reported on 7/23/2025) 20 tablet 0   [3]   Past Medical History:   Anxiety    Blood disorder    Anemia    Breast CA (HCC)    Breast cancer (HCC)    Her-2 positive    Breast cancer (HCC)    Her-2 positive    Breast cancer (HCC)    Her-2 positive    Cancer (HCC)    Cataract    Depression    Exposure to medical diagnostic radiation    last tx 2015    Hearing impairment    hard of hearing, no hearing aids    High blood pressure    High cholesterol    Hyperlipidemia    IBS (irritable bowel syndrome)    Osteoarthritis    Personal history of antineoplastic chemotherapy    last tx 2015    Shortness of breath    Unspecified essential hypertension    Visual impairment    Readers   [4]   Past Surgical History:  Procedure Laterality Date    Back surgery  1970    slipped disc    Capsule endoscopy - internal referral  1/24/18= Normal    Chemotherapy  2014    Colon ca scrn not hi rsk ind N/A 12/11/2014    Procedure: ESOPHAGOGASTRODUODENOSCOPY, COLONOSCOPY, POSSIBLE BIOPSY, POSSIBLE POLYPECTOMY 69435,24431;  Surgeon: Mikie Stock MD;  Location: [facility]    Colonoscopy  1/9/18= Hemorrhoids    Repeat PRN    Colonoscopy N/A 01/09/2018    Procedure: COLONOSCOPY;  Surgeon: Onur Levi MD;  Location:  ENDOSCOPY    Lumpectomy left  05/2014    IDC -done at [facility]    Raymond biopsy stereo nodule 1 site right (cpt=19081)  03/2015    benign    Raymond biopsy stereo nodule 1 site right (cpt=19081)  02/2022    radial scars    Patient documented not to have experienced any of the following events N/A 12/11/2014    Procedure: ESOPHAGOGASTRODUODENOSCOPY, COLONOSCOPY, POSSIBLE BIOPSY,  POSSIBLE POLYPECTOMY 04950,85274;  Surgeon: Mikie Stock MD;  Location: [facility]    Patient withough preoperative order for iv antibiotic surgical site infection prophylaxis. N/A 12/11/2014    Procedure: ESOPHAGOGASTRODUODENOSCOPY, COLONOSCOPY, POSSIBLE BIOPSY, POSSIBLE POLYPECTOMY 70686,66659;  Surgeon: Mikie Stock MD;  Location: [facility]    Radiation left  2014    Spine surgery procedure unlisted      Upper gi endoscopy,biopsy N/A 12/11/2014    Procedure: ESOPHAGOGASTRODUODENOSCOPY, COLONOSCOPY, POSSIBLE BIOPSY, POSSIBLE POLYPECTOMY 22411,50547;  Surgeon: Mikie Stock MD;  Location: [facility]    Upper gi endoscopy,biopsy  1/8/18= Hiatal hernia. SB Bx normal    Upper gi endoscopy,exam

## 2025-07-23 NOTE — PATIENT INSTRUCTIONS
- WE WILL CALL TO SCHEDULE YOUR CT SIMULATION FOR RADIATION PLANNING.       - IF YOU HAVE ANY QUESTIONS OR CONCERNS REGARDING RADIATION THERAPY, PLEASE CALL [phone].

## 2025-07-23 NOTE — CONSULTS
[facility]  RADIATION ONCOLOGY  CONSULTATION     PATIENT:   Korina Stephencolby        REFERRING MD:  DUANE Duffy MD  DIAGNOSIS:   R breast cancer      HPI   80 yo here for consult.    Hx of 3 cm grade 3 IDC, ER+/Her2- LEFT breast cancer treated with lumpectomy, SNbx, TC x 4, L breast RT with boost, 12/2014. 1-2 months of AI. Switched to tamoxifen x 9-10 years, stopped 5/2024 for VB.    Now mammo 3/2025:  asymmetry R breast. Dx imaging 4/2025:  17 mm and 5 mm masses, 1 cm apart, 10:00 R breast, about 6 cm FN. US of axilla is negative.    2 site R breast biopsy 4/30/2025:  grade 2 invasive mammary cancer. Lobular features. , IL 90, Ki 15-25%, Her2 1+.    Lumpectomy, shave margins 5/30/2025:  3.7 cm and 1 mm foci of grade 2 invasive carcinoma extending to inferior margin, nuclear grade 2-3 DCIS. Inferior shave margin with 3 mm focus grade 3 DCIS, 0.5mm from true inferior margin. Other 5 shave margins negative for cancer. No LVI. No LCIS.    Oncotype DX RS is 9.  Genetic testing is negative for pathogenic mutation.    Doing well postop. Plans AI.  Has a tooth implant, crown planned. Needs knee surgery and cataract surgery this year.    PMH  -hiatal hernia, AAA, adrenal nodules, HTN, HL    PSH  -back surgery    MEDS   amLODIPine (NORVASC) 5 mg, Oral, Daily    Benazepril HCl (LOTENSIN) 20 mg, Oral, Daily    celecoxib (CELEBREX) 200 mg, Oral, Daily    Cholecalciferol (VITAMIN D) 1000 units Oral Tab Take by mouth. Daily except when she takes weekly 50,000 dose    DULoxetine (CYMBALTA) 60 mg, Oral, Daily    exemestane (AROMASIN) 25 mg, Oral, Daily    HYDROcodone-acetaminophen 5-325 MG Oral Tab 1-2 tablets, Oral, Every 6 hours PRN    metoprolol succinate ER (TOPROL XL) 25 mg, Daily    Omega-3 Fatty Acids (FISH OIL OR) Daily    rosuvastatin 10 MG Oral Tab Take 1 tablet (10 mg total) by mouth nightly.     SH  -lives in [place]    Family History[1]    ROS  -pertinent items in HPI.      PHYSICAL EXAM   ECO  GENERAL: Walker. Well appearing. NAD.  HEENT: No LAD.  CHEST: Regular rate and rhythm.    Clear to auscultation.     Large breast vol.  ABD:  Soft, non-tender.  EXT:  No edema.  NEURO: Alert and oriented.    IMPRESSION/PLAN   78 yo s/p lumpectomy and RT for left breast cancer in 2014, f/b endocrine therapy stopped .     Now with new R breast cancer, grade 2, 2 foci measuring 3.7 cm and 1 mm, with areas of grade 3 DCIS extending close to the final/true inferior margin. No SN bx pursued bc of age, ultrasound negative status.    Recommend adjuvant whole breast RT, hypofractionated, followed by boost.  Will try prone, but probably won't tolerate -- 2nd option is supine.    Boost is favored because of the close inferior margin.    AI to follow.    Discussed potential side effects:  fatigue, skin reaction, breast edema/fibrosis.    Keerthi Gupta MD  Radiation Oncology    CC: DUANE Gunn MD    50 minutes were spent with the patient, more than 50 percent on counseling/coordination of care (discuss disease status, goals of therapy, methods of radiation therapy, side effect discussion, role of RT in overall care)         [1]   Family History  Problem Relation Age of Onset    Heart Attack Father     Other (pneumonia) Mother     Other (unknown cause 32) Brother     Breast Cancer Sister 71    Other (COPD) Sister     Breast Cancer Paternal Aunt         80's    Breast Cancer Paternal Cousin Female         early 30's    Breast Cancer Self 68

## 2025-07-28 ENCOUNTER — HOSPITAL ENCOUNTER (OUTPATIENT)
Dept: RADIATION ONCOLOGY | Facility: HOSPITAL | Age: 80
Discharge: HOME | End: 2025-07-28
Attending: RADIOLOGY
Payer: MEDICARE

## 2025-08-01 ENCOUNTER — HOSPITAL ENCOUNTER (OUTPATIENT)
Dept: RADIATION ONCOLOGY | Facility: HOSPITAL | Age: 80
Discharge: HOME | End: 2025-08-01
Attending: RADIOLOGY

## 2025-08-07 PROCEDURE — 77300 RADIATION THERAPY DOSE PLAN: CPT | Performed by: RADIOLOGY

## 2025-08-07 PROCEDURE — 77295 3-D RADIOTHERAPY PLAN: CPT | Performed by: RADIOLOGY

## 2025-08-07 PROCEDURE — 77334 RADIATION TREATMENT AID(S): CPT | Performed by: RADIOLOGY

## 2025-08-12 ENCOUNTER — HOSPITAL ENCOUNTER (OUTPATIENT)
Dept: RADIATION ONCOLOGY | Facility: HOSPITAL | Age: 80
Discharge: HOME | End: 2025-08-12
Attending: RADIOLOGY

## 2025-08-12 PROCEDURE — 77280 THER RAD SIMULAJ FIELD SMPL: CPT | Performed by: RADIOLOGY

## 2025-08-12 PROCEDURE — 77412 RADIATION TX DELIVERY LVL 3: CPT | Performed by: RADIOLOGY

## 2025-08-13 PROCEDURE — 77387 GUIDANCE FOR RADJ TX DLVR: CPT | Performed by: RADIOLOGY

## 2025-08-13 PROCEDURE — 77412 RADIATION TX DELIVERY LVL 3: CPT | Performed by: RADIOLOGY

## 2025-08-14 ENCOUNTER — HOSPITAL ENCOUNTER (OUTPATIENT)
Dept: RADIATION ONCOLOGY | Facility: HOSPITAL | Age: 80
Discharge: HOME | End: 2025-08-14
Attending: RADIOLOGY

## 2025-08-14 VITALS
HEART RATE: 87 BPM | TEMPERATURE: 98 F | SYSTOLIC BLOOD PRESSURE: 124 MMHG | DIASTOLIC BLOOD PRESSURE: 84 MMHG | RESPIRATION RATE: 16 BRPM | OXYGEN SATURATION: 95 %

## 2025-08-14 DIAGNOSIS — Z17.0 CARCINOMA OF UPPER-OUTER QUADRANT OF RIGHT BREAST IN FEMALE, ESTROGEN RECEPTOR POSITIVE (HCC): Primary | ICD-10-CM

## 2025-08-14 DIAGNOSIS — C50.411 CARCINOMA OF UPPER-OUTER QUADRANT OF RIGHT BREAST IN FEMALE, ESTROGEN RECEPTOR POSITIVE (HCC): Primary | ICD-10-CM

## 2025-08-14 PROCEDURE — 77412 RADIATION TX DELIVERY LVL 3: CPT | Performed by: RADIOLOGY

## 2025-08-14 PROCEDURE — 77290 THER RAD SIMULAJ FIELD CPLX: CPT | Performed by: RADIOLOGY

## 2025-08-15 PROCEDURE — 77412 RADIATION TX DELIVERY LVL 3: CPT | Performed by: RADIOLOGY

## 2025-08-15 PROCEDURE — 77331 SPECIAL RADIATION DOSIMETRY: CPT | Performed by: RADIOLOGY

## 2025-08-15 PROCEDURE — 77387 GUIDANCE FOR RADJ TX DLVR: CPT | Performed by: RADIOLOGY

## 2025-08-18 PROCEDURE — 77412 RADIATION TX DELIVERY LVL 3: CPT | Performed by: RADIOLOGY

## 2025-08-18 PROCEDURE — 77387 GUIDANCE FOR RADJ TX DLVR: CPT | Performed by: RADIOLOGY

## 2025-08-18 PROCEDURE — 77417 THER RADIOLOGY PORT IMAGE(S): CPT | Performed by: RADIOLOGY

## 2025-08-19 PROCEDURE — 77387 GUIDANCE FOR RADJ TX DLVR: CPT | Performed by: RADIOLOGY

## 2025-08-19 PROCEDURE — 77412 RADIATION TX DELIVERY LVL 3: CPT | Performed by: RADIOLOGY

## 2025-08-20 PROCEDURE — 77387 GUIDANCE FOR RADJ TX DLVR: CPT | Performed by: RADIOLOGY

## 2025-08-20 PROCEDURE — 77290 THER RAD SIMULAJ FIELD CPLX: CPT | Performed by: RADIOLOGY

## 2025-08-20 PROCEDURE — 77307 TELETHX ISODOSE PLAN CPLX: CPT | Performed by: RADIOLOGY

## 2025-08-20 PROCEDURE — 77412 RADIATION TX DELIVERY LVL 3: CPT | Performed by: RADIOLOGY

## 2025-08-20 PROCEDURE — 77334 RADIATION TREATMENT AID(S): CPT | Performed by: RADIOLOGY

## 2025-08-21 ENCOUNTER — HOSPITAL ENCOUNTER (OUTPATIENT)
Dept: RADIATION ONCOLOGY | Facility: HOSPITAL | Age: 80
Discharge: HOME | End: 2025-08-21
Attending: RADIOLOGY

## 2025-08-21 VITALS
DIASTOLIC BLOOD PRESSURE: 97 MMHG | RESPIRATION RATE: 18 BRPM | HEART RATE: 93 BPM | TEMPERATURE: 98 F | OXYGEN SATURATION: 97 % | SYSTOLIC BLOOD PRESSURE: 139 MMHG

## 2025-08-21 DIAGNOSIS — Z17.0 CARCINOMA OF UPPER-OUTER QUADRANT OF RIGHT BREAST IN FEMALE, ESTROGEN RECEPTOR POSITIVE (HCC): Primary | ICD-10-CM

## 2025-08-21 DIAGNOSIS — C50.411 CARCINOMA OF UPPER-OUTER QUADRANT OF RIGHT BREAST IN FEMALE, ESTROGEN RECEPTOR POSITIVE (HCC): Primary | ICD-10-CM

## 2025-08-21 PROCEDURE — 77336 RADIATION PHYSICS CONSULT: CPT | Performed by: RADIOLOGY

## 2025-08-21 PROCEDURE — 77387 GUIDANCE FOR RADJ TX DLVR: CPT | Performed by: RADIOLOGY

## 2025-08-21 PROCEDURE — 77412 RADIATION TX DELIVERY LVL 3: CPT | Performed by: RADIOLOGY

## 2025-08-22 PROCEDURE — 77412 RADIATION TX DELIVERY LVL 3: CPT | Performed by: RADIOLOGY

## 2025-08-22 PROCEDURE — 77387 GUIDANCE FOR RADJ TX DLVR: CPT | Performed by: RADIOLOGY

## 2025-08-25 PROCEDURE — 77412 RADIATION TX DELIVERY LVL 3: CPT | Performed by: RADIOLOGY

## 2025-08-25 PROCEDURE — 77387 GUIDANCE FOR RADJ TX DLVR: CPT | Performed by: RADIOLOGY

## 2025-08-26 PROCEDURE — 77412 RADIATION TX DELIVERY LVL 3: CPT | Performed by: RADIOLOGY

## 2025-08-26 PROCEDURE — 77387 GUIDANCE FOR RADJ TX DLVR: CPT | Performed by: RADIOLOGY

## 2025-08-27 PROCEDURE — 77412 RADIATION TX DELIVERY LVL 3: CPT | Performed by: RADIOLOGY

## 2025-08-27 PROCEDURE — 77387 GUIDANCE FOR RADJ TX DLVR: CPT | Performed by: RADIOLOGY

## 2025-08-28 ENCOUNTER — HOSPITAL ENCOUNTER (OUTPATIENT)
Dept: RADIATION ONCOLOGY | Facility: HOSPITAL | Age: 80
Discharge: HOME | End: 2025-08-28
Attending: RADIOLOGY

## 2025-08-28 VITALS
TEMPERATURE: 98 F | OXYGEN SATURATION: 96 % | SYSTOLIC BLOOD PRESSURE: 129 MMHG | RESPIRATION RATE: 20 BRPM | HEART RATE: 98 BPM | DIASTOLIC BLOOD PRESSURE: 80 MMHG

## 2025-08-28 DIAGNOSIS — C50.411 CARCINOMA OF UPPER-OUTER QUADRANT OF RIGHT BREAST IN FEMALE, ESTROGEN RECEPTOR POSITIVE (HCC): Primary | ICD-10-CM

## 2025-08-28 DIAGNOSIS — Z17.0 CARCINOMA OF UPPER-OUTER QUADRANT OF RIGHT BREAST IN FEMALE, ESTROGEN RECEPTOR POSITIVE (HCC): Primary | ICD-10-CM

## 2025-08-28 PROCEDURE — 77412 RADIATION TX DELIVERY LVL 3: CPT | Performed by: RADIOLOGY

## 2025-08-28 PROCEDURE — 77387 GUIDANCE FOR RADJ TX DLVR: CPT | Performed by: RADIOLOGY

## 2025-08-29 PROCEDURE — 77336 RADIATION PHYSICS CONSULT: CPT | Performed by: RADIOLOGY

## 2025-08-29 PROCEDURE — 77412 RADIATION TX DELIVERY LVL 3: CPT | Performed by: RADIOLOGY

## 2025-08-29 PROCEDURE — 77387 GUIDANCE FOR RADJ TX DLVR: CPT | Performed by: RADIOLOGY

## (undated) DIAGNOSIS — C50.912 BREAST CARCINOMA, FEMALE, LEFT (HCC): ICD-10-CM

## (undated) DIAGNOSIS — F41.9 ANXIETY: ICD-10-CM

## (undated) DIAGNOSIS — E66.01 OBESITY, MORBID (HCC): ICD-10-CM

## (undated) DIAGNOSIS — I10 ESSENTIAL HYPERTENSION: ICD-10-CM

## (undated) DIAGNOSIS — G89.29 CHRONIC PAIN OF RIGHT KNEE: ICD-10-CM

## (undated) DIAGNOSIS — I10 HYPERTENSION, UNSPECIFIED TYPE: ICD-10-CM

## (undated) DIAGNOSIS — E78.2 HYPERLIPIDEMIA, MIXED: ICD-10-CM

## (undated) DIAGNOSIS — M25.561 CHRONIC PAIN OF RIGHT KNEE: ICD-10-CM

## (undated) DIAGNOSIS — F32.A DEPRESSION, UNSPECIFIED DEPRESSION TYPE: ICD-10-CM

## (undated) DIAGNOSIS — M25.562 CHRONIC PAIN OF LEFT KNEE: ICD-10-CM

## (undated) DIAGNOSIS — Z85.3 HISTORY OF BREAST CANCER: Primary | ICD-10-CM

## (undated) DIAGNOSIS — R92.8 ABNORMAL MAMMOGRAM: ICD-10-CM

## (undated) DIAGNOSIS — G89.29 CHRONIC PAIN OF LEFT KNEE: ICD-10-CM

## (undated) DEVICE — UNDYED BRAIDED (POLYGLACTIN 910), SYNTHETIC ABSORBABLE SUTURE: Brand: COATED VICRYL

## (undated) DEVICE — SKIN REG/FINE DUAL MARKER, RULER, LABELS: Brand: MEDLINE

## (undated) DEVICE — Device

## (undated) DEVICE — VIOLET BRAIDED (POLYGLACTIN 910), SYNTHETIC ABSORBABLE SUTURE: Brand: COATED VICRYL

## (undated) DEVICE — MEDI-VAC NON-CONDUCTIVE SUCTION TUBING: Brand: CARDINAL HEALTH

## (undated) DEVICE — 3M™ RED DOT™ MONITORING ELECTRODE WITH FOAM TAPE AND STICKY GEL, 50/BAG, 20/CASE, 72/PLT 2570: Brand: RED DOT™

## (undated) DEVICE — SOFT TISSUE SHAVER MINI: Brand: TRUCLEAR

## (undated) DEVICE — STERILE POLYISOPRENE POWDER-FREE SURGICAL GLOVES: Brand: PROTEXIS

## (undated) DEVICE — PAIN TRAY: Brand: MEDLINE INDUSTRIES, INC.

## (undated) DEVICE — LAP CHOLE/APPY CDS-LF: Brand: MEDLINE INDUSTRIES, INC.

## (undated) DEVICE — GLOVE,SURG,SENSICARE,ALOE,LF,PF,7: Brand: MEDLINE

## (undated) DEVICE — TROCAR: Brand: KII FIOS FIRST ENTRY

## (undated) DEVICE — 3M™ STERI-DRAPE™ INSTRUMENT POUCH 1018: Brand: STERI-DRAPE™

## (undated) DEVICE — INSULATED BLADE ELECTRODE: Brand: EDGE

## (undated) DEVICE — SOLUTION IRRIG 3000ML 0.9% NACL FLX CONT

## (undated) DEVICE — RF CANNULA, CVD: Brand: VENOM

## (undated) DEVICE — STERILE POLYISOPRENE POWDER-FREE SURGICAL GLOVES WITH EMOLLIENT COATING: Brand: PROTEXIS

## (undated) DEVICE — STERILE SYNTHETIC POLYISOPRENE POWDER-FREE SURGICAL GLOVES WITH HYDROGEL COATING: Brand: PROTEXIS

## (undated) DEVICE — SYRINGE 50ML LL TIP

## (undated) DEVICE — PATIENT RETURN ELECTRODE, SINGLE-USE, CONTACT QUALITY MONITORING, ADULT, WITH 9FT CORD, FOR PATIENTS WEIGING OVER 33LBS. (15KG): Brand: MEGADYNE

## (undated) DEVICE — COVER,LIGHT,CAMERA,HARD,1/PK,STRL: Brand: MEDLINE

## (undated) DEVICE — REMOVER LOT 4OZ N IRRIG UNSCNT SFT MOIST LIQ

## (undated) DEVICE — GLOVE SUR 6.5 SENSICARE PIP WHT PWD F

## (undated) DEVICE — 1200CC GUARDIAN II: Brand: GUARDIAN

## (undated) DEVICE — GAUZE SPONGES,USP TYPE VII GAUZE, 12 PLY: Brand: CURITY

## (undated) DEVICE — WECK HORIZON MED BLUE CLIP DISP

## (undated) DEVICE — GOWN,SIRUS,FABRNF,L,20/CS: Brand: MEDLINE

## (undated) DEVICE — TISSUE RETRIEVAL SYSTEM: Brand: INZII RETRIEVAL SYSTEM

## (undated) DEVICE — HYSTEROSCOPIC OUTFLOW TUBE SET

## (undated) DEVICE — BANDAGE ADH 1INX3IN NAT FAB N ADH PD CURAD

## (undated) DEVICE — SPECIMEN SOCK - STANDARD: Brand: MEDI-VAC

## (undated) DEVICE — GLOVE SUR 7.5 SENSICARE PIP WHT PWD F

## (undated) DEVICE — DRAPE PACK CHEST AND U BAR

## (undated) DEVICE — 3M™ TEGADERM™ TRANSPARENT FILM DRESSING, 1626W, 4 IN X 4-3/4 IN (10 CM X 12 CM), 50 EACH/CARTON, 4 CARTON/CASE: Brand: 3M™ TEGADERM™

## (undated) DEVICE — ELITE HYSTEROSCOPE SEAL: Brand: TRUCLEAR

## (undated) DEVICE — ENDOSCOPY PACK - LOWER: Brand: MEDLINE INDUSTRIES, INC.

## (undated) DEVICE — SLEEVE KENDALL SCD EXPRESS MED

## (undated) DEVICE — STANDARD HYPODERMIC NEEDLE,POLYPROPYLENE HUB: Brand: MONOJECT

## (undated) DEVICE — SLEEVE COMPR MD KNEE LEN SGL USE KENDALL SCD

## (undated) DEVICE — SYRINGE MED 10ML LL CTRL W/ FNGR GRP CLR BRL

## (undated) DEVICE — ENDOSCOPY PACK UPPER: Brand: MEDLINE INDUSTRIES, INC.

## (undated) DEVICE — BREAST-HERNIA-PORT CDS-LF: Brand: MEDLINE INDUSTRIES, INC.

## (undated) DEVICE — FORCEP BIOPSY RJ4 LG CAP W/ND

## (undated) DEVICE — Device: Brand: DEFENDO AIR/WATER/SUCTION AND BIOPSY VALVE

## (undated) DEVICE — NEEDLE SPNL 22GA L3.5IN BLK QNCKE STYL DISP

## (undated) DEVICE — SOLUTION IRRIG 1000ML 0.9% NACL USP BTL

## (undated) DEVICE — LIGHT HANDLE

## (undated) DEVICE — 40580 - THE PINK PAD - ADVANCED TRENDELENBURG POSITIONING KIT: Brand: 40580 - THE PINK PAD - ADVANCED TRENDELENBURG POSITIONING KIT

## (undated) DEVICE — PAD,ABDOMINAL,8"X7.5",ST,LF,20/BX: Brand: MEDLINE INDUSTRIES, INC.

## (undated) DEVICE — SET TB INFLO FOR TRUCLEAR SYS HYSTEROLUX

## (undated) DEVICE — GLOVE SUR 6.5 SENSICARE PI PIP CRM PWD F

## (undated) DEVICE — SUT PERMA- 2-0 18IN FS NABSRB BLK 26MM 3/8

## (undated) DEVICE — DISPOSABLE LAPAROSCOPIC CLIP APPLIER WITH 20 CLIPS.: Brand: EPIX® UNIVERSAL CLIP APPLIER

## (undated) DEVICE — HI-LO ORAL/NASAL TRACHEAL TUBE CUFFED,MURPHY EYE: Brand: SHILEY

## (undated) DEVICE — SYRINGE 20CC LL TIP

## (undated) DEVICE — NEEDLE SPNL 22GA L3.5IN BLK HUB SS RW FIT

## (undated) DEVICE — TROCARS: Brand: KII® BALLOON BLUNT TIP SYSTEM

## (undated) DEVICE — ANTIBACTERIAL UNDYED BRAIDED (POLYGLACTIN 910), SYNTHETIC ABSORBABLE SUTURE: Brand: COATED VICRYL

## (undated) DEVICE — PREMIUM WET SKIN PREP TRAY: Brand: MEDLINE INDUSTRIES, INC.

## (undated) DEVICE — PENCIL TELESCOPE MEGADYNE SE

## (undated) DEVICE — STERILE SYNTHETIC POLYISOPRENE POWDER-FREE SURGICAL GLOVES WITH HYDROGEL COATING, SMOOTH FINISH, STRAIGHT FINGER: Brand: PROTEXIS

## (undated) DEVICE — GYN CDS: Brand: MEDLINE INDUSTRIES, INC.

## (undated) DEVICE — WECK HORIZON SMALL YELLOW CLIP DISP

## (undated) DEVICE — SLEEVE COMPR M KNEE LEN SGL USE KENDALL SCD

## (undated) DEVICE — APPLICATOR CHLORAPREP 26ML

## (undated) DEVICE — DRAPE TAPE: Brand: CONVERTORS

## (undated) DEVICE — SUT MCRYL 4-0 18IN PS-2 ABSRB UD 19MM 3/8 CIR

## (undated) DEVICE — FILTERLINE NASAL ADULT O2/CO2

## (undated) DEVICE — CLEAR MONOFILAMENT (POLYDIOXANONE), ABSORBABLE SURGICAL SUTURE: Brand: PDS

## (undated) DEVICE — 3M(TM) TEGADERM(TM) TRANSPARENT FILM DRESSING FRAME STYLE 9505W: Brand: 3M™ TEGADERM™

## (undated) DEVICE — 2000CC GUARDIAN II: Brand: GUARDIAN

## (undated) NOTE — LETTER
[facility]  [address] [facility], [address] [place] [address]    Consent for Operation    Date: __________________    Time: _______________    1.  I authorize the performance upon [employer] the following operation:    Procedure(s):  COLONOSCOPY videotape. [facility] will not be responsible for storage or maintenance of this tape. 6. For the purpose of advancing medical education, I consent to the admittance of observers to the Operating Room.     7. I authorize the use of any specimen, organs Signature of Patient:   ___________________________    When the patient is a minor or mentally incompetent to give consent:  Signature of person authorized to consent for patient: ___________________________   Relationship to patient: _____________________ drugs/illegal medications). Failure to inform my anesthesiologist about these medicines may increase my risk of anesthetic complications. · If I am allergic to anything or have had a reaction to anesthesia before.     3. I understand how the anesthesia med I have discussed the procedure and information above with the patient (or patient’s representative) and answered their questions. The patient or their representative has agreed to have anesthesia services.     _______________________________________________

## (undated) NOTE — LETTER
[address]  [address]  Authorization for Surgical Operation and Procedure     Date:___________                                                                                                         Time:__________  I hereby authorize Surgeon(s):  Jessica Duffy MD, my physician and his/her assistants (if applicable), which may include medical students, residents, and/or fellows, to perform the following surgical operation/ procedure and administer such anesthesia as may be determined necessary by my physician:  Operation/Procedure name (s) Procedure(s):  Right breast wire bracketed localized lumpectomy on Korina Mosquera   2.   I recognize that during the surgical operation/procedure, unforeseen conditions may necessitate additional or different procedures than those listed above.  I, therefore, further authorize and request that the above-named surgeon, assistants, or designees perform such procedures as are, in their judgment, necessary and desirable.    3.   My surgeon/physician has discussed prior to my surgery the potential benefits, risks and side effects of this procedure; the likelihood of achieving goals; and potential problems that might occur during recuperation.  They also discussed reasonable alternatives to the procedure, including risks, benefits, and side effects related to the alternatives and risks related to not receiving this procedure.  I have had all my questions answered and I acknowledge that no guarantee has been made as to the result that may be obtained.    4.   Should the need arise during my operation/procedure, which includes change of level of care prior to discharge, I also consent to the administration of blood and/or blood products.  Further, I understand that despite careful testing and screening of blood or blood products by collecting agencies, I may still be subject to ill effects as a result of receiving a blood transfusion  and/or blood products.  The following are some, but not all, of the potential risks that can occur: fever and allergic reactions, hemolytic reactions, transmission of diseases such as Hepatitis, AIDS and Cytomegalovirus (CMV) and fluid overload.  In the event that I wish to have an autologous transfusion of my own blood, or a directed donor transfusion, I will discuss this with my physician.  Check only if Refusing Blood or Blood Products  I understand refusal of blood or blood products as deemed necessary by my physician may have serious consequences to my condition to include possible death. I hereby assume responsibility for my refusal and release the hospital, its personnel, and my physicians from any responsibility for the consequences of my refusal.          o  Refuse      5.   I authorize the use of any specimen, organs, tissues, body parts or foreign objects that may be removed from my body during the operation/procedure for diagnosis, research or teaching purposes and their subsequent disposal by hospital authorities.  I also authorize the release of specimen test results and/or written reports to my treating physician on the hospital medical staff or other referring or consulting physicians involved in my care, at the discretion of the Pathologist or my treating physician.    6.   I consent to the photographing or videotaping of the operations or procedures to be performed, including appropriate portions of my body for medical, scientific, or educational purposes, provided my identity is not revealed by the pictures or by descriptive texts accompanying them.  If the procedure has been photographed/videotaped, the surgeon will obtain the original picture, image, videotape or CD.  The hospital will not be responsible for storage, release or maintenance of the picture, image, tape or CD.    7.   I consent to the presence of a  or observers in the operating room as deemed necessary by my  physician or their designees.    8.   I recognize that in the event my procedure results in extended X-Ray/fluoroscopy time, I may develop a skin reaction.    9. If I have a Do Not Attempt Resuscitation (DNAR) order in place, that status will be suspended while in the operating room, procedural suite, and during the recovery period unless otherwise explicitly stated by me (or a person authorized to consent on my behalf). The surgeon or my attending physician will determine when the applicable recovery period ends for purposes of reinstating the DNAR order.  10. Patients having a sterilization procedure: I understand that if the procedure is successful the results will be permanent and it will therefore be impossible for me to inseminate, conceive, or bear children.  I also understand that the procedure is intended to result in sterility, although the result has not been guaranteed.   11. I acknowledge that my physician has explained sedation/analgesia administration to me including the risk and benefits I consent to the administration of sedation/analgesia as may be necessary or desirable in the judgment of my physician.    I CERTIFY THAT I HAVE READ AND FULLY UNDERSTAND THE ABOVE CONSENT TO OPERATION and/or OTHER PROCEDURE.    _________________________________________  __________________________________  Signature of Patient     Signature of Responsible Person         ___________________________________         Printed Name of Responsible Person           _________________________________                 Relationship to Patient  _________________________________________  ______________________________  Signature of Witness          Date  Time      Patient Name: Korina Ward Mosquera     : 1945                 Printed: May 30, 2025     Medical Record #: [id number]                     Page 1 of 2                                    [address]  [address]    Consent for  Anesthesia    I, Korina Mosquera agree to be cared for by an anesthesiologist, who is specially trained to monitor me and give me medicine to put me to sleep or keep me comfortable during my procedure    I understand that my anesthesiologist is not an employee or agent of [facility] or [employer]. He or she works for [employer].    As the patient asking for anesthesia services, I agree to:  Allow the anesthesiologist (anesthesia doctor) to give me medicine and do additional procedures as necessary. Some examples are: Starting or using an “IV” to give me medicine, fluids or blood during my procedure, and having a breathing tube placed to help me breathe when I’m asleep (intubation). In the event that my heart stops working properly, I understand that my anesthesiologist will make every effort to sustain my life, unless otherwise directed by [facility] Do Not Resuscitate documents.  Tell my anesthesia doctor before my procedure:  If I am pregnant.  The last time that I ate or drank.  All of the medicines I take (including prescriptions, herbal supplements, and pills I can buy without a prescription (including street drugs/illegal medications). Failure to inform my anesthesiologist about these medicines may increase my risk of anesthetic complications.  If I am allergic to anything or have had a reaction to anesthesia before.  I understand how the anesthesia medicine will help me (benefits).  I understand that with any type of anesthesia medicine there are risks:  The most common risks are: nausea, vomiting, sore throat, muscle soreness, damage to my eyes, mouth, or teeth (from breathing tube placement).  Rare risks include: remembering what happened during my procedure, allergic reactions to medications, injury to my airway, heart, lungs, vision, nerves, or muscles and in extremely rare instances death.  My doctor has explained to me other choices available to me for my  care (alternatives).  Pregnant Patients (“epidural”):  I understand that the risks of having an epidural (medicine given into my back to help control pain during labor), include itching, low blood pressure, difficulty urinating, headache or slowing of the baby’s heart. Very rare risks include infection, bleeding, seizure, irregular heart rhythms and nerve injury.  Regional Anesthesia (“spinal”, “epidural”, & “nerve blocks”):  I understand that rare but potential complications include headache, bleeding, infection, seizure, irregular heart rhythms, and nerve injury.    I can change my mind about having anesthesia services at any time before I get the medicine.    _____________________________________________________________________________  Patient (or Representative) Signature/Relationship to Patient  Date   Time    _____________________________________________________________________________   Name (if used)    Language/Organization   Time    _____________________________________________________________________________  Anesthesiologist Signature     Date   Time  I have discussed the procedure and information above with the patient (or patient’s representative) and answered their questions. The patient or their representative has agreed to have anesthesia services.    _____________________________________________________________________________  Witness        Date   Time  I have verified that the signature is that of the patient or patient’s representative, and that it was signed before the procedure  Patient Name: Korina Mosquera     : 1945                 Printed: May 30, 2025     Medical Record #: [id number]                     Page 2 of 2

## (undated) NOTE — MR AVS SNAPSHOT
[address] at [address] [place] [address].  [address], [address] [address]  [phone]               Thank you for choosing us for your health care visit with Burgess Bita MD.  We are glad to serve you and happy to provid * Notice: This list has 2 medication(s) that are the same as other medications prescribed for you. Read the directions carefully, and ask your doctor or other care provider to review them with you.          Where to Get Your Medications      These medicat

## (undated) NOTE — LETTER
8/3/2018    Negar Abrams  [address] [facility]    Dear Ms. Mosquera,     Please contact our office to schedule a medicare annual visit with your doctor after November 10th 2018 to address important healthcare needs.   It is impo

## (undated) NOTE — LETTER
Patient Name: Korina Mosquera        : 1945       Medical Record #: [id number]    CONSENT FOR PROCEDURES/SEDATION    Date: 2024       Time: 3:04 PM        1. I authorize the performance upon Korina Mosquera the following:    _________BILATERAL KNEE STEROID INJECTION                              _______________    2. I authorize Dr. Fu (and whomever is designated as the doctor’s assistant), to perform the above mentioned procedures.    3. If any unforeseen conditions arise during this procedure calling for additional procedures, operations, or medications (including anesthesia and blood transfusion), I  further request and authorize the doctor to do whatever he/she deems advisable in my interest.    4. I consent to the taking and reproduction of any photographs in the course of this procedure for professional purposes.    5. I consent to the administration of such sedation as may be considered necessary or advisable by the physician responsible for this service, with the exception of  _____________________________.    6. I have been informed by my doctor of the nature and purpose of this procedure/sedation, possible alternative methods of treatment, risk involved and possible complications.      Signature of Patient:  ___________________________    Signature of person authorized to consent for patient: Relationship to patient:  ___________________________    ___________________    Witness: ____________________     Date: ______________    Provider: ____________________     Date: ______________

## (undated) NOTE — LETTER
[facility]  [address] [facility], [address] [place] [address]    Consent for Operation    Date: __________________    Time: _______________    1.  I authorize the performance upon Dominik Bennett the following operation:    Procedure(s):  Esophagogast procedure has been videotaped, the surgeon will obtain the original videotape. The hospital will not be responsible for storage or maintenance of this tape.     6. For the purpose of advancing medical education, I consent to the admittance of observers to t STATEMENTS REQUIRING INSERTION OR COMPLETION WERE FILLED IN.     Signature of Patient:   ___________________________    When the patient is a minor or mentally incompetent to give consent:  Signature of person authorized to consent for patient: ____________ supplements, and pills I can buy without a prescription (including street drugs/illegal medications). Failure to inform my anesthesiologist about these medicines may increase my risk of anesthetic complications.   · If I am allergic to anything or have had Anesthesiologist Signature     Date   Time  I have discussed the procedure and information above with the patient (or patient’s representative) and answered their questions. The patient or their representative has agreed to have anesthesia services.     ___

## (undated) NOTE — LETTER
07/22/25      Korina Mosquera  [address] [place] [id number]      Dear Korina:    You had previously enrolled in our Chronic Care Management program and had been following up with your Health  monthly.     Currently, we are graduating patients who have been maintaining health. Graduating from the program simply means you have reached a point where additional care coordination is no longer needed on a monthly basis. You will continue to receive excellent care from your primary care team.     If you have any questions/concerns or if you find that you need additional support, please reach out to your Primary Care Doctor.     It was a pleasure working with you,    [facility]  Care Management Department

## (undated) NOTE — MR AVS SNAPSHOT
After Visit Summary   4/18/2017    Daniela Benson    MRN: [id number]           Diagnoses this Visit     History of breast cancer    -  Primary     Malignant neoplasm of upper-outer quadrant of left female breast ([address]           Allergies Call [phone] for help. [employer] is NOT to be used for urgent needs. For medical emergencies, dial 911.

## (undated) NOTE — Clinical Note
I had seen Ward today for her preop evaluation.  She is good to go for her surgery as scheduled from my standpoint.  She will be also cleared by her cardiologist Dr. Schulte. Thank you so much for consultation.

## (undated) NOTE — LETTER
[facility] [address], [address]  Consent for Procedure/Sedation    Date:        Time:       1.  I authorize the performance upon Vena Cast the following:cardiac catheterization, left ventricular cineangiography, rossy period, the physician will determine when the applicable recovery period ends for purposes of reinstating the Do Not Resuscitate (DNR) order.     Signature of Patient: ____________________________________________________    Signature of person authorized

## (undated) NOTE — ED AVS SNAPSHOT
[facility] in [address] [place] [address] Drive,[address] Floor    [address] [place] [address]    Phone:  [phone]    Fax:  [phone]           Gustavo Vaughana   MRN: [id number]    Department:  THE [facility] OF [facility] in [facility] END   Date of Visit:  2/14/ Perles every 6-8 hours as needed for cough. Push fluids. Humidified Air. Saline nasal spray. You may use a Neti pot for sinus rinses with saline. You may alternate Tylenol with Ibuprofen every 3 hours for fever or pain.   Go to the closest Emergency Depar this physician (or your personal doctor if your instructions are to return to your personal doctor) about any new or lasting problems. The primary care or specialist physician will see patients referred from the [facility].  Follow-up care is at - If you are a smoker or have smoked in the last 12 months, we encourage you to explore options for quitting.     - If you have concerns related to behavioral health issues or thoughts of harming yourself, contact [facility] a